# Patient Record
Sex: FEMALE | Race: WHITE | URBAN - METROPOLITAN AREA
[De-identification: names, ages, dates, MRNs, and addresses within clinical notes are randomized per-mention and may not be internally consistent; named-entity substitution may affect disease eponyms.]

---

## 2018-11-09 ENCOUNTER — TRANSCRIBE ORDERS (OUTPATIENT)
Dept: ADMINISTRATIVE | Facility: HOSPITAL | Age: 43
End: 2018-11-09

## 2023-07-04 ENCOUNTER — APPOINTMENT (EMERGENCY)
Dept: RADIOLOGY | Facility: HOSPITAL | Age: 48
DRG: 247 | End: 2023-07-04
Payer: COMMERCIAL

## 2023-07-04 ENCOUNTER — HOSPITAL ENCOUNTER (INPATIENT)
Facility: HOSPITAL | Age: 48
LOS: 2 days | Discharge: HOME | DRG: 247 | End: 2023-07-06
Attending: EMERGENCY MEDICINE | Admitting: INTERNAL MEDICINE
Payer: COMMERCIAL

## 2023-07-04 DIAGNOSIS — I21.4 NSTEMI (NON-ST ELEVATED MYOCARDIAL INFARCTION) (HCC): Primary | ICD-10-CM

## 2023-07-04 DIAGNOSIS — M54.2 NECK PAIN: ICD-10-CM

## 2023-07-04 DIAGNOSIS — R51.9 HEADACHE: ICD-10-CM

## 2023-07-04 PROBLEM — F41.9 ANXIETY: Status: ACTIVE | Noted: 2023-07-04

## 2023-07-04 PROBLEM — E66.01 MORBID OBESITY (HCC): Status: ACTIVE | Noted: 2023-07-04

## 2023-07-04 PROBLEM — M79.7 FIBROMYALGIA: Status: ACTIVE | Noted: 2023-07-04
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
## 2023-07-04 PROBLEM — E10.9 INSULIN DEPENDENT TYPE 1 DIABETES MELLITUS (HCC): Status: ACTIVE | Noted: 2023-07-04

## 2023-07-04 PROBLEM — I10 ESSENTIAL HYPERTENSION: Status: ACTIVE | Noted: 2023-07-04

## 2023-07-04 PROBLEM — E78.5 HYPERLIPIDEMIA: Status: ACTIVE | Noted: 2023-07-04

## 2023-07-04 LAB
2HR DELTA HS TROPONIN: -550 NG/L
4HR DELTA HS TROPONIN: -1288 NG/L
ALBUMIN SERPL BCP-MCNC: 3.4 G/DL (ref 3.5–5)
ALP SERPL-CCNC: 94 U/L (ref 46–116)
ALT SERPL W P-5'-P-CCNC: 31 U/L (ref 12–78)
ANION GAP SERPL CALCULATED.3IONS-SCNC: 2 MMOL/L
APTT PPP: 24 SECONDS (ref 23–37)
APTT PPP: 32 SECONDS (ref 23–37)
AST SERPL W P-5'-P-CCNC: 34 U/L (ref 5–45)
BASOPHILS # BLD AUTO: 0.05 THOUSANDS/ÂΜL (ref 0–0.1)
BASOPHILS NFR BLD AUTO: 1 % (ref 0–1)
BILIRUB SERPL-MCNC: 0.44 MG/DL (ref 0.2–1)
BUN SERPL-MCNC: 9 MG/DL (ref 5–25)
CALCIUM ALBUM COR SERPL-MCNC: 9.4 MG/DL (ref 8.3–10.1)
CALCIUM SERPL-MCNC: 8.9 MG/DL (ref 8.3–10.1)
CARDIAC TROPONIN I PNL SERPL HS: 1955 NG/L (ref 8–18)
CARDIAC TROPONIN I PNL SERPL HS: 2812 NG/L
CARDIAC TROPONIN I PNL SERPL HS: 3550 NG/L
CARDIAC TROPONIN I PNL SERPL HS: 4100 NG/L
CHLORIDE SERPL-SCNC: 104 MMOL/L (ref 96–108)
CO2 SERPL-SCNC: 28 MMOL/L (ref 21–32)
CREAT SERPL-MCNC: 0.97 MG/DL (ref 0.6–1.3)
EOSINOPHIL # BLD AUTO: 0.22 THOUSAND/ÂΜL (ref 0–0.61)
EOSINOPHIL NFR BLD AUTO: 2 % (ref 0–6)
ERYTHROCYTE [DISTWIDTH] IN BLOOD BY AUTOMATED COUNT: 12.1 % (ref 11.6–15.1)
FLUAV RNA RESP QL NAA+PROBE: NEGATIVE
FLUBV RNA RESP QL NAA+PROBE: NEGATIVE
GFR SERPL CREATININE-BSD FRML MDRD: 70 ML/MIN/1.73SQ M
GLUCOSE SERPL-MCNC: 241 MG/DL (ref 65–140)
HCG SERPL QL: NEGATIVE
HCT VFR BLD AUTO: 43 % (ref 34.8–46.1)
HGB BLD-MCNC: 14.4 G/DL (ref 11.5–15.4)
IMM GRANULOCYTES # BLD AUTO: 0.04 THOUSAND/UL (ref 0–0.2)
IMM GRANULOCYTES NFR BLD AUTO: 0 % (ref 0–2)
INR PPP: 1.11 (ref 0.84–1.19)
LYMPHOCYTES # BLD AUTO: 2.66 THOUSANDS/ÂΜL (ref 0.6–4.47)
LYMPHOCYTES NFR BLD AUTO: 27 % (ref 14–44)
MCH RBC QN AUTO: 29.3 PG (ref 26.8–34.3)
MCHC RBC AUTO-ENTMCNC: 33.5 G/DL (ref 31.4–37.4)
MCV RBC AUTO: 88 FL (ref 82–98)
MONOCYTES # BLD AUTO: 1.06 THOUSAND/ÂΜL (ref 0.17–1.22)
MONOCYTES NFR BLD AUTO: 11 % (ref 4–12)
NEUTROPHILS # BLD AUTO: 5.8 THOUSANDS/ÂΜL (ref 1.85–7.62)
NEUTS SEG NFR BLD AUTO: 59 % (ref 43–75)
NRBC BLD AUTO-RTO: 0 /100 WBCS
PLATELET # BLD AUTO: 233 THOUSANDS/UL (ref 149–390)
PMV BLD AUTO: 10.3 FL (ref 8.9–12.7)
POTASSIUM SERPL-SCNC: 4.2 MMOL/L (ref 3.5–5.3)
PROT SERPL-MCNC: 6.7 G/DL (ref 6.4–8.4)
PROTHROMBIN TIME: 14.5 SECONDS (ref 11.6–14.5)
RBC # BLD AUTO: 4.91 MILLION/UL (ref 3.81–5.12)
RSV RNA RESP QL NAA+PROBE: NEGATIVE
SARS-COV-2 RNA RESP QL NAA+PROBE: NEGATIVE
SODIUM SERPL-SCNC: 134 MMOL/L (ref 135–147)
TSH SERPL DL<=0.05 MIU/L-ACNC: 0.99 UIU/ML (ref 0.45–4.5)
WBC # BLD AUTO: 9.83 THOUSAND/UL (ref 4.31–10.16)

## 2023-07-04 PROCEDURE — 84703 CHORIONIC GONADOTROPIN ASSAY: CPT

## 2023-07-04 PROCEDURE — 99223 1ST HOSP IP/OBS HIGH 75: CPT | Performed by: INTERNAL MEDICINE

## 2023-07-04 PROCEDURE — 93005 ELECTROCARDIOGRAM TRACING: CPT

## 2023-07-04 PROCEDURE — 86618 LYME DISEASE ANTIBODY: CPT

## 2023-07-04 PROCEDURE — 85610 PROTHROMBIN TIME: CPT

## 2023-07-04 PROCEDURE — 36415 COLL VENOUS BLD VENIPUNCTURE: CPT

## 2023-07-04 PROCEDURE — 84484 ASSAY OF TROPONIN QUANT: CPT | Performed by: INTERNAL MEDICINE

## 2023-07-04 PROCEDURE — 99284 EMERGENCY DEPT VISIT MOD MDM: CPT

## 2023-07-04 PROCEDURE — 84484 ASSAY OF TROPONIN QUANT: CPT

## 2023-07-04 PROCEDURE — 71275 CT ANGIOGRAPHY CHEST: CPT

## 2023-07-04 PROCEDURE — 96375 TX/PRO/DX INJ NEW DRUG ADDON: CPT

## 2023-07-04 PROCEDURE — 99285 EMERGENCY DEPT VISIT HI MDM: CPT | Performed by: EMERGENCY MEDICINE

## 2023-07-04 PROCEDURE — 74174 CTA ABD&PLVS W/CONTRAST: CPT

## 2023-07-04 PROCEDURE — 85730 THROMBOPLASTIN TIME PARTIAL: CPT

## 2023-07-04 PROCEDURE — 85730 THROMBOPLASTIN TIME PARTIAL: CPT | Performed by: INTERNAL MEDICINE

## 2023-07-04 PROCEDURE — 96365 THER/PROPH/DIAG IV INF INIT: CPT

## 2023-07-04 PROCEDURE — 96366 THER/PROPH/DIAG IV INF ADDON: CPT

## 2023-07-04 PROCEDURE — 80053 COMPREHEN METABOLIC PANEL: CPT

## 2023-07-04 PROCEDURE — 70498 CT ANGIOGRAPHY NECK: CPT

## 2023-07-04 PROCEDURE — 0241U HB NFCT DS VIR RESP RNA 4 TRGT: CPT

## 2023-07-04 PROCEDURE — 84443 ASSAY THYROID STIM HORMONE: CPT

## 2023-07-04 PROCEDURE — 87476 LYME DIS DNA AMP PROBE: CPT | Performed by: INTERNAL MEDICINE

## 2023-07-04 PROCEDURE — 85025 COMPLETE CBC W/AUTO DIFF WBC: CPT

## 2023-07-04 PROCEDURE — 70496 CT ANGIOGRAPHY HEAD: CPT

## 2023-07-04 RX ORDER — ONDANSETRON 2 MG/ML
4 INJECTION INTRAMUSCULAR; INTRAVENOUS EVERY 4 HOURS PRN
Status: DISCONTINUED | OUTPATIENT
Start: 2023-07-04 | End: 2023-07-06 | Stop reason: HOSPADM

## 2023-07-04 RX ORDER — PANTOPRAZOLE SODIUM 40 MG/1
40 TABLET, DELAYED RELEASE ORAL
Status: DISCONTINUED | OUTPATIENT
Start: 2023-07-05 | End: 2023-07-06 | Stop reason: HOSPADM

## 2023-07-04 RX ORDER — HEPARIN SODIUM 10000 [USP'U]/100ML
3-20 INJECTION, SOLUTION INTRAVENOUS
Status: DISCONTINUED | OUTPATIENT
Start: 2023-07-04 | End: 2023-07-05

## 2023-07-04 RX ORDER — HYDROCODONE BITARTRATE AND ACETAMINOPHEN 5; 325 MG/1; MG/1
1 TABLET ORAL EVERY 6 HOURS PRN
COMMUNITY

## 2023-07-04 RX ORDER — GUAIFENESIN/DEXTROMETHORPHAN 100-10MG/5
10 SYRUP ORAL EVERY 4 HOURS PRN
Status: DISCONTINUED | OUTPATIENT
Start: 2023-07-04 | End: 2023-07-06 | Stop reason: HOSPADM

## 2023-07-04 RX ORDER — OMEPRAZOLE 20 MG/1
20 CAPSULE, DELAYED RELEASE ORAL DAILY
COMMUNITY

## 2023-07-04 RX ORDER — HEPARIN SODIUM 1000 [USP'U]/ML
2000 INJECTION, SOLUTION INTRAVENOUS; SUBCUTANEOUS EVERY 6 HOURS PRN
Status: DISCONTINUED | OUTPATIENT
Start: 2023-07-04 | End: 2023-07-05

## 2023-07-04 RX ORDER — DULOXETIN HYDROCHLORIDE 30 MG/1
30 CAPSULE, DELAYED RELEASE ORAL DAILY
COMMUNITY

## 2023-07-04 RX ORDER — ALPRAZOLAM 1 MG/1
1 TABLET ORAL
COMMUNITY

## 2023-07-04 RX ORDER — MELATONIN
2000 DAILY
COMMUNITY

## 2023-07-04 RX ORDER — ALPRAZOLAM 0.5 MG/1
1 TABLET ORAL DAILY PRN
Status: DISCONTINUED | OUTPATIENT
Start: 2023-07-04 | End: 2023-07-06 | Stop reason: HOSPADM

## 2023-07-04 RX ORDER — DULOXETIN HYDROCHLORIDE 30 MG/1
30 CAPSULE, DELAYED RELEASE ORAL DAILY
Status: DISCONTINUED | OUTPATIENT
Start: 2023-07-05 | End: 2023-07-06 | Stop reason: HOSPADM

## 2023-07-04 RX ORDER — METOCLOPRAMIDE HYDROCHLORIDE 5 MG/ML
10 INJECTION INTRAMUSCULAR; INTRAVENOUS ONCE
Status: COMPLETED | OUTPATIENT
Start: 2023-07-04 | End: 2023-07-04

## 2023-07-04 RX ORDER — HYDROCHLOROTHIAZIDE 12.5 MG/1
12.5 TABLET ORAL DAILY
Status: DISCONTINUED | OUTPATIENT
Start: 2023-07-05 | End: 2023-07-06 | Stop reason: HOSPADM

## 2023-07-04 RX ORDER — LISINOPRIL 10 MG/1
10 TABLET ORAL DAILY
Status: DISCONTINUED | OUTPATIENT
Start: 2023-07-05 | End: 2023-07-06 | Stop reason: HOSPADM

## 2023-07-04 RX ORDER — HYDROXYZINE HYDROCHLORIDE 25 MG/1
25 TABLET, FILM COATED ORAL EVERY 6 HOURS PRN
COMMUNITY

## 2023-07-04 RX ORDER — ATORVASTATIN CALCIUM 40 MG/1
40 TABLET, FILM COATED ORAL
Status: DISCONTINUED | OUTPATIENT
Start: 2023-07-04 | End: 2023-07-06 | Stop reason: HOSPADM

## 2023-07-04 RX ORDER — ACETAMINOPHEN 325 MG/1
975 TABLET ORAL ONCE
Status: COMPLETED | OUTPATIENT
Start: 2023-07-04 | End: 2023-07-04

## 2023-07-04 RX ORDER — SODIUM CHLORIDE, SODIUM GLUCONATE, SODIUM ACETATE, POTASSIUM CHLORIDE, MAGNESIUM CHLORIDE, SODIUM PHOSPHATE, DIBASIC, AND POTASSIUM PHOSPHATE .53; .5; .37; .037; .03; .012; .00082 G/100ML; G/100ML; G/100ML; G/100ML; G/100ML; G/100ML; G/100ML
1000 INJECTION, SOLUTION INTRAVENOUS ONCE
Status: COMPLETED | OUTPATIENT
Start: 2023-07-04 | End: 2023-07-04

## 2023-07-04 RX ORDER — ATORVASTATIN CALCIUM 40 MG/1
40 TABLET, FILM COATED ORAL DAILY
COMMUNITY
End: 2023-07-12 | Stop reason: SDUPTHER

## 2023-07-04 RX ORDER — IODIXANOL 320 MG/ML
120 INJECTION, SOLUTION INTRAVASCULAR
Status: COMPLETED | OUTPATIENT
Start: 2023-07-04 | End: 2023-07-04

## 2023-07-04 RX ORDER — HEPARIN SODIUM 1000 [USP'U]/ML
4000 INJECTION, SOLUTION INTRAVENOUS; SUBCUTANEOUS EVERY 6 HOURS PRN
Status: DISCONTINUED | OUTPATIENT
Start: 2023-07-04 | End: 2023-07-05

## 2023-07-04 RX ORDER — DIPHENHYDRAMINE HYDROCHLORIDE 50 MG/ML
25 INJECTION INTRAMUSCULAR; INTRAVENOUS ONCE
Status: COMPLETED | OUTPATIENT
Start: 2023-07-04 | End: 2023-07-04

## 2023-07-04 RX ORDER — HEPARIN SODIUM 1000 [USP'U]/ML
4000 INJECTION, SOLUTION INTRAVENOUS; SUBCUTANEOUS ONCE
Status: COMPLETED | OUTPATIENT
Start: 2023-07-04 | End: 2023-07-04

## 2023-07-04 RX ORDER — NITROGLYCERIN 0.4 MG/1
0.4 TABLET SUBLINGUAL
Status: DISCONTINUED | OUTPATIENT
Start: 2023-07-04 | End: 2023-07-06 | Stop reason: HOSPADM

## 2023-07-04 RX ADMIN — ACETAMINOPHEN 975 MG: 325 TABLET, FILM COATED ORAL at 11:11

## 2023-07-04 RX ADMIN — SODIUM CHLORIDE, SODIUM GLUCONATE, SODIUM ACETATE, POTASSIUM CHLORIDE, MAGNESIUM CHLORIDE, SODIUM PHOSPHATE, DIBASIC, AND POTASSIUM PHOSPHATE 1000 ML: .53; .5; .37; .037; .03; .012; .00082 INJECTION, SOLUTION INTRAVENOUS at 11:13

## 2023-07-04 RX ADMIN — IODIXANOL 120 ML: 320 INJECTION, SOLUTION INTRAVASCULAR at 12:21

## 2023-07-04 RX ADMIN — NITROGLYCERIN 0.4 MG: 0.4 TABLET SUBLINGUAL at 19:31

## 2023-07-04 RX ADMIN — DIPHENHYDRAMINE HYDROCHLORIDE 25 MG: 50 INJECTION, SOLUTION INTRAMUSCULAR; INTRAVENOUS at 11:13

## 2023-07-04 RX ADMIN — HEPARIN SODIUM 4000 UNITS: 1000 INJECTION INTRAVENOUS; SUBCUTANEOUS at 22:54

## 2023-07-04 RX ADMIN — HEPARIN SODIUM 11.1 UNITS/KG/HR: 10000 INJECTION, SOLUTION INTRAVENOUS at 13:41

## 2023-07-04 RX ADMIN — ASPIRIN 324 MG: 81 TABLET, COATED ORAL at 16:35

## 2023-07-04 RX ADMIN — ATORVASTATIN CALCIUM 40 MG: 40 TABLET, FILM COATED ORAL at 16:36

## 2023-07-04 RX ADMIN — NITROGLYCERIN 0.4 MG: 0.4 TABLET SUBLINGUAL at 18:12

## 2023-07-04 RX ADMIN — METOCLOPRAMIDE HYDROCHLORIDE 10 MG: 5 INJECTION INTRAMUSCULAR; INTRAVENOUS at 11:13

## 2023-07-04 RX ADMIN — HEPARIN SODIUM 4000 UNITS: 1000 INJECTION INTRAVENOUS; SUBCUTANEOUS at 13:39

## 2023-07-04 RX ADMIN — ALPRAZOLAM 1 MG: 0.5 TABLET ORAL at 18:04

## 2023-07-04 NOTE — ED NOTES
Pt reports improving of sx after nitro, throat is hurting less and arm pain is subsiding.       Shashi Wagoner RN  07/04/23 0498

## 2023-07-04 NOTE — ED NOTES
Pt unable to verify home medication doses with RN and RN unable to verify with home pharmacy d/t holiday hours.  Med rec completed based on screen shot provided by admitting physician via [employer] at his request.      Chandler Bryson RN  07/04/23 7946

## 2023-07-04 NOTE — H&P
[address] [facility]  H&P  Name: Tanvi Lo 57 y.o. female I MRN: [id number]  Unit/Bed#: ED 23 I Date of Admission: 7/4/2023   Date of Service: 7/4/2023 I Hospital Day: 0      Assessment & Plan:    * NSTEMI (non-ST elevated myocardial infarction)  Assessment & Plan  Presents with a constellation of progressive symptoms including chest discomfort with radiation to the right shoulder arm and jaw along with further radiation intermittently into the back of the neck/head - was sent in to the ED to rule out possible "meningitis" -> over the last few days, has also noted occasional episodes of diaphoresis, sore throat, and dizziness with above episodes -> peak of headache intensity was approximately 3-4 days ago with transient improvement  Denies fever/chills or persistent nuchal rigidity - WBC count normal with an unremarkable cell differential -> less likely suspicion of meningitis clinically at this time  Consider atypical presentation of an acute coronary event in a female ? ??   Further work-up in the ED revealed evidence of a significantly elevated high sensitivity troponin of 4100 with mild subsequent downtrending  Initiated on a heparin drip   CT angiogram of head/neck negative for acute intracranial etiology or intravascular issues - CT angiogram of chest/abdomen/pelvis negative for aortic dissection  Await echocardiogram to assess for structural heart disease and/or evaluation for stress cardiomyopathy -> await cardiology evaluation/input -> kept NPO past midnight for possibility of cardiac catheterization  Initiate ASA regimen - continue high intensity statin -> check updated A1c and fasting lipid panel   PRN SL NTG on board  Telemetry monitoring  Remains hemodynamically stable    Insulin dependent type 1 diabetes mellitus  Assessment & Plan  Lab Results   Component Value Date    HGBA1C 8.4 (H) 10/04/2021     Has been maintained on an insulin pump for nearly 30 years per patient recollection -> currently refusing to transition to a basal/prandial insulin with sliding scale coverage  Counseled patient that certain procedures in the hospital may require transitioning off a pump for a period of time -> patient expresses understanding  Carbohydrate restricted diet  Hypoglycemia protocol    Essential hypertension  Assessment & Plan  Low-sodium diet  Continue HCTZ/Zestril    Fibromyalgia  Assessment & Plan  Continue Cymbalta    Tick bites/exposure  Assessment & Plan  Patient reported to the ED staff of spending a lot of time outdoors last several days and removing several ticks off her body  Lyme disease work-up underway    Morbid obesity  Assessment & Plan  Lifestyle/diet modifications    Hyperlipidemia  Assessment & Plan  Continue Lipitor  Await updated lipid panel in the setting of primary issue    Anxiety  Assessment & Plan  C/w home PRN Xanax regimen      DVT Prophylaxis:  Heparin drip    Code Status:  Full code    Discussion with:  Patient, along with , at bedside today    Anticipated Length of Stay:  Patient will be admitted on an Inpatient basis with an anticipated length of stay of greater than 2 midnights. Justification for Hospital Stay: NSTEMI on heparin drip requiring telemetry monitoring, and cardiology evaluation with possible intervention. Total Time for Visit, including Counseling / Coordination of Care: 75 minutes. More than 50% of total time spent on counseling and coordination of care, on one or more of the following: performing physical exam; counseling and coordination of care; obtaining or reviewing history; documenting in the medical record; reviewing/ordering tests, medications or procedures; communicating with other healthcare professionals and discussing with patient's family/caregivers.       Chief Complaint:  Chest pain with neck/jaw/posterior head stiffness/discomfort      History of Present Illness:    Iliana Tompkins is a 57 y.o. female who presents with a constellation of various symptoms including chest pain coupled with right-sided jaw/shoulder and posterior head stiffness with waxing/waning episodes over the last 3 to 4 days. Notes that the highest intensity ever posterior headache was this past Friday/Saturday, with transient improvement in frequency. She also notes intermittent episodes of a "sore throat", along with some sweating/dizziness and mild nausea without vomiting, with the symptoms. Denies any prior history of heart disease, personally and in family. Of note, she is a type 1 diabetic, who has chronically been on an insulin pump for nearly 30 years, per her recollection. At the time my encounter, ED work-up completed thus far, reveals evidence of significantly elevated troponins, for which she has been initiated on a heparin drip. At the time my encounter, she is resting fairly comfortably in bed but does acknowledge some weakness and fatigue. Overall, she remains in pleasant and hopeful spirits. Review of Systems:    Review of Systems - A thorough 12 point review systems was conducted. Pertinent positives and negatives are mentioned in the history of present illness. Past Medical and Surgical History:     Past Medical History:   Diagnosis Date   • Diabetes mellitus ([address])    • Graves disease    • High cholesterol    • Hypertension        No past surgical history on file. Medications & Allergies:    Prior to Admission medications    Medication Sig Start Date End Date Taking?  Authorizing Provider   ALPRAZolam Katie Macias) 1 mg tablet Take 1 mg by mouth daily at bedtime as needed for anxiety Pt unable to verify dose for RN   Yes Historical Provider, MD   atorvastatin (LIPITOR) 40 mg tablet Take 40 mg by mouth daily Pt unable to verify dose for RN   Yes Historical Provider, MD   cholecalciferol (VITAMIN D3) 1,000 units tablet Take 2,000 Units by mouth daily Pt unable to verify dose for RN   Yes Historical Provider, MD DULoxetine (CYMBALTA) 30 mg delayed release capsule Take 30 mg by mouth daily Pt unable to verify dose for RN   Yes Historical Provider, MD   HYDROcodone-acetaminophen (NORCO) 5-325 mg per tablet Take 1 tablet by mouth every 6 (six) hours as needed for pain Pt unable to verify dose for RN   Yes Historical Provider, MD   hydrOXYzine HCL (ATARAX) 25 mg tablet Take 25 mg by mouth every 6 (six) hours as needed for itching Pt unable to verify dose for RN   Yes Historical Provider, MD   omeprazole (PriLOSEC) 20 mg delayed release capsule Take 20 mg by mouth daily Pt unable to verify dose for RN   Yes Historical Provider, MD         Allergies:    Allergies   Allergen Reactions   • Percocet [Oxycodone-Acetaminophen] Vomiting   • Victoza [Liraglutide] GI Intolerance         Social History:    Substance Use History:   Social History     Substance and Sexual Activity   Alcohol Use Not on file     Social History     Tobacco Use   Smoking Status Not on file   Smokeless Tobacco Not on file     Social History     Substance and Sexual Activity   Drug Use Not on file         Family History:    Noncontributory      Physical Exam:     Vitals:   Blood Pressure: 125/73 (07/04/23 1500)  Pulse: 82 (07/04/23 1500)  Temperature: (!) 97 °F (36.1 °C) (07/04/23 1028)  Respirations: 16 (07/04/23 1500)  Weight - Scale: 113 kg (249 lb 1.9 oz) (07/04/23 1316)  SpO2: 98 % (07/04/23 1500)      GENERAL  obese - mildly weak/fatigued   HEAD   Normocephalic - atraumatic   EYES   PERRL - EOMI    MOUTH   Mucosa moist   CARDIAC  rate controlled   PULMONARY  nonlabored respirations at rest   ABDOMEN   Soft - nontender/nondistended   MUSCULOSKELETAL   Motor strength/range of motion intact   NEUROLOGIC   Alert/oriented at baseline   SKIN   Chronic wrinkles/blemishes    PSYCHIATRIC   Mood/affect stable         Additional Data:     Labs & Recent Cultures:    Results from last 7 days   Lab Units 07/04/23  1111   WBC Thousand/uL 9.83   HEMOGLOBIN g/dL 14.4 HEMATOCRIT % 43.0   PLATELETS Thousands/uL 233   NEUTROS PCT % 59   LYMPHS PCT % 27   MONOS PCT % 11   EOS PCT % 2     Results from last 7 days   Lab Units 07/04/23  1111   SODIUM mmol/L 134*   POTASSIUM mmol/L 4.2   CHLORIDE mmol/L 104   CO2 mmol/L 28   BUN mg/dL 9   CREATININE mg/dL 0.97   ANION GAP mmol/L 2   CALCIUM mg/dL 8.9   ALBUMIN g/dL 3.4*   TOTAL BILIRUBIN mg/dL 0.44   ALK PHOS U/L 94   ALT U/L 31   AST U/L 34   GLUCOSE RANDOM mg/dL 241*     Results from last 7 days   Lab Units 07/04/23  1330   INR  1.11                             Lines/Drains:  Invasive Devices     Peripheral Intravenous Line  Duration           Peripheral IV 07/04/23 Left Forearm <1 day    Peripheral IV 07/04/23 Right Antecubital <1 day                  Imaging:     CTA head and neck with and without contrast    Result Date: 7/4/2023  Narrative: CTA NECK AND BRAIN WITH AND WITHOUT CONTRAST INDICATION: r/o headache with read flag symptoms COMPARISON:   None. TECHNIQUE:  Routine CT imaging of the Brain without contrast.  Post contrast imaging was performed after administration of iodinated contrast through the neck and brain. Post contrast axial 0.625 mm images timed to opacify the arterial system. 3D rendering was performed on an independent workstation. MIP reconstructions performed. Coronal reconstructions were performed of the noncontrast portion of the brain. Radiation dose length product (DLP) for this visit:  1585.65 mGy-cm . This examination, like all CT scans performed in the [facility], was performed utilizing techniques to minimize radiation dose exposure, including the use of iterative reconstruction and automated exposure control. IV Contrast:  120 mL of iodixanol (VISIPAQUE) IMAGE QUALITY:   Diagnostic FINDINGS: NONCONTRAST BRAIN PARENCHYMA:  No intracranial mass, mass effect or midline shift. No CT signs of acute infarction. No acute parenchymal hemorrhage.  Hypodensity in the inferior left basal ganglia favored to represent prominent perivascular space. VENTRICLES AND EXTRA-AXIAL SPACES:  Normal for the patient's age. VISUALIZED ORBITS: Normal visualized orbits. PARANASAL SINUSES: Normal visualized paranasal sinuses. CERVICAL VASCULATURE AORTIC ARCH AND GREAT VESSELS:  Normal aortic arch and great vessel origins. Normal visualized subclavian vessels. RIGHT VERTEBRAL ARTERY CERVICAL SEGMENT:  Normal origin. The vessel is normal in caliber throughout the neck. LEFT VERTEBRAL ARTERY CERVICAL SEGMENT:  Normal origin. The vessel is normal in caliber throughout the neck. RIGHT EXTRACRANIAL CAROTID SEGMENT: Calcified plaque in the proximal internal carotid artery with less than 50% stenosis. No dissection. LEFT EXTRACRANIAL CAROTID SEGMENT: Calcified plaque in the proximal internal carotid artery with less than 50% stenosis. No dissection. NASCET criteria was used to determine the degree of internal carotid artery diameter stenosis. INTRACRANIAL VASCULATURE INTERNAL CAROTID ARTERIES: Mild calcified plaque without significant stenosis. ANTERIOR CIRCULATION:  Symmetric A1 segments and anterior cerebral arteries with normal enhancement. Normal anterior communicating artery. MIDDLE CEREBRAL ARTERY CIRCULATION:  M1 segment and middle cerebral artery branches demonstrate normal enhancement bilaterally. DISTAL VERTEBRAL ARTERIES:  Normal distal vertebral arteries. Posterior inferior cerebellar artery origins are normal. Normal vertebral basilar junction. BASILAR ARTERY:  Basilar artery is normal in caliber. Normal superior cerebellar arteries. POSTERIOR CEREBRAL ARTERIES: Both posterior cerebral arteries arise from the internal carotid arteries consistent with a fetal origin. No focal stenosis is identified. Normal posterior communicating arteries. VENOUS STRUCTURES:  Normal. NON VASCULAR ANATOMY BONY STRUCTURES:  No acute osseous abnormality. SOFT TISSUES OF THE NECK:  Unremarkable.  THORACIC INLET:  Normal. Impression: No acute intracranial pathology. No significant stenosis or dissection of the cervical carotid or vertebral arteries. No significant intracranial stenosis, large vessel occlusion or aneurysm. Workstation performed: [id number]       CTA dissection protocol chest/abdomen/pelvis    Result Date: 7/4/2023  Narrative: CTA - CHEST, ABDOMEN AND PELVIS - WITHOUT AND WITH IV CONTRAST INDICATION:   r/o dissection. COMPARISON:  None. TECHNIQUE: CT examination of the chest, abdomen and pelvis was performed both prior to and after the administration of intravenous contrast.   The noncontrast portion of this examination was performed utilizing low radiation dose technique. Thin section angiographic arterial phase post contrast technique was used in order to evaluate for aortic dissection. 3D reformatted images and volume rendering were performed on an independent workstation. Multiplanar 2D reformatted images were created from the source data. Radiation dose length product (DLP) for this visit:  1643.71 mGy-cm . This examination, like all CT scans performed in the [facility], was performed utilizing techniques to minimize radiation dose exposure, including the use of iterative reconstruction and automated exposure control. IV Contrast:  120 mL of iodixanol (VISIPAQUE) Enteric Contrast:  Enteric contrast was not administered. FINDINGS: AORTA:  There is no aortic dissection or intramural hematoma. There is no aortic aneurysm. No significant atherosclerotic disease. Specifically, no flow limiting atherosclerotic stenosis of aorta or major aortic branch vessel in the chest, abdomen or pelvis. CHEST LUNGS:  Lungs are clear. There is no tracheal or endobronchial lesion. PLEURA:  Unremarkable. HEART/PULMONARY ARTERIAL TREE:  Unremarkable for patient's age. MEDIASTINUM AND MEET:  Unremarkable. CHEST WALL AND LOWER NECK:  Unremarkable. ABDOMEN LIVER/BILIARY TREE:  Unremarkable.  GALLBLADDER: Gallbladder is surgically absent. SPLEEN:  Unremarkable. PANCREAS:  Unremarkable. ADRENAL GLANDS:  Unremarkable. KIDNEYS/URETERS:  Unremarkable. No hydronephrosis. STOMACH AND BOWEL:  Unremarkable. APPENDIX:  A normal appendix was visualized. ABDOMINOPELVIC CAVITY:  No ascites or free intraperitoneal air. No lymphadenopathy. PELVIS REPRODUCTIVE ORGANS: Simple benign-appearing 2.9 cm left ovarian cyst is noted likely functional. Patient is status post hysterectomy. URINARY BLADDER:  Unremarkable. ABDOMINAL WALL/INGUINAL REGIONS:  Unremarkable. OSSEOUS STRUCTURES:  No acute fracture or destructive osseous lesion. Impression: No evidence of aortic dissection. Workstation performed: [id number]                   ** Please Note: This note is constructed using a voice recognition dictation system. An occasional wrong word/phrase or “sound-a-like” substitution may have been picked up by dictation device due to the inherent limitations of voice recognition software. Read the chart carefully and recognize, using reasonable context, where substitutions may have occurred. **

## 2023-07-04 NOTE — Clinical Note
The left coronary artery was injected and visualized. Multiple views of the injected vessel were taken.

## 2023-07-04 NOTE — ED NOTES
Pt able to appropriately complete insulin pump information form, filed in chart. MD aware.      Alee Radford, RN  07/04/23 0610

## 2023-07-04 NOTE — ED NOTES
Pt c/o pain is now radiating down right arm and feeling chest pressure.      Jeane Jain, RN  07/04/23 1223

## 2023-07-04 NOTE — ASSESSMENT & PLAN NOTE
Lab Results   Component Value Date    HGBA1C 8.4 (H) 10/04/2021     Has been maintained on an insulin pump for nearly 30 years per patient recollection -> currently refusing to transition to a basal/prandial insulin with sliding scale coverage  Counseled patient that certain procedures in the hospital may require transitioning off a pump for a period of time -> patient expresses understanding  Carbohydrate restricted diet  Hypoglycemia protocol

## 2023-07-04 NOTE — Clinical Note
Prepped: right groin and right radial. Prepped with: ChloraPrep. The site was clipped. The patient was draped.

## 2023-07-04 NOTE — ASSESSMENT & PLAN NOTE
Presents with a constellation of progressive symptoms including chest discomfort with radiation to the right shoulder arm and jaw along with further radiation intermittently into the back of the neck/head - was sent in to the ED to rule out possible "meningitis" -> over the last few days, has also noted occasional episodes of diaphoresis, sore throat, and dizziness with above episodes -> peak of headache intensity was approximately 3-4 days ago with transient improvement  Denies fever/chills or persistent nuchal rigidity - WBC count normal with an unremarkable cell differential -> less likely suspicion of meningitis clinically at this time  Consider atypical presentation of an acute coronary event in a female ? ??   Further work-up in the ED revealed evidence of a significantly elevated high sensitivity troponin of 4100 with mild subsequent downtrending  Initiated on a heparin drip   CT angiogram of head/neck negative for acute intracranial etiology or intravascular issues - CT angiogram of chest/abdomen/pelvis negative for aortic dissection  Await echocardiogram to assess for structural heart disease and/or evaluation for stress cardiomyopathy -> await cardiology evaluation/input -> kept NPO past midnight for possibility of cardiac catheterization  Initiate ASA regimen - continue high intensity statin -> check updated A1c and fasting lipid panel   PRN SL NTG on board  Telemetry monitoring  Remains hemodynamically stable

## 2023-07-04 NOTE — ED NOTES
Pt reporting throat pain and worsening headache, ECG taken and xanax given.      Terrell Lynn RN  07/04/23 2749

## 2023-07-04 NOTE — LETTER
[address] [address]  Dept: [phone]    July 6, 2023     Patient: Corinna Gordon   YOB: 1975   Date of Visit: 7/4/2023       To Whom it May Concern:    Corinna Gordon is under my professional care. She was seen in the hospital from 7/4/2023 to 07/06/23. She may return to work on Monday, July 10th, 2023 without limitations. If you have any questions or concerns, please don't hesitate to call.          Sincerely,          Jordyn Banerjee PA-C room air

## 2023-07-04 NOTE — ED PROVIDER NOTES
History  Chief Complaint   Patient presents with   • Neck Pain     Pt reports she was sent in to "rule out meningitis". Pt reports since Friday she has had stiff neck, jaw pain, burning throat,headache, and light sensitivity. Patient is a 53-year-old female presenting to the emergency department for evaluation of "rule out meningitis."  Patient does have a history of diabetes with insulin pump, Graves' disease, hypertension. Patient notes that Friday she developed the worst headache she is ever experienced with associated full body aches and pains. Patient was seen at [facility] where she had a cardiac work-up initiated because she was also experiencing chest pain. Patient notes that Friday she woke up with horrible neck pain. She states she is never had this before it felt different than muscle aches. She states it was associated with light sensitivity and sound sensitivity. She denies any vision change, tinnitus. However she notes numbness and tingling down her arms. She endorses chest pressure associated with without diaphoresis or shortness of breath. Patient also notes a burning sore throat which strep test was negative. Patient is outside a lot and has taken multiple ticks off of her. None       Past Medical History:   Diagnosis Date   • Diabetes mellitus ([address])    • Graves disease    • High cholesterol    • Hypertension        No past surgical history on file. No family history on file. I have reviewed and agree with the history as documented. E-Cigarette/Vaping     E-Cigarette/Vaping Substances           Review of Systems   Constitutional: Positive for activity change, chills and fatigue. Negative for fever. HENT: Positive for sore throat. Negative for congestion, ear pain, trouble swallowing and voice change. Eyes: Positive for photophobia and visual disturbance. Negative for pain. Respiratory: Negative for cough, chest tightness and shortness of breath. Cardiovascular: Negative for chest pain and palpitations. Gastrointestinal: Positive for nausea. Negative for abdominal pain, constipation, diarrhea and vomiting. Genitourinary: Negative for decreased urine volume, dysuria and hematuria. Musculoskeletal: Negative for arthralgias and back pain. Skin: Negative for color change and rash. Neurological: Positive for weakness, numbness and headaches. Negative for dizziness, seizures and syncope. Psychiatric/Behavioral: Negative for agitation and behavioral problems. All other systems reviewed and are negative. Physical Exam  ED Triage Vitals   Temperature Pulse Respirations Blood Pressure SpO2   07/04/23 1028 07/04/23 1028 07/04/23 1028 07/04/23 1028 07/04/23 1028   (!) 97 °F (36.1 °C) 89 16 138/71 96 %      Temp src Heart Rate Source Patient Position - Orthostatic VS BP Location FiO2 (%)   -- 07/04/23 1225 07/04/23 1225 07/04/23 1225 --    Monitor Lying Left arm       Pain Score       07/04/23 1028       9             Orthostatic Vital Signs  Vitals:    07/04/23 1225 07/04/23 1230 07/04/23 1430 07/04/23 1500   BP: 123/69 119/61 119/68 125/73   Pulse: 80 74 78 82   Patient Position - Orthostatic VS: Lying Lying Sitting Lying       Physical Exam  Vitals and nursing note reviewed. Constitutional:       General: She is in acute distress. Appearance: She is well-developed. Comments: Patient appears uncomfortable sitting in dark   HENT:      Head: Normocephalic and atraumatic. Right Ear: External ear normal.      Left Ear: External ear normal.      Nose: Nose normal.      Mouth/Throat:      Mouth: Mucous membranes are moist.   Eyes:      Conjunctiva/sclera: Conjunctivae normal.      Comments: Proptosis bilateral eyes; pupils equal and reactive to light and accomidation   Cardiovascular:      Rate and Rhythm: Normal rate and regular rhythm. Heart sounds: Normal heart sounds. No murmur heard.   Pulmonary:      Effort: Pulmonary effort is normal. No respiratory distress. Breath sounds: Normal breath sounds. Abdominal:      General: Bowel sounds are normal.      Palpations: Abdomen is soft. Tenderness: There is no abdominal tenderness. Musculoskeletal:         General: No swelling. Cervical back: Rigidity present. No tenderness. Right lower leg: No edema. Left lower leg: No edema. Skin:     General: Skin is warm and dry. Capillary Refill: Capillary refill takes 2 to 3 seconds. Neurological:      General: No focal deficit present. Mental Status: She is alert and oriented to person, place, and time. Motor: Weakness present.       Gait: Gait normal.   Psychiatric:         Mood and Affect: Mood normal.         ED Medications  Medications   heparin (porcine) 25,000 units in 0.45% NaCl 250 mL infusion (premix) (11.1 Units/kg/hr × 90 kg (Order-Specific) Intravenous New Bag 7/4/23 1341)   heparin (porcine) injection 4,000 Units (has no administration in time range)   heparin (porcine) injection 2,000 Units (has no administration in time range)   multi-electrolyte (ISOLYTE-S PH 7.4) bolus 1,000 mL (0 mL Intravenous Stopped 7/4/23 1420)   metoclopramide (REGLAN) injection 10 mg (10 mg Intravenous Given 7/4/23 1113)   diphenhydrAMINE (BENADRYL) injection 25 mg (25 mg Intravenous Given 7/4/23 1113)   acetaminophen (TYLENOL) tablet 975 mg (975 mg Oral Given 7/4/23 1111)   iodixanol (VISIPAQUE) 320 MG/ML injection 120 mL (120 mL Intravenous Given 7/4/23 1221)   heparin (porcine) injection 4,000 Units (4,000 Units Intravenous Given 7/4/23 1339)       Diagnostic Studies  Results Reviewed     Procedure Component Value Units Date/Time    HS Troponin I 4hr [[id number]] Collected: 07/04/23 1511    Lab Status: No result Specimen: Blood from Arm, Right     hCG, qualitative pregnancy [[id number]]  (Normal) Collected: 07/04/23 1113    Lab Status: Final result Specimen: Blood from Arm, Right Updated: 07/04/23 1437     Preg, Serum Negative    APTT six (6) hours after Heparin bolus/drip initiation or dosing change [[id number]]  (Normal) Collected: 07/04/23 1330    Lab Status: Final result Specimen: Blood from Arm, Left Updated: 07/04/23 1417     PTT 24 seconds     Protime-INR [[id number]]  (Normal) Collected: 07/04/23 1330    Lab Status: Final result Specimen: Blood from Arm, Left Updated: 07/04/23 1417     Protime 14.5 seconds      INR 1.11    HS Troponin I 2hr [[id number]]  (Abnormal) Collected: 07/04/23 1303    Lab Status: Final result Specimen: Blood from Arm, Right Updated: 07/04/23 1337     hs TnI 2hr 3,550 ng/L      Delta 2hr hsTnI -550 ng/L     FLU/RSV/COVID - if FLU/RSV clinically relevant [[id number]]  (Normal) Collected: 07/04/23 1111    Lab Status: Final result Specimen: Nares from Nose Updated: 07/04/23 1219     SARS-CoV-2 Negative     INFLUENZA A PCR Negative     INFLUENZA B PCR Negative     RSV PCR Negative    Narrative:      FOR PEDIATRIC PATIENTS - copy/paste COVID Guidelines URL to browser: [email or web address]    SARS-CoV-2 assay is a Nucleic Acid Amplification assay intended for the  qualitative detection of nucleic acid from SARS-CoV-2 in nasopharyngeal  swabs. Results are for the presumptive identification of SARS-CoV-2 RNA. Positive results are indicative of infection with SARS-CoV-2, the virus  causing COVID-19, but do not rule out bacterial infection or co-infection  with other viruses. Laboratories within the [facility] and its  territories are required to report all positive results to the appropriate  public health authorities. Negative results do not preclude SARS-CoV-2  infection and should not be used as the sole basis for treatment or other  patient management decisions. Negative results must be combined with  clinical observations, patient history, and epidemiological information. This test has not been FDA cleared or approved.     This test has been authorized by FDA under an Emergency Use Authorization  (EUA). This test is only authorized for the duration of time the  declaration that circumstances exist justifying the authorization of the  emergency use of an in vitro diagnostic tests for detection of SARS-CoV-2  virus and/or diagnosis of COVID-19 infection under section 564(b)(1) of  the Act, 21 U. S.C. 035URZ-6(O)(8), unless the authorization is terminated  or revoked sooner. The test has been validated but independent review by FDA  and CLIA is pending. Test performed using [employer] Gene[employer]pert: This RT-PCR assay targets N2,  a region unique to SARS-CoV-2. A conserved region in the E-gene was chosen  for pan-Sarbecovirus detection which includes SARS-CoV-2. According to CMS-2020-01-R, this platform meets the definition of high-throughput technology.     TSH, 3rd generation with Free T4 reflex [[id number]]  (Normal) Collected: 07/04/23 1111    Lab Status: Final result Specimen: Blood from Arm, Right Updated: 07/04/23 1204     TSH 3RD GENERATON 0.993 uIU/mL     HS Troponin 0hr (reflex protocol) [[id number]]  (Abnormal) Collected: 07/04/23 1111    Lab Status: Final result Specimen: Blood from Arm, Right Updated: 07/04/23 1156     hs TnI 0hr 4,100 ng/L     Comprehensive metabolic panel [[id number]]  (Abnormal) Collected: 07/04/23 1111    Lab Status: Final result Specimen: Blood from Arm, Right Updated: 07/04/23 1146     Sodium 134 mmol/L      Potassium 4.2 mmol/L      Chloride 104 mmol/L      CO2 28 mmol/L      ANION GAP 2 mmol/L      BUN 9 mg/dL      Creatinine 0.97 mg/dL      Glucose 241 mg/dL      Calcium 8.9 mg/dL      Corrected Calcium 9.4 mg/dL      AST 34 U/L      ALT 31 U/L      Alkaline Phosphatase 94 U/L      Total Protein 6.7 g/dL      Albumin 3.4 g/dL      Total Bilirubin 0.44 mg/dL      eGFR 70 ml/min/1.73sq m     Narrative:      [place] guidelines for Chronic Kidney Disease (CKD):   •  Stage 1 with normal or high GFR (GFR > 90 mL/min/1.73 square meters)  •  Stage 2 Mild CKD (GFR = 60-89 mL/min/1.73 square meters)  •  Stage 3A Moderate CKD (GFR = 45-59 mL/min/1.73 square meters)  •  Stage 3B Moderate CKD (GFR = 30-44 mL/min/1.73 square meters)  •  Stage 4 Severe CKD (GFR = 15-29 mL/min/1.73 square meters)  •  Stage 5 End Stage CKD (GFR <15 mL/min/1.73 square meters)  Note: GFR calculation is accurate only with a steady state creatinine    CBC and differential [[id number]] Collected: 07/04/23 1111    Lab Status: Final result Specimen: Blood from Arm, Right Updated: 07/04/23 1128     WBC 9.83 Thousand/uL      RBC 4.91 Million/uL      Hemoglobin 14.4 g/dL      Hematocrit 43.0 %      MCV 88 fL      MCH 29.3 pg      MCHC 33.5 g/dL      RDW 12.1 %      MPV 10.3 fL      Platelets 504 Thousands/uL      nRBC 0 /100 WBCs      Neutrophils Relative 59 %      Immat GRANS % 0 %      Lymphocytes Relative 27 %      Monocytes Relative 11 %      Eosinophils Relative 2 %      Basophils Relative 1 %      Neutrophils Absolute 5.80 Thousands/µL      Immature Grans Absolute 0.04 Thousand/uL      Lymphocytes Absolute 2.66 Thousands/µL      Monocytes Absolute 1.06 Thousand/µL      Eosinophils Absolute 0.22 Thousand/µL      Basophils Absolute 0.05 Thousands/µL     Lyme Antibody Profile with reflex to [facility] [[id number]] Collected: 07/04/23 1111    Lab Status: In process Specimen: Blood from Arm, Right Updated: 07/04/23 1124                 CTA head and neck with and without contrast   Final Result by SALVADOR Sanchez MD (07/04 1305)      No acute intracranial pathology. No significant stenosis or dissection of the cervical carotid or vertebral arteries. No significant intracranial stenosis, large vessel occlusion or aneurysm. Workstation performed: [id number]         CTA dissection protocol chest/abdomen/pelvis   Final Result by Norris Gutierrez MD (07/04 1300)      No evidence of aortic dissection.                Workstation performed: [id number]               Procedures  ECG 12 Lead Documentation Only    Date/Time: 7/4/2023 12:03 PM    Performed by: Marco Luz DO  Authorized by: Marco Luz DO    Indications / Diagnosis:  Cp  Patient location:  ED  Previous ECG:     Previous ECG:  Compared to current  Interpretation:     Interpretation: abnormal    Rate:     ECG rate:  76    ECG rate assessment: normal    Rhythm:     Rhythm: sinus rhythm    Ectopy:     Ectopy: none    QRS:     QRS axis:  Normal    QRS intervals:  Normal  ST segments:     ST segments:  Non-specific  T waves:     T waves: inverted      Inverted:  I and aVL          ED Course  ED Course as of 07/04/23 1512   Tue Jul 04, 2023   1038 Blood Pressure: 138/71   1038 Temperature(!): 97 °F (36.1 °C)   1038 Pulse: 89   1038 Respirations: 16   1038 SpO2: 96 %   1110 Patient is a 68-year-old female presenting to the emergency department for evaluation of headache with associated symptoms. Patient states she was seen at outside facility was told she had an abnormal EKG and encouraged admission however there was no rooms available so she wanted to go home. After speaking with the patient differential diagnosis includes but is not limited to subarachnoid hemorrhage neck pain from the blood irritation of the meninges which we will attempt to rule out with a CTA head and neck however given that it is day 5 the blood may not be visible and may require LP to rule out, intracerebral mass which we will rule out with CTA head and neck, with the chest pain that the patient is experiencing we will do cardiac work-up troponin EKG to look for acute myocardial infarction or cerebral T waves, will evaluate for thyroid storm with TSH with reflex T4, will rule out pregnancy with hCG qualitative will rule out Lyme with a Lyme profile we will also evaluate patient for COVID flu RSV with a 2 swab.   Meningitis also has to stay on our differential however it is lower on my differential. Spoke to patient her  we will rule out all the other possible causes first and then speak about LP as last resort for viral causes. We will also treat patient's symptoms with isolate Reglan Benadryl and Tylenol for a migraine cocktail. Patient is aware and has no questions or concerns we will frequently reevaluate. 1136 WBC: 9.83  No leukocytosis    1136 Hemoglobin: 14.4   1136 Platelet Count: 746   1159 hs TnI 0hr(!): 4,100  Informed patietn of the elvated trop; consistent with NSTEMI. Informed her that I want to get the CT head and neck done first to rule out bleeding in the brain also. Discussed that the patient is having an NSTEMI and will need heparinization. Will hold off on heparin until CT head. 91 21 06 Bringing her to ct now   65 Calling for stat ct head read; possible avm   1318 Reached out to [facility] for admission   1402 Cardiology aware of patient. Patient aware of all lab and imaging fidings. Patient resting comfortably at this time. MDM      Disposition  Final diagnoses:   NSTEMI (non-ST elevated myocardial infarction) ([address])   Headache   Neck pain     Time reflects when diagnosis was documented in both MDM as applicable and the Disposition within this note     Time User Action Codes Description Comment    7/4/2023  1:37 PM Mellisa Xu Add [I21.4] NSTEMI (non-ST elevated myocardial infarction) ([address])     7/4/2023  1:37 PM Mellisa Xu Add [R51.9] Headache     7/4/2023  1:37 PM Mellisa Maplewood Add [M54.2] Neck pain       ED Disposition     ED Disposition   Admit    Condition   Stable    Date/Time   Tue Jul 4, 2023  1:37 PM    Comment   Case was discussed with           Follow-up Information    None         Patient's Medications    No medications on file     No discharge procedures on file. PDMP Review     None           ED Provider  Attending physically available and evaluated Baljit Moralez.  I managed the patient along with the ED Attending.     Electronically Signed by         Jimenez Louise DO  07/04/23 9789

## 2023-07-04 NOTE — Clinical Note
Left heart catheterization: A catheter was advanced over a guidewire into the ascending aorta. After recording ascending aortic pressure, the catheter was advanced across the aortic valve and left ventricular pressure was recorded. The catheter was   pulled back across the aortic valve and into the ascending aorta and pullback pressures were obtained.

## 2023-07-04 NOTE — ED NOTES
Pt c/o of similar throat pain as before. Pressure hypertensive. 0.4mg NTG SL given. Pressure improved, symptoms relieved.       Zoraida Campbell IV, RN  07/04/23 6307

## 2023-07-04 NOTE — ED NOTES
Pt wearing continuous insulin pump, reports being a controlled diabetic for 30+ years. Per Dr. Rohit Arroyo, pt allowed to keep pump connected. Charge nurse aware.       Richard Rust RN  07/04/23 5326

## 2023-07-04 NOTE — Clinical Note
The right coronary artery was injected and visualized. Multiple views of the injected vessel were taken.

## 2023-07-04 NOTE — ASSESSMENT & PLAN NOTE
Patient reported to the ED staff of spending a lot of time outdoors last several days and removing several ticks off her body  Lyme disease work-up underway

## 2023-07-05 ENCOUNTER — APPOINTMENT (INPATIENT)
Dept: NON INVASIVE DIAGNOSTICS | Facility: HOSPITAL | Age: 48
DRG: 247 | End: 2023-07-05
Payer: COMMERCIAL

## 2023-07-05 LAB
AORTIC ROOT: 2.4 CM
AORTIC VALVE MEAN VELOCITY: 18.5 M/S
APICAL FOUR CHAMBER EJECTION FRACTION: 71 %
APTT PPP: 57 SECONDS (ref 23–37)
APTT PPP: 65 SECONDS (ref 23–37)
ASCENDING AORTA: 3 CM
AV LVOT MEAN GRADIENT: 3 MMHG
AV LVOT PEAK GRADIENT: 6 MMHG
AV MEAN GRADIENT: 17 MMHG
AV PEAK GRADIENT: 33 MMHG
AV VALVE AREA: 1.6 CM2
AV VELOCITY RATIO: 0.41
B BURGDOR IGG+IGM SER-ACNC: 0.3 AI
B BURGDOR IGG+IGM SER-ACNC: 0.3 AI
CARDIAC TROPONIN I PNL SERPL HS: 2673 NG/L (ref 8–18)
CHOLEST SERPL-MCNC: 105 MG/DL
DOP CALC AO PEAK VEL: 2.87 M/S
DOP CALC AO VTI: 46.01 CM
DOP CALC LVOT AREA: 3.14 CM2
DOP CALC LVOT DIAMETER: 2 CM
DOP CALC LVOT PEAK VEL VTI: 23.49 CM
DOP CALC LVOT PEAK VEL: 1.19 M/S
DOP CALC LVOT STROKE VOLUME: 73.76 CM3
E WAVE DECELERATION TIME: 205 MS
EST. AVERAGE GLUCOSE BLD GHB EST-MCNC: 226 MG/DL
FRACTIONAL SHORTENING: 39 (ref 28–44)
GLUCOSE SERPL-MCNC: 133 MG/DL (ref 65–140)
GLUCOSE SERPL-MCNC: 157 MG/DL (ref 65–140)
GLUCOSE SERPL-MCNC: 212 MG/DL (ref 65–140)
GLUCOSE SERPL-MCNC: 318 MG/DL (ref 65–140)
GLUCOSE SERPL-MCNC: 60 MG/DL (ref 65–140)
HBA1C MFR BLD: 9.5 %
HDLC SERPL-MCNC: 57 MG/DL
INTERVENTRICULAR SEPTUM IN DIASTOLE (PARASTERNAL SHORT AXIS VIEW): 1.2 CM
INTERVENTRICULAR SEPTUM: 1.2 CM (ref 0.6–1.1)
KCT BLD-ACNC: 384 SEC (ref 89–137)
KCT BLD-ACNC: 510 SEC (ref 89–137)
LAAS-AP2: 16.5 CM2
LAAS-AP4: 14.3 CM2
LDLC SERPL CALC-MCNC: 33 MG/DL (ref 0–100)
LEFT ATRIUM SIZE: 4.1 CM
LEFT ATRIUM VOLUME (MOD BIPLANE): 39 ML
LEFT INTERNAL DIMENSION IN SYSTOLE: 2.5 CM (ref 2.1–4)
LEFT VENTRICULAR INTERNAL DIMENSION IN DIASTOLE: 4.1 CM (ref 3.5–6)
LEFT VENTRICULAR POSTERIOR WALL IN END DIASTOLE: 0.9 CM
LEFT VENTRICULAR STROKE VOLUME: 52 ML
LVSV (TEICH): 52 ML
MV E'TISSUE VEL-SEP: 7 CM/S
MV PEAK A VEL: 0.97 M/S
MV PEAK E VEL: 98 CM/S
MV STENOSIS PRESSURE HALF TIME: 60 MS
MV VALVE AREA P 1/2 METHOD: 3.67
NONHDLC SERPL-MCNC: 48 MG/DL
RIGHT VENTRICLE ID DIMENSION: 3.1 CM
SL CV LEFT ATRIUM LENGTH A2C: 4.8 CM
SL CV LV EF: 65
SL CV PED ECHO LEFT VENTRICLE DIASTOLIC VOLUME (MOD BIPLANE) 2D: 74 ML
SL CV PED ECHO LEFT VENTRICLE SYSTOLIC VOLUME (MOD BIPLANE) 2D: 22 ML
SPECIMEN SOURCE: ABNORMAL
SPECIMEN SOURCE: ABNORMAL
TRICUSPID ANNULAR PLANE SYSTOLIC EXCURSION: 2.6 CM
TRIGL SERPL-MCNC: 73 MG/DL

## 2023-07-05 PROCEDURE — 99152 MOD SED SAME PHYS/QHP 5/>YRS: CPT | Performed by: INTERNAL MEDICINE

## 2023-07-05 PROCEDURE — 99255 IP/OBS CONSLTJ NEW/EST HI 80: CPT | Performed by: INTERNAL MEDICINE

## 2023-07-05 PROCEDURE — 82948 REAGENT STRIP/BLOOD GLUCOSE: CPT

## 2023-07-05 PROCEDURE — 36415 COLL VENOUS BLD VENIPUNCTURE: CPT | Performed by: INTERNAL MEDICINE

## 2023-07-05 PROCEDURE — C1894 INTRO/SHEATH, NON-LASER: HCPCS | Performed by: INTERNAL MEDICINE

## 2023-07-05 PROCEDURE — 92928 PRQ TCAT PLMT NTRAC ST 1 LES: CPT | Performed by: INTERNAL MEDICINE

## 2023-07-05 PROCEDURE — C1769 GUIDE WIRE: HCPCS | Performed by: INTERNAL MEDICINE

## 2023-07-05 PROCEDURE — 85730 THROMBOPLASTIN TIME PARTIAL: CPT | Performed by: INTERNAL MEDICINE

## 2023-07-05 PROCEDURE — 92978 ENDOLUMINL IVUS OCT C 1ST: CPT | Performed by: INTERNAL MEDICINE

## 2023-07-05 PROCEDURE — C1874 STENT, COATED/COV W/DEL SYS: HCPCS | Performed by: INTERNAL MEDICINE

## 2023-07-05 PROCEDURE — 84484 ASSAY OF TROPONIN QUANT: CPT | Performed by: INTERNAL MEDICINE

## 2023-07-05 PROCEDURE — 4A023N7 MEASUREMENT OF CARDIAC SAMPLING AND PRESSURE, LEFT HEART, PERCUTANEOUS APPROACH: ICD-10-PCS | Performed by: INTERNAL MEDICINE

## 2023-07-05 PROCEDURE — B2111ZZ FLUOROSCOPY OF MULTIPLE CORONARY ARTERIES USING LOW OSMOLAR CONTRAST: ICD-10-PCS | Performed by: INTERNAL MEDICINE

## 2023-07-05 PROCEDURE — 86618 LYME DISEASE ANTIBODY: CPT | Performed by: INTERNAL MEDICINE

## 2023-07-05 PROCEDURE — 027034Z DILATION OF CORONARY ARTERY, ONE ARTERY WITH DRUG-ELUTING INTRALUMINAL DEVICE, PERCUTANEOUS APPROACH: ICD-10-PCS | Performed by: INTERNAL MEDICINE

## 2023-07-05 PROCEDURE — C1753 CATH, INTRAVAS ULTRASOUND: HCPCS | Performed by: INTERNAL MEDICINE

## 2023-07-05 PROCEDURE — 93458 L HRT ARTERY/VENTRICLE ANGIO: CPT | Performed by: INTERNAL MEDICINE

## 2023-07-05 PROCEDURE — 99153 MOD SED SAME PHYS/QHP EA: CPT | Performed by: INTERNAL MEDICINE

## 2023-07-05 PROCEDURE — 85347 COAGULATION TIME ACTIVATED: CPT

## 2023-07-05 PROCEDURE — 93306 TTE W/DOPPLER COMPLETE: CPT

## 2023-07-05 PROCEDURE — 99232 SBSQ HOSP IP/OBS MODERATE 35: CPT | Performed by: FAMILY MEDICINE

## 2023-07-05 PROCEDURE — 80061 LIPID PANEL: CPT | Performed by: INTERNAL MEDICINE

## 2023-07-05 PROCEDURE — 83036 HEMOGLOBIN GLYCOSYLATED A1C: CPT | Performed by: INTERNAL MEDICINE

## 2023-07-05 PROCEDURE — C9600 PERC DRUG-EL COR STENT SING: HCPCS | Performed by: INTERNAL MEDICINE

## 2023-07-05 PROCEDURE — C1887 CATHETER, GUIDING: HCPCS | Performed by: INTERNAL MEDICINE

## 2023-07-05 PROCEDURE — 93306 TTE W/DOPPLER COMPLETE: CPT | Performed by: INTERNAL MEDICINE

## 2023-07-05 PROCEDURE — C1725 CATH, TRANSLUMIN NON-LASER: HCPCS | Performed by: INTERNAL MEDICINE

## 2023-07-05 DEVICE — XIENCE SKYPOINT™ EVEROLIMUS ELUTING CORONARY STENT SYSTEM 3.00 MM X 33 MM / RAPID-EXCHANGE
Type: IMPLANTABLE DEVICE | Site: HEART | Status: FUNCTIONAL
Brand: XIENCE SKYPOINT™

## 2023-07-05 RX ORDER — LIDOCAINE HYDROCHLORIDE 10 MG/ML
INJECTION, SOLUTION EPIDURAL; INFILTRATION; INTRACAUDAL; PERINEURAL CODE/TRAUMA/SEDATION MEDICATION
Status: DISCONTINUED | OUTPATIENT
Start: 2023-07-05 | End: 2023-07-05 | Stop reason: HOSPADM

## 2023-07-05 RX ORDER — MIDAZOLAM HYDROCHLORIDE 2 MG/2ML
INJECTION, SOLUTION INTRAMUSCULAR; INTRAVENOUS CODE/TRAUMA/SEDATION MEDICATION
Status: DISCONTINUED | OUTPATIENT
Start: 2023-07-05 | End: 2023-07-05 | Stop reason: HOSPADM

## 2023-07-05 RX ORDER — HEPARIN SODIUM 1000 [USP'U]/ML
INJECTION, SOLUTION INTRAVENOUS; SUBCUTANEOUS CODE/TRAUMA/SEDATION MEDICATION
Status: DISCONTINUED | OUTPATIENT
Start: 2023-07-05 | End: 2023-07-05 | Stop reason: HOSPADM

## 2023-07-05 RX ORDER — SODIUM CHLORIDE 9 MG/ML
100 INJECTION, SOLUTION INTRAVENOUS CONTINUOUS
Status: DISPENSED | OUTPATIENT
Start: 2023-07-05 | End: 2023-07-05

## 2023-07-05 RX ORDER — ACETAMINOPHEN 325 MG/1
975 TABLET ORAL EVERY 6 HOURS PRN
Status: DISCONTINUED | OUTPATIENT
Start: 2023-07-05 | End: 2023-07-06 | Stop reason: HOSPADM

## 2023-07-05 RX ORDER — SODIUM CHLORIDE 9 MG/ML
INJECTION, SOLUTION INTRAVENOUS
Status: COMPLETED | OUTPATIENT
Start: 2023-07-05 | End: 2023-07-05

## 2023-07-05 RX ORDER — NITROGLYCERIN 20 MG/100ML
INJECTION INTRAVENOUS CODE/TRAUMA/SEDATION MEDICATION
Status: DISCONTINUED | OUTPATIENT
Start: 2023-07-05 | End: 2023-07-05 | Stop reason: HOSPADM

## 2023-07-05 RX ORDER — VERAPAMIL HCL 2.5 MG/ML
AMPUL (ML) INTRAVENOUS CODE/TRAUMA/SEDATION MEDICATION
Status: DISCONTINUED | OUTPATIENT
Start: 2023-07-05 | End: 2023-07-05 | Stop reason: HOSPADM

## 2023-07-05 RX ORDER — FENTANYL CITRATE 50 UG/ML
INJECTION, SOLUTION INTRAMUSCULAR; INTRAVENOUS CODE/TRAUMA/SEDATION MEDICATION
Status: DISCONTINUED | OUTPATIENT
Start: 2023-07-05 | End: 2023-07-05 | Stop reason: HOSPADM

## 2023-07-05 RX ADMIN — PANTOPRAZOLE SODIUM 40 MG: 40 TABLET, DELAYED RELEASE ORAL at 08:58

## 2023-07-05 RX ADMIN — LISINOPRIL 10 MG: 10 TABLET ORAL at 08:58

## 2023-07-05 RX ADMIN — HEPARIN SODIUM 17.1 UNITS/KG/HR: 10000 INJECTION, SOLUTION INTRAVENOUS at 08:58

## 2023-07-05 RX ADMIN — ACETAMINOPHEN 975 MG: 325 TABLET ORAL at 23:50

## 2023-07-05 RX ADMIN — SODIUM CHLORIDE 100 ML/HR: 0.9 INJECTION, SOLUTION INTRAVENOUS at 16:46

## 2023-07-05 RX ADMIN — HEPARIN SODIUM 2000 UNITS: 1000 INJECTION INTRAVENOUS; SUBCUTANEOUS at 07:26

## 2023-07-05 RX ADMIN — ASPIRIN 81 MG: 81 TABLET, COATED ORAL at 08:58

## 2023-07-05 RX ADMIN — DULOXETINE 30 MG: 30 CAPSULE, DELAYED RELEASE ORAL at 13:42

## 2023-07-05 RX ADMIN — INSULIN ASPART 16 UNITS: 100 INJECTION, SOLUTION INTRAVENOUS; SUBCUTANEOUS at 22:54

## 2023-07-05 RX ADMIN — ATORVASTATIN CALCIUM 40 MG: 40 TABLET, FILM COATED ORAL at 16:46

## 2023-07-05 RX ADMIN — PERFLUTREN 0.6 ML/MIN: 6.52 INJECTION, SUSPENSION INTRAVENOUS at 11:45

## 2023-07-05 RX ADMIN — ACETAMINOPHEN 975 MG: 325 TABLET ORAL at 16:46

## 2023-07-05 RX ADMIN — HYDROCHLOROTHIAZIDE 12.5 MG: 12.5 TABLET ORAL at 08:58

## 2023-07-05 NOTE — PLAN OF CARE
Problem: PAIN - ADULT  Goal: Verbalizes/displays adequate comfort level or baseline comfort level  Description: Interventions:  - Encourage patient to monitor pain and request assistance  - Assess pain using appropriate pain scale  - Administer analgesics based on type and severity of pain and evaluate response  - Implement non-pharmacological measures as appropriate and evaluate response  - Consider cultural and social influences on pain and pain management  - Notify physician/advanced practitioner if interventions unsuccessful or patient reports new pain  Outcome: Progressing     Problem: INFECTION - ADULT  Goal: Absence or prevention of progression during hospitalization  Description: INTERVENTIONS:  - Assess and monitor for signs and symptoms of infection  - Monitor lab/diagnostic results  - Monitor all insertion sites, i.e. indwelling lines, tubes, and drains  - Monitor endotracheal if appropriate and nasal secretions for changes in amount and color  - [place] appropriate cooling/warming therapies per order  - Administer medications as ordered  - Instruct and encourage patient and family to use good hand hygiene technique  - Identify and instruct in appropriate isolation precautions for identified infection/condition  Outcome: Progressing  Goal: Absence of fever/infection during neutropenic period  Description: INTERVENTIONS:  - Monitor WBC    Outcome: Progressing     Problem: SAFETY ADULT  Goal: Patient will remain free of falls  Description: INTERVENTIONS:  - Educate patient/family on patient safety including physical limitations  - Instruct patient to call for assistance with activity   - Consult OT/PT to assist with strengthening/mobility   - Keep Call bell within reach  - Keep bed low and locked with side rails adjusted as appropriate  - Keep care items and personal belongings within reach  - Initiate and maintain comfort rounds  - Make Fall Risk Sign visible to staff  - Offer Toileting every  Hours, in advance of need  - Initiate/Maintain alarm  - Obtain necessary fall risk management equipment:   - Apply yellow socks and bracelet for high fall risk patients  - Consider moving patient to room near nurses station  Outcome: Progressing  Goal: Maintain or return to baseline ADL function  Description: INTERVENTIONS:  -  Assess patient's ability to carry out ADLs; assess patient's baseline for ADL function and identify physical deficits which impact ability to perform ADLs (bathing, care of mouth/teeth, toileting, grooming, dressing, etc.)  - Assess/evaluate cause of self-care deficits   - Assess range of motion  - Assess patient's mobility; develop plan if impaired  - Assess patient's need for assistive devices and provide as appropriate  - Encourage maximum independence but intervene and supervise when necessary  - Involve family in performance of ADLs  - Assess for home care needs following discharge   - Consider OT consult to assist with ADL evaluation and planning for discharge  - Provide patient education as appropriate  Outcome: Progressing  Goal: Maintains/Returns to pre admission functional level  Description: INTERVENTIONS:  - Perform BMAT or MOVE assessment daily.   - Set and communicate daily mobility goal to care team and patient/family/caregiver. - Collaborate with rehabilitation services on mobility goals if consulted  - Perform Range of Motion  times a day. - Reposition patient every  hours.   - Dangle patient  times a day  - Stand patient  times a day  - Ambulate patient  times a day  - Out of bed to chair  times a day   - Out of bed for meals  times a day  - Out of bed for toileting  - Record patient progress and toleration of activity level   Outcome: Progressing     Problem: DISCHARGE PLANNING  Goal: Discharge to home or other facility with appropriate resources  Description: INTERVENTIONS:  - Identify barriers to discharge w/patient and caregiver  - Arrange for needed discharge resources and transportation as appropriate  - Identify discharge learning needs (meds, wound care, etc.)  - Arrange for interpretive services to assist at discharge as needed  - Refer to Case Management Department for coordinating discharge planning if the patient needs post-hospital services based on physician/advanced practitioner order or complex needs related to functional status, cognitive ability, or social support system  Outcome: Progressing     Problem: Knowledge Deficit  Goal: Patient/family/caregiver demonstrates understanding of disease process, treatment plan, medications, and discharge instructions  Description: Complete learning assessment and assess knowledge base.   Interventions:  - Provide teaching at level of understanding  - Provide teaching via preferred learning methods  Outcome: Progressing

## 2023-07-05 NOTE — ASSESSMENT & PLAN NOTE
Presents with a constellation of progressive symptoms including chest discomfort with radiation to the right shoulder arm and jaw along with further radiation intermittently into the back of the neck/head - was sent in to the ED to rule out possible "meningitis" -> over the last few days, has also noted occasional episodes of diaphoresis, sore throat, and dizziness with above episodes -> peak of headache intensity was approximately 3-4 days ago with transient improvement  Denies fever/chills or persistent nuchal rigidity - WBC count normal with an unremarkable cell differential -> less likely suspicion of meningitis clinically at this time  Consider atypical presentation of an acute coronary event in a female ? ??   Further work-up in the ED revealed evidence of a significantly elevated high sensitivity troponin of 4100 with mild subsequent downtrending  Initiated on a heparin drip   CT angiogram of head/neck negative for acute intracranial etiology or intravascular issues - CT angiogram of chest/abdomen/pelvis negative for aortic dissection  Await echocardiogram to assess for structural heart disease and/or evaluation for stress cardiomyopathy. Initiate ASA regimen - continue high intensity statin -> check updated A1c and fasting lipid panel   PRN SL NTG on board  Telemetry monitoring  Remains hemodynamically stable  Currently NPO for LEFT HEART CATH today.

## 2023-07-05 NOTE — CASE MANAGEMENT
Case Management Assessment & Discharge Planning Note    Patient name Chrisitan Lopez  Location /-15 MRN [id number]  : 1975 Date 2023       Current Admission Date: 2023  Current Admission Diagnosis:NSTEMI (non-ST elevated myocardial infarction)   Patient Active Problem List    Diagnosis Date Noted   • NSTEMI (non-ST elevated myocardial infarction) 2023   • Insulin dependent type 1 diabetes mellitus 2023   • Essential hypertension 2023   • Fibromyalgia 2023   • Morbid obesity 2023   • Hyperlipidemia 2023   • Anxiety 2023   • Tick bites/exposure 2023      LOS (days): 1  Geometric Mean LOS (GMLOS) (days):   Days to GMLOS:     OBJECTIVE:    Risk of Unplanned Readmission Score: 9.22         Current admission status: Inpatient       Preferred Pharmacy:   [address] [facility], [address] [facility]. [address]  Phone: [phone] Fax: [phone]    Primary Care Provider: Purnima Nielsen MD    Primary Insurance: BLUE CROSS  Secondary Insurance:     ASSESSMENT:  [facility] Proxies    There are no active Health Care Proxies on file.        Advance Directives  Does patient have a [address]?: No  Was patient offered paperwork?: Yes (refused)  Does patient currently have a Health Care decision maker?: Yes, please see Health Care Proxy section  Does patient have Advance Directives?: No  Was patient offered paperwork?: Yes (refused)  Primary Contact: Jhony Jamison (Spouse)   [phone]         Readmission Root Cause  30 Day Readmission: No    Patient Information  Admitted from[de-identified] Home  Mental Status: Alert  During Assessment patient was accompanied by: Not accompanied during assessment  Assessment information provided by[de-identified] Patient  Primary Caregiver: Self  Support Systems: Self, Spouse/significant other  [place] of Residence: [address] do you live in?: Vinny REYES/ [facility] [place] entry access options. Select all that apply.: Stairs  Number of steps to enter home.: 4  Do the steps have railings?: Yes  Type of Current Residence: 2 story home  Upon entering residence, is there a bedroom on the main floor (no further steps)?: No  A bedroom is located on the following floor levels of residence (select all that apply):: 2nd Floor  Upon entering residence, is there a bathroom on the main floor (no further steps)?: Yes  Number of steps to 2nd floor from main floor: One Flight  In the last 12 months, was there a time when you were not able to pay the mortgage or rent on time?: No  In the last 12 months, how many places have you lived?: 1  In the last 12 months, was there a time when you did not have a steady place to sleep or slept in a shelter (including now)?: No  Homeless/housing insecurity resource given?: N/A  Living Arrangements: Lives w/ Spouse/significant other    Activities of Daily Living Prior to Admission  Functional Status: Independent  Completes ADLs independently?: Yes  Ambulates independently?: Yes  Does patient use assisted devices?: Yes  Assisted Devices (DME) used: CPAP, Other (Comment) (insulin pump)  DME Company Name (respiratory supplies):  [employer]  Does patient currently own DME?: Yes  What DME does the patient currently own?: CPAP, Other (Comment) (insulin pump)  Does patient have a history of Outpatient Therapy (PT/OT)?: Yes ([facility])  Does the patient have a history of Short-Term Rehab?: No  Does patient have a history of HHC?: No  Does patient currently have [address] [facility]?: No         Patient Information Continued  Income Source: Employed  Does patient have prescription coverage?: Yes (Jhonathan Rice)  Within the past 12 months, you worried that your food would run out before you got the money to buy more.: Never true  Within the past 12 months, the food you bought just didn't last and you didn't have money to get more.: Never true  Food insecurity resource given?: N/A  Does patient receive dialysis treatments?: No  Does patient have a history of substance abuse?: No  Does patient have a history of Mental Health Diagnosis?: Yes (anxiety, depression  PMD manages)  Is patient receiving treatment for mental health?: No. Patient declined treatment information. Has patient received inpatient treatment related to mental health in the last 2 years?: No         Means of Transportation  Means of Transport to Appts[de-identified] Drives Self  In the past 12 months, has lack of transportation kept you from medical appointments or from getting medications?: No  In the past 12 months, has lack of transportation kept you from meetings, work, or from getting things needed for daily living?: No  Was application for public transport provided?: N/A        DISCHARGE DETAILS:    Discharge planning discussed with[de-identified] patient  Freedom of Choice: Yes     CM contacted family/caregiver?: No- see comments (refused)  Were Treatment Team discharge recommendations reviewed with patient/caregiver?: Yes  Did patient/caregiver verbalize understanding of patient care needs?: Yes  Were patient/caregiver advised of the risks associated with not following Treatment Team discharge recommendations?: Yes    Contacts  Patient Contacts: Silvestre Dickson (Spouse)   [phone]  Relationship to Patient[de-identified] [facility]          Is the patient interested in [facility] at discharge?: No    DME Referral Provided  Referral made for DME?: No         Would you like to participate in our [address] service program?  : No - Declined        Additional Comments: Patient states she wants to transition her care from [facility] to [facility]. Patient was encouraged to contact her health insurance to find a PMD in-network.   CM to be available      CM reviewed d/c planning process including the following: identifying help at home, patient preference for d/c planning needs, Discharge Lounge, [facility] Meds to Bed program, availability of treatment team to discuss questions or concerns patient and/or family may have regarding understanding medications and recognizing signs and symptoms once discharged. CM also encouraged patient to follow up with all recommended appointments after discharge. Patient advised of importance for patient and family to participate in managing patient’s medical well being.

## 2023-07-05 NOTE — CONSULTS
Consultation - General Cardiology Team 2  Christian Lopez 57 y.o. female MRN: [id number]  Unit/Bed#: -01 Encounter: [id number]      Inpatient consult to Cardiology  Consult performed by: Courtney Corley MD  Consult ordered by: Araceli Valenzuela MD        PCP: Purnima Nielsen MD   Outpatient Cardiologist: None    History of Present Illness   Physician Requesting Consult: Nancy Almendarez MD  Reason for Consult / Principal Problem: NSTEMI    HPI: Christian Lopez is a 57y.o. year old female with a history of T1DM, HTN, hyperlipidemia, and morbid obesity who initially presented to [address] [place] [address] ED on 6/30/23 with complaints of right-sided chest pain, neck pain, headache, and right arm paresthesias. Patient eventually left due to long wait times but was encouraged to return by her PCP given worsening symptoms and presentation. On her representation on 7/4/23, she was found to have elevated troponin (4,100 initially, followed by 3,550 and 2,812, respectively); EKG performed in the ED showed sinus rhythm with T-wave inversions in leads I and aVL. Also of note, CTA did not show any evidence of dissection, carotid, vertebral, or intracranial disease. Lyme tests were drawn as patient endorsed spending time outdoors and having removed several ticks recently. Hemoglobin A1c was 9.5%. Given these findings and her constellation of symptoms, the patient was admitted and Cardiology has been consulted for management of her NSTEMI. Today, the patient reports that her symptoms have largely resolved following administration of nitroglycerin; at present, her only complaint is mild neck pain that she attributes to lying in bed. She denies experiencing any similar events prior to this and does not report any personal or family cardiac medical history. She denies any current chest pain, SOB, palpitations, paresthesias, lightheadedness, dizziness, or orthopnea. Review of Systems  ROS as noted above.        Historical Information   Past Medical History:   Diagnosis Date   • Diabetes mellitus ([address])    • Graves disease    • High cholesterol    • Hypertension      Past Surgical History:   Procedure Laterality Date   • ABDOMINAL SURGERY      Gallbladder removal   • HYSTERECTOMY       Social History     Substance and Sexual Activity   Alcohol Use Never     Social History     Substance and Sexual Activity   Drug Use Never     Social History     Tobacco Use   Smoking Status Never   Smokeless Tobacco Never     Family History: non-contributory    Meds/Allergies   Hospital Medications:   Current Facility-Administered Medications   Medication Dose Route Frequency   • acetaminophen (TYLENOL) tablet 975 mg  975 mg Oral Q6H PRN   • ALPRAZolam (XANAX) tablet 1 mg  1 mg Oral Daily PRN   • aspirin (ECOTRIN LOW STRENGTH) EC tablet 81 mg  81 mg Oral Daily   • atorvastatin (LIPITOR) tablet 40 mg  40 mg Oral Daily With Dinner   • dextromethorphan-guaiFENesin (ROBITUSSIN DM) oral syrup 10 mL  10 mL Oral Q4H PRN   • DULoxetine (CYMBALTA) delayed release capsule 30 mg  30 mg Oral Daily   • heparin (porcine) 25,000 units in 0.45% NaCl 250 mL infusion (premix)  3-20 Units/kg/hr (Order-Specific) Intravenous Titrated   • heparin (porcine) injection 2,000 Units  2,000 Units Intravenous Q6H PRN   • heparin (porcine) injection 4,000 Units  4,000 Units Intravenous Q6H PRN   • hydrochlorothiazide (HYDRODIURIL) tablet 12.5 mg  12.5 mg Oral Daily   • insulin aspart (NovoLOG) FOR PUMP REFILLS 120 Units  120 Units Subcutaneous Insulin Pump Daily PRN   • lisinopril (ZESTRIL) tablet 10 mg  10 mg Oral Daily   • nitroglycerin (NITROSTAT) SL tablet 0.4 mg  0.4 mg Sublingual Q5 Min PRN   • ondansetron (ZOFRAN) injection 4 mg  4 mg Intravenous Q4H PRN   • pantoprazole (PROTONIX) EC tablet 40 mg  40 mg Oral Early Morning     Home Medications:   Medications Prior to Admission   Medication   • ALPRAZolam (XANAX) 1 mg tablet   • atorvastatin (LIPITOR) 40 mg tablet   • cholecalciferol (VITAMIN D3) 1,000 units tablet   • DULoxetine (CYMBALTA) 30 mg delayed release capsule   • HYDROcodone-acetaminophen (NORCO) 5-325 mg per tablet   • hydrOXYzine HCL (ATARAX) 25 mg tablet   • omeprazole (PriLOSEC) 20 mg delayed release capsule       Allergies   Allergen Reactions   • Percocet [Oxycodone-Acetaminophen] Vomiting   • Victoza [Liraglutide] GI Intolerance       Objective   Vitals: Blood pressure 123/70, pulse 73, temperature 99.3 °F (37.4 °C), resp. rate 17, height 5' 4" (1.626 m), weight 110 kg (242 lb), SpO2 94 %. Orthostatic Blood Pressures    Flowsheet Row Most Recent Value   Blood Pressure 123/70 filed at 07/05/2023 1153   Patient Position - Orthostatic VS Lying filed at 07/05/2023 0847            Invasive Devices     Peripheral Intravenous Line  Duration           Peripheral IV 07/04/23 Right Antecubital 1 day    Peripheral IV 07/04/23 Left Forearm <1 day                Physical Exam:    GEN: Sunita Patel appears well, alert and oriented x 3, pleasant and cooperative   HEENT:  Normocephalic, atraumatic, anicteric, moist mucous membranes  NECK: No JVD or carotid bruits   HEART: Regular rhythm, normal rate, normal S1 and S2, no murmurs, clicks, gallops or rubs   LUNGS: Clear to auscultation bilaterally; no wheezes, rales, or rhonchi; respiration nonlabored   ABDOMEN:  Normoactive bowel sounds, soft, no tenderness, no distention  EXTREMITIES: peripheral pulses palpable; no edema  NEURO: no gross focal findings; cranial nerves grossly intact   SKIN:  Dry, intact, warm to touch    Lab Results:   I have personally reviewed pertinent lab results.     CBC with diff:   Results from last 7 days   Lab Units 07/04/23  1111   WBC Thousand/uL 9.83   RBC Million/uL 4.91   HEMOGLOBIN g/dL 14.4   HEMATOCRIT % 43.0   MCV fL 88   MCH pg 29.3   MCHC g/dL 33.5   RDW % 12.1   MPV fL 10.3   PLATELETS Thousands/uL 233     CMP:   Results from last 7 days   Lab Units 07/04/23  1111   SODIUM mmol/L 134*   CHLORIDE mmol/L 104 CO2 mmol/L 28   BUN mg/dL 9   CREATININE mg/dL 0.97   CALCIUM mg/dL 8.9   AST U/L 34   ALT U/L 31   ALK PHOS U/L 94   EGFR ml/min/1.73sq m 70     HS Troponin:   0   Lab Value Date/Time    HSTNI 2,673 (H) 07/05/2023 0045    HSTNI0 4,100 (H) 07/04/2023 1111    HSTNI2 3,550 (H) 07/04/2023 1303    HSTNI4 2,812 (H) 07/04/2023 1511     BMP:   Results from last 7 days   Lab Units 07/04/23  1111   POTASSIUM mmol/L 4.2   CHLORIDE mmol/L 104   CO2 mmol/L 28   BUN mg/dL 9   CREATININE mg/dL 0.97   CALCIUM mg/dL 8.9   EGFR ml/min/1.73sq m 70     Coags:   Results from last 7 days   Lab Units 07/05/23  0553 07/04/23 2038 07/04/23  1330   PTT seconds 57*   < > 24   INR   --   --  1.11    < > = values in this interval not displayed. TSH:   Results from last 7 days   Lab Units 07/04/23  1111   TSH 3RD GENERATON uIU/mL 0.993     Magnesium:     Lipid Profile:   Results from last 7 days   Lab Units 07/05/23  0553   HDL mg/dL 57   LDL CALC mg/dL 33   TRIGLYCERIDES mg/dL 73         Results from last 7 days   Lab Units 07/04/23  1111   POTASSIUM mmol/L 4.2   CO2 mmol/L 28   CHLORIDE mmol/L 104   BUN mg/dL 9   CREATININE mg/dL 0.97     Results from last 7 days   Lab Units 07/04/23  1111   HEMOGLOBIN g/dL 14.4   HEMATOCRIT % 43.0   PLATELETS Thousands/uL 233     Results from last 7 days   Lab Units 07/05/23  0553 07/04/23 2038 07/04/23  1330   PTT seconds 57* 32 24     Results from last 7 days   Lab Units 07/05/23  0553   HDL mg/dL 57   LDL CALC mg/dL 33   TRIGLYCERIDES mg/dL 73         Imaging: I have personally reviewed pertinent reports. Telemetry: no events recorded      EKG:   Date: 7/4/23  Interpretation: NSR with normal rate, T wave inversions in leads I and aVL      Previous STRESS TEST:  No results found for this or any previous visit. No results found for this or any previous visit. No results found for this or any previous visit.       Previous Cath/PCI:  No results found for this or any previous visit.      ECHO:  Results pending from study on 7/5/23      HOLTER  No results found for this or any previous visit. VTE Prophylaxis: Heparin      Assessment/Plan     This is a 57 y.o. female with PMH notable for hypertension, T1DM, morbid obesity, and hyperlipidemia who is presently admitted for management of NSTEMI. · Patient has been ASA loaded but not yet started antiplatelet agent; given possibility of multivessel disease, will hold off on this until cath occurs  · Echo ordered - results pending, will follow-up when available  · Plan to proceed with cardiac cath to assess coronary vasculature\  · Continue home statin and antihypertensives  · Rest of care as per primary team      Case discussed and reviewed with Dr. Loye Bence and is pending their agreement of the assessment and plan. Thank you for involving us in the care of your patient. Soraya Harvey MD  PGY-1, Internal Medicine     ==========================================================================================    Counseling / Coordination of Care  Total floor / unit time spent today 30 minutes minutes. Greater than 50% of total time was spent with the patient and / or family counseling and / or coordination of care. A description of the counseling / coordination of care: discussion with patient, discussion with other providers. [employer]/Care Everywhere records reviewed: yes    ** Please Note: Fluency DirectDictation voice to text software may have been used in the creation of this document.  **

## 2023-07-05 NOTE — ASSESSMENT & PLAN NOTE
Lab Results   Component Value Date    HGBA1C 9.5 (H) 07/05/2023     Has been maintained on an insulin pump for nearly 30 years per patient recollection -> currently refusing to transition to a basal/prandial insulin with sliding scale coverage  Counseled patient that certain procedures in the hospital may require transitioning off a pump for a period of time -> patient expresses understanding  Carbohydrate restricted diet  Hypoglycemia protocol

## 2023-07-05 NOTE — PROGRESS NOTES
[address] [facility]  Progress Note  Name: Lulu Montero  MRN: [id number]  Unit/Bed#: -01 I Date of Admission: 7/4/2023   Date of Service: 7/5/2023 I Hospital Day: 1    Assessment/Plan   * NSTEMI (non-ST elevated myocardial infarction)  Assessment & Plan  Presents with a constellation of progressive symptoms including chest discomfort with radiation to the right shoulder arm and jaw along with further radiation intermittently into the back of the neck/head - was sent in to the ED to rule out possible "meningitis" -> over the last few days, has also noted occasional episodes of diaphoresis, sore throat, and dizziness with above episodes -> peak of headache intensity was approximately 3-4 days ago with transient improvement  Denies fever/chills or persistent nuchal rigidity - WBC count normal with an unremarkable cell differential -> less likely suspicion of meningitis clinically at this time  Consider atypical presentation of an acute coronary event in a female ? ??   Further work-up in the ED revealed evidence of a significantly elevated high sensitivity troponin of 4100 with mild subsequent downtrending  Initiated on a heparin drip   CT angiogram of head/neck negative for acute intracranial etiology or intravascular issues - CT angiogram of chest/abdomen/pelvis negative for aortic dissection  Await echocardiogram to assess for structural heart disease and/or evaluation for stress cardiomyopathy. Initiate ASA regimen - continue high intensity statin -> check updated A1c and fasting lipid panel   PRN SL NTG on board  Telemetry monitoring  Remains hemodynamically stable  Currently NPO for LEFT HEART CATH today.     Tick bites/exposure  Assessment & Plan  Patient reported to the ED staff of spending a lot of time outdoors last several days and removing several ticks off her body  Lyme disease work-up negative    Anxiety  Assessment & Plan  C/w home PRN Xanax regimen    Hyperlipidemia  Assessment & Plan  Continue Lipitor  Await updated lipid panel in the setting of primary issue    Morbid obesity  Assessment & Plan  Lifestyle/diet modifications    Fibromyalgia  Assessment & Plan  Continue Cymbalta    Essential hypertension  Assessment & Plan  Low-sodium diet  Continue HCTZ/Zestril    Insulin dependent type 1 diabetes mellitus  Assessment & Plan  Lab Results   Component Value Date    HGBA1C 9.5 (H) 2023     Has been maintained on an insulin pump for nearly 30 years per patient recollection -> currently refusing to transition to a basal/prandial insulin with sliding scale coverage  Counseled patient that certain procedures in the hospital may require transitioning off a pump for a period of time -> patient expresses understanding  Carbohydrate restricted diet  Hypoglycemia protocol                VTE Pharmacologic Prophylaxis:   Pharmacologic: Heparin Drip  Mechanical VTE Prophylaxis in Place: Yes    Patient Centered Rounds: I have performed bedside rounds with nursing staff today. Current Length of Stay: 1 day(s)    Current Patient Status: Inpatient   Certification Statement: The patient will continue to require additional inpatient hospital stay due to pendign left heart cath    Discharge Plan: likely another 48 hours    Code Status: Level 1 - Full Code      Subjective:   Patient denies any chest pain, dyspnea, at this point. Still has occassional headache. No nausea. No vomiting. Awaiting left heart cath, ceilne NPO    Objective:     Vitals:   Temp (24hrs), Av.7 °F (37.1 °C), Min:98.1 °F (36.7 °C), Max:99.3 °F (37.4 °C)    Temp:  [98.1 °F (36.7 °C)-99.3 °F (37.4 °C)] 99.3 °F (37.4 °C)  HR:  [68-96] 73  Resp:  [15-25] 17  BP: (112-221)/() 123/70  SpO2:  [93 %-98 %] 94 %  Body mass index is 41.54 kg/m².      Input and Output Summary (last 24 hours):     No intake or output data in the 24 hours ending 23 1315    Physical Exam: Physical Exam  Constitutional:       Appearance: She is well-developed. She is obese. HENT:      Head: Normocephalic and atraumatic. Pulmonary:      Effort: Pulmonary effort is normal. No respiratory distress. Breath sounds: Normal breath sounds. Musculoskeletal:      Cervical back: Normal range of motion. Skin:     General: Skin is warm and dry.             Labs:    Results from last 7 days   Lab Units 07/04/23  1111   WBC Thousand/uL 9.83   HEMOGLOBIN g/dL 14.4   HEMATOCRIT % 43.0   PLATELETS Thousands/uL 233   NEUTROS PCT % 59   LYMPHS PCT % 27   MONOS PCT % 11   EOS PCT % 2     Results from last 7 days   Lab Units 07/04/23  1111   SODIUM mmol/L 134*   POTASSIUM mmol/L 4.2   CHLORIDE mmol/L 104   CO2 mmol/L 28   BUN mg/dL 9   CREATININE mg/dL 0.97   ANION GAP mmol/L 2   CALCIUM mg/dL 8.9   ALBUMIN g/dL 3.4*   TOTAL BILIRUBIN mg/dL 0.44   ALK PHOS U/L 94   ALT U/L 31   AST U/L 34   GLUCOSE RANDOM mg/dL 241*     Results from last 7 days   Lab Units 07/04/23  1330   INR  1.11     Results from last 7 days   Lab Units 07/05/23  1151 07/05/23  0930   POC GLUCOSE mg/dl 157* 60*     Results from last 7 days   Lab Units 07/05/23  0553   HEMOGLOBIN A1C % 9.5*              Recent Cultures (last 7 days):           Last 24 Hours Medication List:   Current Facility-Administered Medications   Medication Dose Route Frequency Provider Last Rate   • acetaminophen  975 mg Oral Q6H PRN Leda Matthews MD     • ALPRAZolam  1 mg Oral Daily PRN Danita Tucker MD     • aspirin  81 mg Oral Daily Prieto Graff DO     • atorvastatin  40 mg Oral Daily With Hilda Farmer MD     • dextromethorphan-guaiFENesin  10 mL Oral Q4H PRN Danita Tucker MD     • DULoxetine  30 mg Oral Daily Danita Tucker MD     • heparin (porcine)  3-20 Units/kg/hr (Order-Specific) Intravenous Titrated Fleet Hides Palladino, DO 17.1 Units/kg/hr (07/05/23 5251)   • heparin (porcine)  2,000 Units Intravenous Q6H PRN Darron Saxena DO     • heparin (porcine)  4,000 Units Intravenous Q6H PRN Ethelene Farrier Palladino, DO     • hydrochlorothiazide  12.5 mg Oral Daily Eliza Garcia MD     • insulin aspart  120 Units Subcutaneous Insulin Pump Daily PRN Eliza Garcia MD     • lisinopril  10 mg Oral Daily Eliza Garcia MD     • nitroglycerin  0.4 mg Sublingual Q5 Min PRN Eliza Garcia MD     • ondansetron  4 mg Intravenous Q4H PRN Eliza Garcia MD     • pantoprazole  40 mg Oral Early Morning Eliza Garcia MD          Today, Patient Was Seen By: Safia Castano MD    ** Please Note: Dictation voice to text software may have been used in the creation of this document.  **

## 2023-07-05 NOTE — UTILIZATION REVIEW
Initial Clinical Review    Admission: Date/Time/Statement:   Admission Orders (From admission, onward)     Ordered        07/04/23 1338  INPATIENT ADMISSION  Once                      Orders Placed This Encounter   Procedures   • INPATIENT ADMISSION     Standing Status:   Standing     Number of Occurrences:   1     Order Specific Question:   Level of Care     Answer:   Med Surg [16]     Order Specific Question:   Estimated length of stay     Answer:   More than 2 Midnights     Order Specific Question:   Certification     Answer:   I certify that inpatient services are medically necessary for this patient for a duration of greater than two midnights. See H&P and MD Progress Notes for additional information about the patient's course of treatment. ED Arrival Information     Expected   -    Arrival   7/4/2023 10:22    Acuity   Urgent            Means of arrival   Walk-In    Escorted by   Spouse    Service   Hospitalist    Admission type   Emergency            Arrival complaint   Headache           Chief Complaint   Patient presents with   • Neck Pain     Pt reports she was sent in to "rule out meningitis". Pt reports since Friday she has had stiff neck, jaw pain, burning throat,headache, and light sensitivity. Initial Presentation: 57 y.o. female with PMHx of IDDM, HTN, HLD, fibromyalgia, morbid obesity, anxiety who presents to ED as a walk-in with c/o chest pain with right-sided jaw/shoulder and posterior head stiffness with waxing and waning symptoms for past 3-4 days. In ED troponins elevated and started on Hep gtt. CTA c/a/p neg for aortic dissection. ASA load given. ADMIT INPATIENT to M/S UNIT with NSTEMI -- serial troponins, EKG. Telemetry. Echo, lipid panel, HgbA1c. Asa, high intensity statin. Hep gtt. Prn sl ntg. Low sodium and cons carb diet. Accuchecks w/ ssi. Continue pta po meds. Cardiology consulted. Npo after MN.  SCDs    Date: 7/5   Day 2:   Cardiology consult - Neck/CP, c/w angina, abnl ecg with lateral T wave inversions, and longstanding diabetic, severely uncontrolled, with elevated trop c/w NSTEMI needing echo, cath, and otherwise on appropriate meds including heparin. Proceed with cardiac cath. Npo. Continue po meds.          ED Triage Vitals   Temperature Pulse Respirations Blood Pressure SpO2   07/04/23 1028 07/04/23 1028 07/04/23 1028 07/04/23 1028 07/04/23 1028   (!) 97 °F (36.1 °C) 89 16 138/71 96 %      Temp Source Heart Rate Source Patient Position - Orthostatic VS BP Location FiO2 (%)   07/05/23 0847 07/04/23 1225 07/04/23 1225 07/04/23 1225 --   Oral Monitor Lying Left arm       Pain Score       07/04/23 1028       9          Wt Readings from Last 1 Encounters:   07/05/23 110 kg (242 lb)     Additional Vital Signs:   Date/Time Temp Pulse Resp BP MAP (mmHg) SpO2 Calculated FIO2 (%) - Nasal Cannula Nasal Cannula O2 Flow Rate (L/min) O2 Device Patient Position - Orthostatic VS   07/05/23 14:43:58 -- -- -- -- -- 98 % 28 2 L/min Nasal cannula --   07/05/23 11:53:20 99.3 °F (37.4 °C) 73 -- 123/70 88 94 % -- -- -- --   07/05/23 1134 -- 73 -- 123/70 -- -- -- -- -- --   07/05/23 0848 -- -- -- -- -- 95 % -- -- None (Room air) --   07/05/23 08:47:12 98.1 °F (36.7 °C) 79 17 123/73 90 95 % -- -- None (Room air) Lying   07/05/23 0554 -- 87 16 135/63 -- 98 % -- -- None (Room air) Lying   07/05/23 0100 -- 74 15 -- -- 97 % -- -- None (Room air) --   07/04/23 1936 -- -- -- 163/73 -- -- -- -- -- --   07/04/23 1930 -- 96 22 221/99 Abnormal  142 96 % -- -- -- --   07/04/23 1830 -- 86 20 127/59 85 95 % -- -- None (Room air) Sitting   07/04/23 1815 -- 89 15 167/73 105 94 % -- -- None (Room air) Sitting   07/04/23 1810 -- 96 25 Abnormal  202/125 Abnormal  155 -- -- -- -- --   07/04/23 1730 -- 84 20 112/81 86 96 % -- -- None (Room air) Lying   07/04/23 1630 -- 68 15 128/69 91 93 % -- -- None (Room air) Lying   07/04/23 1500 -- 82 16 125/73 94 98 % -- -- None (Room air) Lying   07/04/23 1430 -- 78 18 119/68 87 98 % -- -- None (Room air) Sitting   07/04/23 1230 -- 74 26 Abnormal  119/61 85 97 % -- -- None (Room air) Lying   07/04/23 1225 -- 80 22 123/69 89 97 % -- -- None (Room air) Lying   07/04/23 1028 97 °F (36.1 °C) Abnormal  89 16 138/71 -- 96 % -- -- None (Room air) --     Pertinent Labs/Diagnostic Test Results:   Cardiac cath 7/5:  •  Mid LAD lesion is 95% stenosed. Culprit of NSTEMI. S/P successful IVUS-guided PCI to the mid LAD with KSENIA x 1. Mild reisdual CAD amenable to GDMT. •  LVEDP normal without gradient on LV-AO pullback    Echo 7/5:  •  Left Ventricle: Left ventricular cavity size is normal. Wall thickness is normal. The left ventricular ejection fraction is 65%. Systolic function is normal. Diastolic function is normal.  •  The following segments are hypokinetic: apical septal, apical inferior and apex. •  All other segments are normal.  •  Aortic Valve: The aortic valve is possibly bicuspid. The leaflets are not thickened. The leaflets are not calcified. The leaflets exhibit normal mobility. There is mild to moderate stenosis. The aortic valve mean gradient is 17 mmHg. The dimensionless velocity index is 0.41. The aortic valve area is 1.60 cm2.       CTA head and neck with and without contrast   Final Result by SALVADOR Mcneal MD (07/04 1305)      No acute intracranial pathology. No significant stenosis or dissection of the cervical carotid or vertebral arteries. No significant intracranial stenosis, large vessel occlusion or aneurysm. CTA dissection protocol chest/abdomen/pelvis   Final Result by Dariusz Lane MD (07/04 1300)      No evidence of aortic dissection.         Results from last 7 days   Lab Units 07/04/23  1111   SARS-COV-2  Negative     Results from last 7 days   Lab Units 07/04/23  1111   WBC Thousand/uL 9.83   HEMOGLOBIN g/dL 14.4   HEMATOCRIT % 43.0   PLATELETS Thousands/uL 233   NEUTROS ABS Thousands/µL 5.80         Results from last 7 days   Lab Units 07/04/23  1111   SODIUM mmol/L 134*   POTASSIUM mmol/L 4.2   CHLORIDE mmol/L 104   CO2 mmol/L 28   ANION GAP mmol/L 2   BUN mg/dL 9   CREATININE mg/dL 0.97   EGFR ml/min/1.73sq m 70   CALCIUM mg/dL 8.9     Results from last 7 days   Lab Units 07/04/23  1111   AST U/L 34   ALT U/L 31   ALK PHOS U/L 94   TOTAL PROTEIN g/dL 6.7   ALBUMIN g/dL 3.4*   TOTAL BILIRUBIN mg/dL 0.44     Results from last 7 days   Lab Units 07/05/23  1151 07/05/23  0930   POC GLUCOSE mg/dl 157* 60*     Results from last 7 days   Lab Units 07/04/23  1111   GLUCOSE RANDOM mg/dL 241*         Results from last 7 days   Lab Units 07/05/23  0553   HEMOGLOBIN A1C % 9.5*   EAG mg/dl 226     Results from last 7 days   Lab Units 07/04/23  1511 07/04/23  1303 07/04/23  1111   HS TNI 0HR ng/L  --   --  4,100*   HS TNI 2HR ng/L  --  3,550*  --    HSTNI D2 ng/L  --  -550  --    HS TNI 4HR ng/L 2,812*  --   --    HSTNI D4 ng/L -1,288  --   --          Results from last 7 days   Lab Units 07/05/23  1342 07/05/23  0553 07/04/23 2038 07/04/23  1330   PROTIME seconds  --   --   --  14.5   INR   --   --   --  1.11   PTT seconds 65* 57* 32 24     Results from last 7 days   Lab Units 07/04/23  1111   TSH 3RD GENERATON uIU/mL 0.993       Results from last 7 days   Lab Units 07/04/23  1111   INFLUENZA A PCR  Negative   INFLUENZA B PCR  Negative   RSV PCR  Negative         ED Treatment:   Medication Administration from 07/04/2023 1022 to 07/05/2023 0828       Date/Time Order Dose Route Action     07/04/2023 1113 EDT multi-electrolyte (ISOLYTE-S PH 7.4) bolus 1,000 mL 1,000 mL Intravenous New Bag     07/04/2023 1113 EDT metoclopramide (REGLAN) injection 10 mg 10 mg Intravenous Given     07/04/2023 1113 EDT diphenhydrAMINE (BENADRYL) injection 25 mg 25 mg Intravenous Given     07/04/2023 1111 EDT acetaminophen (TYLENOL) tablet 975 mg 975 mg Oral Given     07/04/2023 1221 EDT iodixanol (VISIPAQUE) 320 MG/ML injection 120 mL 120 mL Intravenous Given     07/04/2023 1135 EDT heparin (porcine) injection 4,000 Units 4,000 Units Intravenous Given     07/05/2023 0727 EDT heparin (porcine) 25,000 units in 0.45% NaCl 250 mL infusion (premix) 17.1 Units/kg/hr Intravenous Rate/Dose Change     07/04/2023 2255 EDT heparin (porcine) 25,000 units in 0.45% NaCl 250 mL infusion (premix) 15.1 Units/kg/hr Intravenous Rate/Dose Change     07/04/2023 1341 EDT heparin (porcine) 25,000 units in 0.45% NaCl 250 mL infusion (premix) 11.1 Units/kg/hr Intravenous New Bag     07/04/2023 2254 EDT heparin (porcine) injection 4,000 Units 4,000 Units Intravenous Given     07/05/2023 0726 EDT heparin (porcine) injection 2,000 Units 2,000 Units Intravenous Given     07/04/2023 1804 EDT ALPRAZolam (XANAX) tablet 1 mg 1 mg Oral Given     07/04/2023 1636 EDT atorvastatin (LIPITOR) tablet 40 mg 40 mg Oral Given     07/04/2023 1931 EDT nitroglycerin (NITROSTAT) SL tablet 0.4 mg 0.4 mg Sublingual Given     07/04/2023 1812 EDT nitroglycerin (NITROSTAT) SL tablet 0.4 mg 0.4 mg Sublingual Given     07/04/2023 1635 EDT aspirin (ECOTRIN LOW STRENGTH) EC tablet 324 mg 324 mg Oral Given     Past Medical History:   Diagnosis Date   • Diabetes mellitus ([address])    • Graves disease    • High cholesterol    • Hypertension      Present on Admission:  **None**      Admitting Diagnosis: Neck pain [M54.2]  NSTEMI (non-ST elevated myocardial infarction) ([address]) [I21.4]  Headache [R51.9]  Age/Sex: 57 y.o. female  Admission Orders:  Scheduled Medications:  aspirin, 81 mg, Oral, Daily  atorvastatin, 40 mg, Oral, Daily With Dinner  DULoxetine, 30 mg, Oral, Daily  hydrochlorothiazide, 12.5 mg, Oral, Daily  lisinopril, 10 mg, Oral, Daily  pantoprazole, 40 mg, Oral, Early Morning  [START ON 7/6/2023] ticagrelor, 90 mg, Oral, Q12H [address]    Continuous IV Infusions:  sodium chloride, 100 mL/hr, Intravenous, Continuous    PRN Meds:  acetaminophen, 975 mg, Oral, Q6H PRN  ALPRAZolam, 1 mg, Oral, Daily PRN  dextromethorphan-guaiFENesin, 10 mL, Oral, Q4H PRN  insulin aspart, 120 Units, Subcutaneous Insulin Pump, Daily PRN  nitroglycerin, 0.4 mg, Sublingual, Q5 Min PRN  ondansetron, 4 mg, Intravenous, Q4H PRN        Network Utilization Review Department  ATTENTION: Please call with any questions or concerns to [phone] and carefully listen to the prompts so that you are directed to the right person. All voicemails are confidential.  Anges Donaldson all requests for admission clinical reviews, approved or denied determinations and any other requests to dedicated fax number below belonging to the campus where the patient is receiving treatment.  List of dedicated fax numbers for the Facilities:  [place] DENIALS (Administrative/Medical Necessity) [phone] [facility] [facility] [phone]   [address] [place] [address] [phone]   [facility] [address] [facility] [phone] [address] [phone]   [address] [facility] 1300 [facility] [address] [place] [address] [phone]

## 2023-07-05 NOTE — ASSESSMENT & PLAN NOTE
Patient reported to the ED staff of spending a lot of time outdoors last several days and removing several ticks off her body  Lyme disease work-up negative

## 2023-07-06 VITALS
TEMPERATURE: 98.8 F | RESPIRATION RATE: 16 BRPM | SYSTOLIC BLOOD PRESSURE: 121 MMHG | BODY MASS INDEX: 41.32 KG/M2 | HEIGHT: 64 IN | OXYGEN SATURATION: 95 % | WEIGHT: 242 LBS | DIASTOLIC BLOOD PRESSURE: 66 MMHG | HEART RATE: 72 BPM

## 2023-07-06 LAB
ANION GAP SERPL CALCULATED.3IONS-SCNC: 3 MMOL/L
ATRIAL RATE: 76 BPM
ATRIAL RATE: 82 BPM
ATRIAL RATE: 94 BPM
ATRIAL RATE: 99 BPM
BUN SERPL-MCNC: 11 MG/DL (ref 5–25)
CALCIUM SERPL-MCNC: 8.7 MG/DL (ref 8.3–10.1)
CHLORIDE SERPL-SCNC: 105 MMOL/L (ref 96–108)
CO2 SERPL-SCNC: 27 MMOL/L (ref 21–32)
CREAT SERPL-MCNC: 0.99 MG/DL (ref 0.6–1.3)
ERYTHROCYTE [DISTWIDTH] IN BLOOD BY AUTOMATED COUNT: 12.1 % (ref 11.6–15.1)
GFR SERPL CREATININE-BSD FRML MDRD: 68 ML/MIN/1.73SQ M
GLUCOSE SERPL-MCNC: 240 MG/DL (ref 65–140)
GLUCOSE SERPL-MCNC: 53 MG/DL (ref 65–140)
GLUCOSE SERPL-MCNC: 78 MG/DL (ref 65–140)
GLUCOSE SERPL-MCNC: 86 MG/DL (ref 65–140)
HCT VFR BLD AUTO: 43.2 % (ref 34.8–46.1)
HGB BLD-MCNC: 14.6 G/DL (ref 11.5–15.4)
MCH RBC QN AUTO: 29.8 PG (ref 26.8–34.3)
MCHC RBC AUTO-ENTMCNC: 33.8 G/DL (ref 31.4–37.4)
MCV RBC AUTO: 88 FL (ref 82–98)
P AXIS: 17 DEGREES
P AXIS: 32 DEGREES
P AXIS: 74 DEGREES
P AXIS: 82 DEGREES
PLATELET # BLD AUTO: 242 THOUSANDS/UL (ref 149–390)
PMV BLD AUTO: 10 FL (ref 8.9–12.7)
POTASSIUM SERPL-SCNC: 3.8 MMOL/L (ref 3.5–5.3)
PR INTERVAL: 124 MS
PR INTERVAL: 128 MS
PR INTERVAL: 130 MS
PR INTERVAL: 136 MS
QRS AXIS: 10 DEGREES
QRS AXIS: 23 DEGREES
QRS AXIS: 28 DEGREES
QRS AXIS: 46 DEGREES
QRSD INTERVAL: 60 MS
QRSD INTERVAL: 62 MS
QRSD INTERVAL: 66 MS
QRSD INTERVAL: 70 MS
QT INTERVAL: 316 MS
QT INTERVAL: 322 MS
QT INTERVAL: 346 MS
QT INTERVAL: 360 MS
QTC INTERVAL: 402 MS
QTC INTERVAL: 404 MS
QTC INTERVAL: 405 MS
QTC INTERVAL: 405 MS
RBC # BLD AUTO: 4.9 MILLION/UL (ref 3.81–5.12)
SODIUM SERPL-SCNC: 135 MMOL/L (ref 135–147)
T WAVE AXIS: 108 DEGREES
T WAVE AXIS: 108 DEGREES
T WAVE AXIS: 112 DEGREES
T WAVE AXIS: 115 DEGREES
VENTRICULAR RATE: 76 BPM
VENTRICULAR RATE: 82 BPM
VENTRICULAR RATE: 94 BPM
VENTRICULAR RATE: 99 BPM
WBC # BLD AUTO: 10.13 THOUSAND/UL (ref 4.31–10.16)

## 2023-07-06 PROCEDURE — 82948 REAGENT STRIP/BLOOD GLUCOSE: CPT

## 2023-07-06 PROCEDURE — 99232 SBSQ HOSP IP/OBS MODERATE 35: CPT | Performed by: INTERNAL MEDICINE

## 2023-07-06 PROCEDURE — 99239 HOSP IP/OBS DSCHRG MGMT >30: CPT | Performed by: PHYSICIAN ASSISTANT

## 2023-07-06 PROCEDURE — 80048 BASIC METABOLIC PNL TOTAL CA: CPT | Performed by: FAMILY MEDICINE

## 2023-07-06 PROCEDURE — 93010 ELECTROCARDIOGRAM REPORT: CPT | Performed by: INTERNAL MEDICINE

## 2023-07-06 PROCEDURE — 85027 COMPLETE CBC AUTOMATED: CPT | Performed by: FAMILY MEDICINE

## 2023-07-06 RX ORDER — LISINOPRIL AND HYDROCHLOROTHIAZIDE 12.5; 1 MG/1; MG/1
1 TABLET ORAL DAILY
COMMUNITY
End: 2023-07-12 | Stop reason: SDUPTHER

## 2023-07-06 RX ORDER — KETOROLAC TROMETHAMINE 30 MG/ML
15 INJECTION, SOLUTION INTRAMUSCULAR; INTRAVENOUS ONCE
Status: COMPLETED | OUTPATIENT
Start: 2023-07-06 | End: 2023-07-06

## 2023-07-06 RX ORDER — ENOXAPARIN SODIUM 100 MG/ML
40 INJECTION SUBCUTANEOUS EVERY 12 HOURS SCHEDULED
Status: DISCONTINUED | OUTPATIENT
Start: 2023-07-06 | End: 2023-07-06 | Stop reason: HOSPADM

## 2023-07-06 RX ORDER — DIPHENHYDRAMINE HYDROCHLORIDE 50 MG/ML
12.5 INJECTION INTRAMUSCULAR; INTRAVENOUS ONCE
Status: COMPLETED | OUTPATIENT
Start: 2023-07-06 | End: 2023-07-06

## 2023-07-06 RX ORDER — METOCLOPRAMIDE HYDROCHLORIDE 5 MG/ML
10 INJECTION INTRAMUSCULAR; INTRAVENOUS ONCE
Status: COMPLETED | OUTPATIENT
Start: 2023-07-06 | End: 2023-07-06

## 2023-07-06 RX ADMIN — HYDROCHLOROTHIAZIDE 12.5 MG: 12.5 TABLET ORAL at 09:08

## 2023-07-06 RX ADMIN — KETOROLAC TROMETHAMINE 15 MG: 30 INJECTION, SOLUTION INTRAMUSCULAR; INTRAVENOUS at 00:16

## 2023-07-06 RX ADMIN — DULOXETINE 30 MG: 30 CAPSULE, DELAYED RELEASE ORAL at 09:08

## 2023-07-06 RX ADMIN — ACETAMINOPHEN 975 MG: 325 TABLET ORAL at 12:35

## 2023-07-06 RX ADMIN — DIPHENHYDRAMINE HYDROCHLORIDE 12.5 MG: 50 INJECTION, SOLUTION INTRAMUSCULAR; INTRAVENOUS at 00:17

## 2023-07-06 RX ADMIN — INSULIN ASPART 3 UNITS: 100 INJECTION, SOLUTION INTRAVENOUS; SUBCUTANEOUS at 00:23

## 2023-07-06 RX ADMIN — ASPIRIN 81 MG: 81 TABLET, COATED ORAL at 09:08

## 2023-07-06 RX ADMIN — LISINOPRIL 10 MG: 10 TABLET ORAL at 09:08

## 2023-07-06 RX ADMIN — PANTOPRAZOLE SODIUM 40 MG: 40 TABLET, DELAYED RELEASE ORAL at 05:40

## 2023-07-06 RX ADMIN — INSULIN ASPART 9 UNITS: 100 INJECTION, SOLUTION INTRAVENOUS; SUBCUTANEOUS at 12:36

## 2023-07-06 RX ADMIN — METOCLOPRAMIDE 10 MG: 5 INJECTION, SOLUTION INTRAMUSCULAR; INTRAVENOUS at 00:15

## 2023-07-06 RX ADMIN — TICAGRELOR 90 MG: 90 TABLET ORAL at 05:40

## 2023-07-06 NOTE — PLAN OF CARE
Problem: PAIN - ADULT  Goal: Verbalizes/displays adequate comfort level or baseline comfort level  Description: Interventions:  - Encourage patient to monitor pain and request assistance  - Assess pain using appropriate pain scale  - Administer analgesics based on type and severity of pain and evaluate response  - Implement non-pharmacological measures as appropriate and evaluate response  - Consider cultural and social influences on pain and pain management  - Notify physician/advanced practitioner if interventions unsuccessful or patient reports new pain  Outcome: Progressing     Problem: INFECTION - ADULT  Goal: Absence or prevention of progression during hospitalization  Description: INTERVENTIONS:  - Assess and monitor for signs and symptoms of infection  - Monitor lab/diagnostic results  - Monitor all insertion sites, i.e. indwelling lines, tubes, and drains  - Monitor endotracheal if appropriate and nasal secretions for changes in amount and color  - [place] appropriate cooling/warming therapies per order  - Administer medications as ordered  - Instruct and encourage patient and family to use good hand hygiene technique  - Identify and instruct in appropriate isolation precautions for identified infection/condition  Outcome: Progressing  Goal: Absence of fever/infection during neutropenic period  Description: INTERVENTIONS:  - Monitor WBC    Outcome: Progressing     Problem: SAFETY ADULT  Goal: Patient will remain free of falls  Description: INTERVENTIONS:  - Educate patient/family on patient safety including physical limitations  - Instruct patient to call for assistance with activity   - Consult OT/PT to assist with strengthening/mobility   - Keep Call bell within reach  - Keep bed low and locked with side rails adjusted as appropriate  - Keep care items and personal belongings within reach  - Initiate and maintain comfort rounds  - Make Fall Risk Sign visible to staff  - Offer Toileting every  Hours, in advance of need  - Initiate/Maintain alarm  - Obtain necessary fall risk management equipment:   - Apply yellow socks and bracelet for high fall risk patients  - Consider moving patient to room near nurses station  Outcome: Progressing  Goal: Maintain or return to baseline ADL function  Description: INTERVENTIONS:  -  Assess patient's ability to carry out ADLs; assess patient's baseline for ADL function and identify physical deficits which impact ability to perform ADLs (bathing, care of mouth/teeth, toileting, grooming, dressing, etc.)  - Assess/evaluate cause of self-care deficits   - Assess range of motion  - Assess patient's mobility; develop plan if impaired  - Assess patient's need for assistive devices and provide as appropriate  - Encourage maximum independence but intervene and supervise when necessary  - Involve family in performance of ADLs  - Assess for home care needs following discharge   - Consider OT consult to assist with ADL evaluation and planning for discharge  - Provide patient education as appropriate  Outcome: Progressing  Goal: Maintains/Returns to pre admission functional level  Description: INTERVENTIONS:  - Perform BMAT or MOVE assessment daily.   - Set and communicate daily mobility goal to care team and patient/family/caregiver. - Collaborate with rehabilitation services on mobility goals if consulted  - Perform Range of Motion  times a day. - Reposition patient every  hours.   - Dangle patient  times a day  - Stand patient  times a day  - Ambulate patient  times a day  - Out of bed to chair  times a day   - Out of bed for meals  times a day  - Out of bed for toileting  - Record patient progress and toleration of activity level   Outcome: Progressing     Problem: DISCHARGE PLANNING  Goal: Discharge to home or other facility with appropriate resources  Description: INTERVENTIONS:  - Identify barriers to discharge w/patient and caregiver  - Arrange for needed discharge resources and transportation as appropriate  - Identify discharge learning needs (meds, wound care, etc.)  - Arrange for interpretive services to assist at discharge as needed  - Refer to Case Management Department for coordinating discharge planning if the patient needs post-hospital services based on physician/advanced practitioner order or complex needs related to functional status, cognitive ability, or social support system  Outcome: Progressing     Problem: Knowledge Deficit  Goal: Patient/family/caregiver demonstrates understanding of disease process, treatment plan, medications, and discharge instructions  Description: Complete learning assessment and assess knowledge base.   Interventions:  - Provide teaching at level of understanding  - Provide teaching via preferred learning methods  Outcome: Progressing

## 2023-07-06 NOTE — ASSESSMENT & PLAN NOTE
· Pt presented with chest discomfort with radiation to the right shoulder/arm and jaw with associated headache/neck pain  · Atypical presentation for ACS noted, however significantly elevated troponins, peak of > 4,000  · CT angiogram of head/neck negative for acute intracranial etiology or intravascular issues - CT angiogram of chest/abdomen/pelvis negative for aortic dissection  · Cardiology following,  · ECHO with EF 65%, mild to moderate aortic stenosis  · Cardiac cath 7/5 with mid LAD lesion, stent placed  · Continue DAPT with ASA, brillinta x 1 year   · Cardiac rehab on discharge   · Continue statin, low dose BB   · Stable for discharge from cardiac standpoint with close follow up

## 2023-07-06 NOTE — PROGRESS NOTES
Cardiology Team 2 Progress Note - Wesley Cat 57 y.o. female MRN: [id number]    Unit/Bed#: -01 Encounter: [id number]          Subjective:   Patient seen and examined. No significant events overnight. Patient reports feeling well s/p her catheterization and PCI yesterday. She currently denies any lightheadedness, dizziness, syncope, vision changes, diaphoresis, chest pain, palpitations, shortness of breath, PND, orthopnea, nausea, vomiting, abdominal pain or lower extremity edema. She does endorse intermittent headache for which she received a migraine cocktail overnight; otherwise, she endorses no other complaints. Hospital Course:   Wesley Cat is a 57y.o. year old female with a history of T1DM, HTN, hyperlipidemia, and morbid obesity who initially presented to the ED for evaluation of chest/neck pain, headache, and RA paresthesias. She was subsequently found to have ST depressions on EKG; serial troponin ranged from roughly 2,000-4,000. She was aspirin-loaded and, given her symptoms and presentation, was taken for left heart cath yesterday (7/5/23). This revealed a 95% stenosed mid-LAD lesion that was treated with KSENIA placement; she is now post-procedure day 1. Vitals: Blood pressure 113/67, pulse 80, temperature 98.1 °F (36.7 °C), resp. rate 16, height 5' 4" (1.626 m), weight 110 kg (242 lb), SpO2 93 %. , Body mass index is 41.54 kg/m².,   Orthostatic Blood Pressures    Flowsheet Row Most Recent Value   Blood Pressure 113/67 filed at 07/06/2023 0745   Patient Position - Orthostatic VS Lying filed at 07/05/2023 0847            Intake/Output Summary (Last 24 hours) at 7/6/2023 0806  Last data filed at 7/6/2023 0501  Gross per 24 hour   Intake 240 ml   Output 1000 ml   Net -760 ml         Physical Exam:    GEN: Wesley Cat appears well, alert and oriented x 3, pleasant and cooperative   HEENT:  Normocephalic, atraumatic, anicteric, moist mucous membranes  NECK: No JVD or carotid bruits   HEART: Regular rhythm, normal rate, normal S1 and S2, no murmurs, clicks, gallops or rubs   LUNGS: Clear to auscultation bilaterally; no wheezes, rales, or rhonchi; respiration nonlabored   ABDOMEN:  Normoactive bowel sounds, soft, no tenderness, no distention  EXTREMITIES: peripheral pulses palpable; no edema; radial access site clean and intact  NEURO: no gross focal findings; cranial nerves grossly intact   SKIN:  Dry, intact, warm to touch      Current Facility-Administered Medications:   •  acetaminophen (TYLENOL) tablet 975 mg, 975 mg, Oral, Q6H PRN, Linda Murphy MD, 975 mg at 07/05/23 2350  •  ALPRAZolam (XANAX) tablet 1 mg, 1 mg, Oral, Daily PRN, Carmenza An MD, 1 mg at 07/04/23 1804  •  aspirin (ECOTRIN LOW STRENGTH) EC tablet 81 mg, 81 mg, Oral, Daily, Andrez Peña DO, 81 mg at 07/05/23 6355  •  atorvastatin (LIPITOR) tablet 40 mg, 40 mg, Oral, Daily With Jarod Christian MD, 40 mg at 07/05/23 1646  •  dextromethorphan-guaiFENesin (ROBITUSSIN DM) oral syrup 10 mL, 10 mL, Oral, Q4H PRN, Carmenza An MD  •  DULoxetine (CYMBALTA) delayed release capsule 30 mg, 30 mg, Oral, Daily, Carmenza An MD, 30 mg at 07/05/23 1342  •  hydrochlorothiazide (HYDRODIURIL) tablet 12.5 mg, 12.5 mg, Oral, Daily, Carmenza An MD, 12.5 mg at 07/05/23 0858  •  insulin aspart (NovoLOG) FOR PUMP REFILLS 120 Units, 120 Units, Subcutaneous Insulin Pump, Daily PRN, Carmenza An MD, 3 Units at 07/06/23 0023  •  lisinopril (ZESTRIL) tablet 10 mg, 10 mg, Oral, Daily, Carmenza An MD, 10 mg at 07/05/23 0858  •  nitroglycerin (NITROSTAT) SL tablet 0.4 mg, 0.4 mg, Sublingual, Q5 Min PRN, Carmenza An MD, 0.4 mg at 07/04/23 1931  •  ondansetron (ZOFRAN) injection 4 mg, 4 mg, Intravenous, Q4H PRN, Carmenza An MD  •  pantoprazole (PROTONIX) EC tablet 40 mg, 40 mg, Oral, Early Morning, Carmenza An MD, 40 mg at 07/06/23 0540  •  ticagrelor (BRILINTA) tablet 90 mg, 90 mg, Oral, Q12H [address], Jr Nevarez DO, 90 mg at 07/06/23 0540    Labs & Results:          Results from last 7 days   Lab Units 07/06/23  0604 07/04/23  1111   POTASSIUM mmol/L 3.8 4.2   CO2 mmol/L 27 28   CHLORIDE mmol/L 105 104   BUN mg/dL 11 9   CREATININE mg/dL 0.99 0.97     Results from last 7 days   Lab Units 07/06/23  0604 07/04/23  1111   HEMOGLOBIN g/dL 14.6 14.4   HEMATOCRIT % 43.2 43.0   PLATELETS Thousands/uL 242 233     Results from last 7 days   Lab Units 07/05/23  0553   TRIGLYCERIDES mg/dL 73   HDL mg/dL 57   LDL CALC mg/dL 33   HEMOGLOBIN A1C % 9.5*       Telemetry:   Personally reviewed by Sindhu Hodgson MD: no significant events    Imaging: ECHO (7/5/23) - EF 17%, normal systolic/diastolic function, hypokinetic apical septal, apical inferior, and apex segments; possibly bicuspid aortic valve      VTE Prophylaxis: Previously was on heparin drip prior to PCI; none ordered as of yet; will discuss with primary team      Assessment:  Principal Problem:    NSTEMI (non-ST elevated myocardial infarction)  Active Problems:    Insulin dependent type 1 diabetes mellitus    Essential hypertension    Fibromyalgia    Morbid obesity    Hyperlipidemia    Anxiety    Tick bites/exposure      Plan:  · Continue treatment with ASA/Brilinta  · Continue home lisinopril, atorvastatin   · Would also consider adding Lopressor 12.5 mg BID to round out GDMT  · Rest of care as per primary team      Case discussed and reviewed with Dr. Gemma Bustillo and is pending their agreement with the assessment and plan. Thank you for involving us in the care of your patient. Sindhu Hodgson MD  PGY-1, Internal Medicine      [facility]/ [employer]/Care Everywhere records reviewed: yes    ** Please Note: Fluency DirectDictation voice to text software may have been used in the creation of this document.  **

## 2023-07-06 NOTE — ASSESSMENT & PLAN NOTE
· Patient reported to the ED staff of spending a lot of time outdoors last several days and removing several ticks off her body  · Lyme disease work-up negative

## 2023-07-06 NOTE — DISCHARGE INSTR - AVS FIRST PAGE
Please follow up with your primary care provider within one week   Please follow up with cardiology and continue aspirin, brilinta therapy for at least 12 months   Continue with cardiac rehab on discharge  If you have any worsening chest pain, shortness of breath, lightheadedness, headaches please come back to the hospital for further evaluation

## 2023-07-06 NOTE — PLAN OF CARE
Problem: PAIN - ADULT  Goal: Verbalizes/displays adequate comfort level or baseline comfort level  Description: Interventions:  - Encourage patient to monitor pain and request assistance  - Assess pain using appropriate pain scale  - Administer analgesics based on type and severity of pain and evaluate response  - Implement non-pharmacological measures as appropriate and evaluate response  - Consider cultural and social influences on pain and pain management  - Notify physician/advanced practitioner if interventions unsuccessful or patient reports new pain  7/6/2023 1837 by Stacey Valentin RN  Outcome: Adequate for Discharge  7/6/2023 0941 by Stacey Valentin RN  Outcome: Progressing     Problem: INFECTION - ADULT  Goal: Absence or prevention of progression during hospitalization  Description: INTERVENTIONS:  - Assess and monitor for signs and symptoms of infection  - Monitor lab/diagnostic results  - Monitor all insertion sites, i.e. indwelling lines, tubes, and drains  - Monitor endotracheal if appropriate and nasal secretions for changes in amount and color  - Dayhoit appropriate cooling/warming therapies per order  - Administer medications as ordered  - Instruct and encourage patient and family to use good hand hygiene technique  - Identify and instruct in appropriate isolation precautions for identified infection/condition  7/6/2023 1837 by Stacey Valentin RN  Outcome: Adequate for Discharge  7/6/2023 0941 by Stacey Valentin RN  Outcome: Progressing  Goal: Absence of fever/infection during neutropenic period  Description: INTERVENTIONS:  - Monitor WBC    7/6/2023 1837 by Stacey Valentin RN  Outcome: Adequate for Discharge  7/6/2023 0941 by Stacey Valentin RN  Outcome: Progressing     Problem: SAFETY ADULT  Goal: Patient will remain free of falls  Description: INTERVENTIONS:  - Educate patient/family on patient safety including physical limitations  - Instruct patient to call for assistance with activity   - Consult OT/PT to assist with strengthening/mobility   - Keep Call bell within reach  - Keep bed low and locked with side rails adjusted as appropriate  - Keep care items and personal belongings within reach  - Initiate and maintain comfort rounds  - Make Fall Risk Sign visible to staff  - Offer Toileting every  Hours, in advance of need  - Initiate/Maintain alarm  - Obtain necessary fall risk management equipment:   - Apply yellow socks and bracelet for high fall risk patients  - Consider moving patient to room near nurses station  7/6/2023 1837 by Lennox Blanc, RN  Outcome: Adequate for Discharge  7/6/2023 0941 by Lennox Blanc, RN  Outcome: Progressing  Goal: Maintain or return to baseline ADL function  Description: INTERVENTIONS:  -  Assess patient's ability to carry out ADLs; assess patient's baseline for ADL function and identify physical deficits which impact ability to perform ADLs (bathing, care of mouth/teeth, toileting, grooming, dressing, etc.)  - Assess/evaluate cause of self-care deficits   - Assess range of motion  - Assess patient's mobility; develop plan if impaired  - Assess patient's need for assistive devices and provide as appropriate  - Encourage maximum independence but intervene and supervise when necessary  - Involve family in performance of ADLs  - Assess for home care needs following discharge   - Consider OT consult to assist with ADL evaluation and planning for discharge  - Provide patient education as appropriate  7/6/2023 1837 by Lennox Blanc, RN  Outcome: Adequate for Discharge  7/6/2023 0941 by Lennox Blanc, RN  Outcome: Progressing  Goal: Maintains/Returns to pre admission functional level  Description: INTERVENTIONS:  - Perform BMAT or MOVE assessment daily.   - Set and communicate daily mobility goal to care team and patient/family/caregiver. - Collaborate with rehabilitation services on mobility goals if consulted  - Perform Range of Motion  times a day.   - Reposition patient every  hours. - Dangle patient  times a day  - Stand patient  times a day  - Ambulate patient  times a day  - Out of bed to chair  times a day   - Out of bed for meals times a day  - Out of bed for toileting  - Record patient progress and toleration of activity level   7/6/2023 1837 by Ace Santizo RN  Outcome: Adequate for Discharge  7/6/2023 0941 by Ace Santizo RN  Outcome: Progressing     Problem: DISCHARGE PLANNING  Goal: Discharge to home or other facility with appropriate resources  Description: INTERVENTIONS:  - Identify barriers to discharge w/patient and caregiver  - Arrange for needed discharge resources and transportation as appropriate  - Identify discharge learning needs (meds, wound care, etc.)  - Arrange for interpretive services to assist at discharge as needed  - Refer to Case Management Department for coordinating discharge planning if the patient needs post-hospital services based on physician/advanced practitioner order or complex needs related to functional status, cognitive ability, or social support system  7/6/2023 1837 by Ace Santizo RN  Outcome: Adequate for Discharge  7/6/2023 0941 by Ace Santizo RN  Outcome: Progressing     Problem: Knowledge Deficit  Goal: Patient/family/caregiver demonstrates understanding of disease process, treatment plan, medications, and discharge instructions  Description: Complete learning assessment and assess knowledge base.   Interventions:  - Provide teaching at level of understanding  - Provide teaching via preferred learning methods  7/6/2023 1837 by Ace Santizo RN  Outcome: Adequate for Discharge  7/6/2023 0941 by Ace Santizo RN  Outcome: Progressing

## 2023-07-06 NOTE — ASSESSMENT & PLAN NOTE
Lab Results   Component Value Date    HGBA1C 9.5 (H) 07/05/2023     · Has been maintained on an insulin pump for nearly 30 years per patient recollection -> currently refusing to transition to a basal/prandial insulin with sliding scale coverage while inpatient   · Continue with insulin pump outpatient   · Follow up with endocrinology outpatient

## 2023-07-06 NOTE — DISCHARGE SUMMARY
[facility]  Discharge- Eri Mills 1975, 57 y.o. female MRN: [id number]  Unit/Bed#: -01 Encounter: [id number]  Primary Care Provider: Klarissa Geiger MD   Date and time admitted to hospital: 7/4/2023 10:30 AM      DOS: 7/6/2023  ADDENDUM: Pt noted bruising on the dorsal aspect of the right foot, no trauma that patient can remember. No real tenderness to palpation, only mild, full ROM, no tenderness with ambulation. Pt was on a heparin gtt until yesterday, could be from blood thinners/anti platelets here. Due to no tenderness or trauma noted and no ambulatory difficulties would monitor at home. Discussed if any tenderness or worsening swelling/bruising at home should follow up with PCP/return to hospital for further work up. Pt and  in agreement.    * NSTEMI (non-ST elevated myocardial infarction)  Assessment & Plan  · Pt presented with chest discomfort with radiation to the right shoulder/arm and jaw with associated headache/neck pain  · Atypical presentation for ACS noted, however significantly elevated troponins, peak of > 4,000  · CT angiogram of head/neck negative for acute intracranial etiology or intravascular issues - CT angiogram of chest/abdomen/pelvis negative for aortic dissection  · Cardiology following,  · ECHO with EF 65%, mild to moderate aortic stenosis  · Cardiac cath 7/5 with mid LAD lesion, stent placed  · Continue DAPT with ASA, brillinta x 1 year   · Cardiac rehab on discharge   · Continue statin, low dose BB   · Stable for discharge from cardiac standpoint with close follow up     Insulin dependent type 1 diabetes mellitus  Assessment & Plan  Lab Results   Component Value Date    HGBA1C 9.5 (H) 07/05/2023     · Has been maintained on an insulin pump for nearly 30 years per patient recollection -> currently refusing to transition to a basal/prandial insulin with sliding scale coverage while inpatient   · Continue with insulin pump outpatient   · Follow up with endocrinology outpatient     Tick bites/exposure  Assessment & Plan  · Patient reported to the ED staff of spending a lot of time outdoors last several days and removing several ticks off her body  · Lyme disease work-up negative    Anxiety  Assessment & Plan  · C/w home PRN Xanax regimen  · Mood stable on review    Hyperlipidemia  Assessment & Plan  · Continue Lipitor 40 mg daily   · Updated lipid panel WNL    Morbid obesity  Assessment & Plan  · Lifestyle/diet modifications once medically stable  · BMI 41.54    Fibromyalgia  Assessment & Plan  · Continue Cymbalta 30 mg daily   · Appears stable     Essential hypertension  Assessment & Plan  · BP stable on review   · Low-sodium diet  · Continue HCTZ 12.5 mg daily, lisinopril 10 mg daily and Lopressor 12.5 mg BID  · Monitor outpatient with PCP/cardiology     Medical Problems     Resolved Problems  Date Reviewed: 7/6/2023   None       Discharging Physician / Practitioner: Elaine Wallace PA-C  PCP: Namrata Billingsley MD  Admission Date:   Admission Orders (From admission, onward)     Ordered        07/04/23 1338  [address]  Once                      Discharge Date: 07/06/23    Consultations During Hospital Stay:  · Cardiology     Procedures Performed:   · CTA head and neck 7/4 - No acute intracranial pathology. No significant stenosis or dissection of the cervical carotid or vertebral arteries. No significant intracranial stenosis, large vessel occlusion or aneurysm   · CTA dissection protocol 7/4 - No evidence of aortic dissection   · ECHO 7/5 - EF 65%, hypokinesis of apical, septal, apical inferior and apex. Mild to moderate aortic stenosis   · Cardiac cath 7/5 - Mid LAD lesion is 95% stenosed. S/p PCI to mid LAD with KSENIA x 1. Mild residual CAD amendable to GDMT.     Significant Findings / Test Results:   · Elevated troponins   · Pregnancy test negative   · COVID/flu/RSV negative   · Lyme panel negative     Incidental Findings: · None   · N/A    Test Results Pending at Discharge (will require follow up): · None     Outpatient Tests Requested:  · Per PCP/cardiology     Complications:  None    Reason for Admission: Right sided jaw/shoulder pain with headache/stiffness     Hospital Course:   Tanvi Lo is a 57 y.o. female patient with significant past medical history of DM, HTN, fibromyalgia, morbid obesity, HLD, anxiety who originally presented to the hospital on 7/4/2023 due to right sided jaw/shoulder pain with headaches. Pt had reported episodes waxing and waning over 3-4 days. Was noted to have significantly elevated troponins on admission and was placed on a heparin gtt and seen by cardiology. She underwent an ECHO and cardiac cath which revealed mid LAD lesion that was treated with KSENIA x 1. Her symptoms significantly improved prior to discharge. She was placed on DAPT, BB and statin and cleared for discharge from cardiac standpoint. She was discharged in stable condition. For additional information please refer to medical records. Medication changes include: Addition of ASA 81 mg daily, Lopressor 12.5 mg BID, Brilinta 90 mg BID       Please see above list of diagnoses and related plan for additional information. Condition at Discharge: stable    Discharge Day Visit / Exam:   Subjective:  Pt reports that her headache is now much improved. Denies any chest pain, shortness of breath, abdominal pain, nausea or vomiting. Has been eating and drinking well here. Has not had a BM in a few days. No other complaints at this time. We did discuss her ECHO and cath findings, as well as medication changes and follow up with cardiology and cardiac rehab.    Vitals: Blood Pressure: 121/66 (07/06/23 1505)  Pulse: 72 (07/06/23 1505)  Temperature: 98.8 °F (37.1 °C) (07/06/23 1505)  Temp Source: Oral (07/06/23 1505)  Respirations: 16 (07/06/23 1505)  Height: 5' 4" (162.6 cm) (07/05/23 1134)  Weight - Scale: 110 kg (242 lb) (07/05/23 1134)  SpO2: 95 % (07/06/23 1505)  Exam:   Physical Exam  Vitals reviewed. Constitutional:       General: She is not in acute distress. Appearance: She is not toxic-appearing. Comments: Pt is in no acute distress lying in her hospital bed resting comfortably. HENT:      Head: Normocephalic and atraumatic. Cardiovascular:      Rate and Rhythm: Normal rate and regular rhythm. Pulmonary:      Effort: No respiratory distress. Breath sounds: Normal breath sounds. No wheezing. Abdominal:      General: There is no distension. Palpations: Abdomen is soft. Tenderness: There is no abdominal tenderness. Musculoskeletal:      Right lower leg: No edema. Left lower leg: No edema. Skin:     General: Skin is warm and dry. Findings: No erythema. Neurological:      Mental Status: She is alert. Psychiatric:         Mood and Affect: Mood normal.          Discussion with Family: Patient declined call to . Discharge instructions/Information to patient and family:   See after visit summary for information provided to patient and family. Provisions for Follow-Up Care:  See after visit summary for information related to follow-up care and any pertinent home health orders. Disposition:   Home    Planned Readmission: None     Discharge Statement:  I spent 45 minutes discharging the patient. This time was spent on the day of discharge. I had direct contact with the patient on the day of discharge. Greater than 50% of the total time was spent examining patient, answering all patient questions, arranging and discussing plan of care with patient as well as directly providing post-discharge instructions. Additional time then spent on discharge activities. Discharge Medications:  See after visit summary for reconciled discharge medications provided to patient and/or family.       **Please Note: This note may have been constructed using a voice recognition system**

## 2023-07-06 NOTE — ASSESSMENT & PLAN NOTE
· BP stable on review   · Low-sodium diet  · Continue HCTZ 12.5 mg daily, lisinopril 10 mg daily and Lopressor 12.5 mg BID  · Monitor outpatient with PCP/cardiology

## 2023-07-07 NOTE — UTILIZATION REVIEW
NOTIFICATION OF ADMISSION DISCHARGE   This is a Notification of Discharge from [address] [place] [address]. Please be advised that this patient has been discharge from our facility. Below you will find the admission and discharge date and time including the patient’s disposition. UTILIZATION REVIEW CONTACT:  Niko Siddiqi  Utilization   Network Utilization Review Department  Phone: [phone] x carefully listen to the prompts. All voicemails are confidential.  Email: [email or web address]     ADMISSION INFORMATION  PRESENTATION DATE: 7/4/2023 10:30 AM  OBERVATION ADMISSION DATE:   INPATIENT ADMISSION DATE: 7/4/23  1:38 PM   DISCHARGE DATE: 7/6/2023  6:39 PM   DISPOSITION:Home/Self Care    IMPORTANT INFORMATION:  Send all requests for admission clinical reviews, approved or denied determinations and any other requests to dedicated fax number below belonging to the campus where the patient is receiving treatment.  List of dedicated fax numbers:  [place] DENIALS (Administrative/Medical Necessity) [phone] [address] [facility] (Maternity/NICU/Pediatrics) [phone]   [facility] [phone]   [facility] [phone] [facility] [phone]   [address] [place] [address] [phone]   [facility] [phone]   [address] [place] [address] [address] [place] [address] [phone]   [address] [place] [address] [phone] [address] [facility] [phone] [address] [facility] [address] [facility] [phone]

## 2023-07-09 LAB — B BURGDOR DNA SPEC QL NAA+PROBE: NEGATIVE

## 2023-07-10 NOTE — PROGRESS NOTES
Heart Failure Outpatient Progress Note - Loltia Barrios 57 y.o. female MRN: [id number]    @ Encounter: [id number]      Assessment/Plan:    Patient Active Problem List    Diagnosis Date Noted   • NSTEMI (non-ST elevated myocardial infarction) 07/04/2023   • Insulin dependent type 1 diabetes mellitus 07/04/2023   • Essential hypertension 07/04/2023   • Fibromyalgia 07/04/2023   • Morbid obesity 07/04/2023   • Hyperlipidemia 07/04/2023   • Anxiety 07/04/2023   • Tick bites/exposure 07/04/2023       CAD. S/P NSTEMI with PCI/KSENIA to the LAD. Rx: ASA, Brilinta, Metoprolol,  statin. No anginal episodes since discharge. To start cardiac rehab tomorrow. Rx given for SL NTG PRN. Reinforced importance of lifestyle/risk factor management. --[facility] 7/5/23: Mid LAD lesion is 95% stenosed. Culprit of NSTEMI. S/P successful IVUS-guided PCI to the mid LAD with KSENIA x 1. Mild reisdual CAD amenable to GDMT. LVEDP normal without gradient on LV-AO pullback    --TTE 7/5/23: LVEF 75%, normal diastolic function, RV normal, atrial normal, mild to mod AS, normal MV, trace TR, IVC normal,     Type I DM. A1C 9.5 on 7/5/23  HTN. Lisinopril with HCTZ 10/12.5 mg daily, Metoprolol tartrate 12.5 mg BID,  HLD. Controlled. See recent FLP below. Continue Atorvastatin 40 mg daily  Morbid obesity. BMI 41. Discussed weight loss  Recent tick bite. Lyme Ab negative on 7/5. Will need to screen regularly given frequent exposures  Anxiety  Fibromyalgia  ANNE-MARIE. Adherent with CPAP at HS.        HPI:   Lolita Barrios is a 57y.o. year old female with a history of T1DM, HTN, hyperlipidemia, and morbid obesity who initially presented to the ED for evaluation of chest/neck pain, headache, and RA paresthesias. She was subsequently found to have ST depressions on EKG; serial troponin ranged from roughly 2,000-4,000. She was aspirin-loaded and, given her symptoms and presentation, was taken for left heart cath yesterday (7/5/23).  This revealed a 95% stenosed mid-LAD lesion that was treated with KSENIA placement. 7/11/23. Presents for hospital follow up with her family. Doing well post discharge with exception of fatigue. She is taking all medications faithfully. Made some changes in her diet. Looking forward to starting in cardiac rehab. Has had no recurrence of chest pain or SOB. Has been taking walks with her dogs several times per week and tolerating well. Past Medical History:   Diagnosis Date   • Diabetes mellitus ([address])    • Graves disease    • High cholesterol    • Hypertension        12 point ROS negative other than that stated in HPI    Allergies   Allergen Reactions   • Percocet [Oxycodone-Acetaminophen] Vomiting   • Victoza [Liraglutide] GI Intolerance     .     Current Outpatient Medications:   •  ALPRAZolam (XANAX) 1 mg tablet, Take 1 mg by mouth daily at bedtime as needed for anxiety Pt unable to verify dose for RN, Disp: , Rfl:   •  aspirin (ECOTRIN LOW STRENGTH) 81 mg EC tablet, Take 1 tablet (81 mg total) by mouth daily Do not start before July 7, 2023., Disp: , Rfl: 0  •  atorvastatin (LIPITOR) 40 mg tablet, Take 40 mg by mouth daily Pt unable to verify dose for RN, Disp: , Rfl:   •  cholecalciferol (VITAMIN D3) 1,000 units tablet, Take 2,000 Units by mouth daily Pt unable to verify dose for RN, Disp: , Rfl:   •  DULoxetine (CYMBALTA) 30 mg delayed release capsule, Take 30 mg by mouth daily Pt unable to verify dose for RN, Disp: , Rfl:   •  HYDROcodone-acetaminophen (NORCO) 5-325 mg per tablet, Take 1 tablet by mouth every 6 (six) hours as needed for pain Pt unable to verify dose for RN, Disp: , Rfl:   •  hydrOXYzine HCL (ATARAX) 25 mg tablet, Take 25 mg by mouth every 6 (six) hours as needed for itching Pt unable to verify dose for RN, Disp: , Rfl:   •  lisinopril-hydrochlorothiazide (PRINZIDE,ZESTORETIC) 10-12.5 MG per tablet, Take 1 tablet by mouth daily, Disp: , Rfl:   •  metoprolol tartrate (LOPRESSOR) 25 mg tablet, Take 0.5 tablets (12.5 mg total) by mouth every 12 (twelve) hours, Disp: 30 tablet, Rfl: 0  •  omeprazole (PriLOSEC) 20 mg delayed release capsule, Take 20 mg by mouth daily Pt unable to verify dose for RN, Disp: , Rfl:   •  PATIENT MAINTAINED INSULIN PUMP, Inject 1 each under the skin every 8 (eight) hours, Disp: , Rfl:   •  ticagrelor (BRILINTA) 90 MG, Take 1 tablet (90 mg total) by mouth every 12 (twelve) hours, Disp: 60 tablet, Rfl: 0    Social History     Socioeconomic History   • Marital status: /Civil Union     Spouse name: Not on file   • Number of children: Not on file   • Years of education: Not on file   • Highest education level: Not on file   Occupational History   • Not on file   Tobacco Use   • Smoking status: Never   • Smokeless tobacco: Never   Vaping Use   • Vaping Use: Never used   Substance and Sexual Activity   • Alcohol use: Never   • Drug use: Never   • Sexual activity: Not Currently   Other Topics Concern   • Not on file   Social History Narrative   • Not on file     Social Determinants of Health     Financial Resource Strain: Not on file   Food Insecurity: No Food Insecurity (7/5/2023)    Hunger Vital Sign    • Worried About Running Out of Food in the Last Year: Never true    • Ran Out of Food in the Last Year: Never true   Transportation Needs: No Transportation Needs (7/5/2023)    PRAPARE - Transportation    • Lack of Transportation (Medical): No    • Lack of Transportation (Non-Medical): No   Physical Activity: Not on file   Stress: Not on file   Social Connections: Not on file   Intimate Partner Violence: Not on file   Housing Stability: Low Risk  (7/5/2023)    Housing Stability Vital Sign    • Unable to Pay for Housing in the Last Year: No    • Number of Places Lived in the Last Year: 1    • Unstable Housing in the Last Year: No       No family history on file.     Physical Exam:    Vitals: /68 (BP Location: Right arm, Patient Position: Sitting, Cuff Size: Standard)   Pulse 62   Ht 5' 4" (1.626 m)   Wt 109 kg (240 lb 14.4 oz)   SpO2 97%   BMI 41.35 kg/m²     Wt Readings from Last 3 Encounters:   07/05/23 110 kg (242 lb)         GEN: Dafne Lozano appears well, alert and oriented x 3, pleasant and cooperative   HEENT: pupils equal, round, and reactive to light; extraocular muscles intact  NECK: supple, no carotid bruits   HEART: regular rhythm, normal S1 and S2, no murmurs, clicks, gallops or rubs, JVP is flat    LUNGS: clear to auscultation bilaterally; no wheezes, rales, or rhonchi   ABDOMEN: normal bowel sounds, soft, no tenderness, no distention  EXTREMITIES: peripheral pulses normal; no clubbing, cyanosis, or edema  NEURO: no focal findings   SKIN: normal without suspicious lesions on exposed skin    Labs & Results:    Chemistry        Component Value Date/Time    K 3.8 07/06/2023 0604     07/06/2023 0604    CO2 27 07/06/2023 0604    BUN 11 07/06/2023 0604    CREATININE 0.99 07/06/2023 0604        Component Value Date/Time    CALCIUM 8.7 07/06/2023 0604    ALKPHOS 94 07/04/2023 1111    AST 34 07/04/2023 1111    ALT 31 07/04/2023 1111        Lab Results   Component Value Date    WBC 10.13 07/06/2023    HGB 14.6 07/06/2023    HCT 43.2 07/06/2023    MCV 88 07/06/2023     07/06/2023     No results found for: "NTBNP"   No results found for: "BNP"   Lab Results   Component Value Date    LDLCALC 33 07/05/2023     Lab Results   Component Value Date    [id number] 0.993 07/04/2023       EKG personally reviewed by PENG Quinn. No results found for this visit on 07/12/23. Counseling / Coordination of Care  Total floor / unit time spent today 40 minutes. Greater than 50% of total time was spent with the patient and / or family counseling and / or coordination of care. A description of the counseling / coordination of care: 20. Thank you for the opportunity to participate in the care of this patient.     [place]

## 2023-07-12 ENCOUNTER — OFFICE VISIT (OUTPATIENT)
Dept: CARDIOLOGY CLINIC | Facility: CLINIC | Age: 48
End: 2023-07-12
Payer: COMMERCIAL

## 2023-07-12 VITALS
HEIGHT: 64 IN | WEIGHT: 240.9 LBS | SYSTOLIC BLOOD PRESSURE: 108 MMHG | OXYGEN SATURATION: 97 % | BODY MASS INDEX: 41.13 KG/M2 | DIASTOLIC BLOOD PRESSURE: 68 MMHG | HEART RATE: 62 BPM

## 2023-07-12 DIAGNOSIS — I21.4 NSTEMI (NON-ST ELEVATED MYOCARDIAL INFARCTION) (HCC): ICD-10-CM

## 2023-07-12 PROCEDURE — 99215 OFFICE O/P EST HI 40 MIN: CPT | Performed by: NURSE PRACTITIONER

## 2023-07-12 RX ORDER — ATORVASTATIN CALCIUM 40 MG/1
40 TABLET, FILM COATED ORAL DAILY
Qty: 90 TABLET | Refills: 3 | Status: SHIPPED | OUTPATIENT
Start: 2023-07-12

## 2023-07-12 RX ORDER — NITROGLYCERIN 0.4 MG/1
0.4 TABLET SUBLINGUAL
Qty: 30 TABLET | Refills: 2 | Status: SHIPPED | OUTPATIENT
Start: 2023-07-12

## 2023-07-12 RX ORDER — LISINOPRIL AND HYDROCHLOROTHIAZIDE 12.5; 1 MG/1; MG/1
1 TABLET ORAL DAILY
Qty: 90 TABLET | Refills: 3 | Status: SHIPPED | OUTPATIENT
Start: 2023-07-12

## 2023-07-12 NOTE — PROGRESS NOTES
Cardiac Rehabilitation Plan of Care   Initial Care Plan          Today's date: 2023   # of Exercise Sessions Completed: 1  Patient name: Khai De Luna      : 1975  Age: 57 y.o. MRN: [id number]  Referring Physician: Shu Domingo  Cardiologist: Just saw PENG Garcia on 2023  Seeing Khanh Saleh MD on 2023  Provider: Tiffanie Patino  Clinician: Efrain Solis MS    Dx: NSTEMI  KSENIA x 1 and Angioplasty x 4 to mid LAD  Date of onset:  and 2023      SUMMARY OF PROGRESS:  Today is Naina's initial evaluation to begin Cardiac Rehab post NSTEMI w/ KSENIA. The patient does not currently follow a formal exercise program at home. She has resumed light ADLs reporting weakness and fatigue. Depression screening using the PHQ-9 interprets the patient's score of  12  as  10-14 = Moderate Depression. MORGAN-7 screening tool for anxiety suggests 13  10-14 = Moderate anxiety. When addressed, the patient admits to having depression/anxiety, especially since her event. Patient is on [address] with good results. Patient reports excellent social/emotional support from her friends and family. Patient's mom is currently staying with her to help around the house. Information to utilize [employer] was provided as well as contact information for counseling through [employer]. 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 Stress management will be reviewed. The patient is a non-smoker. Patient admits to 100% medication compliance. They report the following physical limitations: decreased strength, fatigue, occ. SOB. The patient completed an initial submaximal TM ETT. The patient completed 3 minutes of stage 2 (3.1METs) with test termination of RHR +30. Resting HR 77 and /66 with Normal response to exercise reaching 108 and 148/68.   Blood Pressure will be monitored throughout the program and cardiologist will be notified of elevated trends. Patient reported no symptoms with exercise. . During recovery, HR 86 and /62. Telemetry revealed NSR. Mattie Negrete was counseled on exercise guidelines to achieve a minimum of 150 mins/wk of moderate intensity (RPE 4-6) exercise and encouraged to add 1-2 days of exercise on opposite days of cardiac rehab as tolerated. We discussed current dietary habits and goals of heart healthy eating for lipid management, diabetes management and weight loss. The patient has T1D. 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 The patient's CAD risk factors include:  stress, obesity/overweight, hypertension, hyperlipidemia and diabetes. Her education will focus on lifestyle modification/education specific to Her needs. Patient will attend group education classes on heart healthy eating, reading food labels, stress management, risk factor reduction, understanding heart disease and common heart medications. Patient will attend 35 monitored exercise sessions, 2-3x/wk for 12-18 weeks beginning 7/17/2023.        Medication compliance: Yes   Comments: Pt reports to be compliant with medications  Fall Risk: Low   Comments: Ambulates with a steady gait with no assist device    EKG Interpretation: NSR      EXERCISE ASSESSMENT and PLAN    Exercise Prescription:      Frequency: 2-3 days/week   Supplement with home exercise 2+ days/wk as tolerated       Minutes: 30-35         METS: 3-4            HR: 20-30 bpm above rest   RPE: 4-6         Modalities: Treadmill, UBE, NuStep and Recumbent bike      30 Day Goals for Exercise Progression:    Frequency: 2-3 days/week of cardiac rehab       Supplement with home exercise 2+ days/wk as tolerated    Minutes: 40-45                              >150 mins/wk of moderate intensity exercise   METS: 4-5   HR: 20-30 bpm above rest    RPE: 4-6   Modalities: Treadmill, Airdyne bike, UBE, Lifecycle, Elliptical, Rower, NuStep and Recumbent bike    Strength training: Will be added following 2-3 weeks of monitored exercise sessions   Modalities: Leg Press, Chest Press, Lateral Raise, Arm Curl, Front Raises and Calf Raises    Home Exercise: none    Goals: 10% improvement in functional capacity - based on max METs achieved in fitness assessment, improved DASI score by 10%, Increase in exercise capacity by 40% - based on peak METs tolerated in cardiac rehab exercise session, Exercise 5 days/wk, >150mins/wk of moderate intensity exercise, Resume ADLs with increased strength, Return to work unrestricted and Attend Rehab regularly    Progression Toward Goals:  Reviewed Pt goals and determined plan of care, Will continue to educate and progress as tolerated. Education: benefit of exercise for CAD risk factors, home exercise guidelines, AHA guidelines to achieve >150 mins/wk of moderate exercise and RPE scale   Plan:education on home exercise guidelines, home exercise 30+ mins 2 days opposite CR and Education class: Risk Factors for Heart Disease  Readiness to change: Preparation:  (Getting ready to change)       NUTRITION ASSESSMENT AND PLAN    Weight control:    Starting weight: 240   Current weight:   Waist circumference:    Starting: NA   Current:      Diabetes: T1D  A1c: 9.5    last measured: 7/5/2023    Lipid management: Discussed diet and lipid management and Last lipid profile 7/5/2023  Chol 105  TRG 73  HDL 57  LDL 33    Goals:reduced BMI to < 25, LDL <100, HDL >40, TRG <150, CHOL <200, fasting BG  and improved A1c  < 7.0%    Measurable goals were based Rate Your Plate Dietary Self-Assessment. These are the areas in which the patient could score higher on the assessment. Goals include recommendations for a heart healthy diet based on American Heart Association.     Progression Toward Goals: Reviewed Pt goals and determined plan of care, Will continue to educate and progress as tolerated. Education: heart healthy eating  low sodium diet  hydration  nutrition for  lipid management  nutrition for Improved BG control  target goal for A1c <7.0  exercise and diabetes management   Plan: Education class: Reading Food Labels and Education Class: Heart Healthy Eating  Readiness to change: Preparation:  (Getting ready to change)       PSYCHOSOCIAL ASSESSMENT AND PLAN    Emotional:  Depression assessment:  PHQ-9 = 12  10-14 = Moderate Depression            Anxiety measure:  MORGAN-7 = 13  10-14 = Moderate anxiety   Patient notes increased depression and anxiety since her event. Prescribed XANAX with good results. Self-reported stress level:  8/10  Social support: Very Good    Goals:  Reduce perceived stress to 1-3/10, improved [facility] QOL < 27, Feelings in [place] Score < 3, Physical Fitness in [place] Score < 3, Social Support in [place] Score < 3, Daily Activity in [place] Score < 3, Social Activities in [place] Score < 3, Pain in [place] Score < 3, Overall Health in [facility] Score < 3, Quality of Life in [facility] Score < 3  and Change in Health in [facility] Score < 3     Progression Toward Goals: Reviewed Pt goals and determined plan of care, Will continue to educate and progress as tolerated. Education: signs/sxs of depression, benefits of a positive support system, stress management techniques and depression and CAD  Plan: Class: Stress and Your Health and Class: Relaxation  Readiness to change: Preparation:  (Getting ready to change)       OTHER CORE COMPONENTS     Tobacco:   Social History     Tobacco Use   Smoking Status Never   Smokeless Tobacco Never       Tobacco Use Intervention:   N/A:  Patient is a non-smoker     Anginal Symptoms:  Patient experienced fatigue, stiff neck, persistent headache, and burning throat at the time of her event. She has not had any symptoms since intervention.    NTG use: Compliant with carrying NTG, Understands proper use, Reviewed Proper use and Pt has not used NTG since event    Blood pressure:    Restin/66   Exercise: 148/68   Recovery: 124/62    Goals: consistent BP < 130/80, reduced dietary sodium <2300mg, moderate intensity exercise >150 mins/wk and medication compliance    Progression Toward Goals: Reviewed Pt goals and determined plan of care, Will continue to educate and progress as tolerated. Education:  understanding high blood pressure and it's relationship to CAD, low sodium diet and HTN, proper use of sublingual NTG, Education class:  Common Heart Medications and Education class: Understanding Heart Disease  Plan: Class: Understanding Heart Disease and Class: Common Heart Medications  Readiness to change: Preparation:  (Getting ready to change)       CARDIAC REHAB ASSESSMENT    Today's date: 2023  Patient name: Manju Garzon     : 1975       MRN: [id number]  PCP: Angie Dejesus MD  Referring Physician: Sumanth Yoo  Cardiologist: Patient saw Emma Amado on 2023  Sees Nanda Gray MD on 2023  Surgeon: Camrencita Carter DO  Dx: NSTEMI  KSENIA x 1 and Angioplasty x 4 to mid LAD  Date of onset:  and 2023  Cultural needs: None    Weight    Wt Readings from Last 1 Encounters:   23 109 kg (240 lb 14.4 oz)      Height:   Ht Readings from Last 1 Encounters:   23 5' 4" (1.626 m)     Medical History:   Past Medical History:   Diagnosis Date   • Diabetes mellitus ([address])    • Graves disease    • High cholesterol    • Hypertension          Physical Limitations: Decreased strength, fatigue, occ SOB    Fall Risk: Low   Comments: Ambulates with a steady gait with no assist device    Anginal Equivalent: Patient experienced fatigue, stiff neck, persistent headache, and burning throat at the time of her event. She has not had any symptoms since her intervention.    NTG use: Compliant with carrying NTG, Understands proper use and Reviewed Proper use    Risk Factors   Cholesterol: Yes  Smoking: Never used  HTN: Yes  DM: Type 1   Obesity: Yes   Inactivity: Yes  Stress:  perceived  stress: 8/10   Stressors: Current health condition, work, farm/property   Goals for Stress Management:Practice Relaxation Techniques, Read, Exercise and Improve Time Management    Family History:No family history on file.     Allergies: Percocet [oxycodone-acetaminophen] and Victoza [liraglutide]  ETOH:   Social History     Substance and Sexual Activity   Alcohol Use Never         Current Medications:   Current Outpatient Medications   Medication Sig Dispense Refill   • ALPRAZolam (XANAX) 1 mg tablet Take 1 mg by mouth daily at bedtime as needed for anxiety Pt unable to verify dose for RN     • aspirin (ECOTRIN LOW STRENGTH) 81 mg EC tablet Take 1 tablet (81 mg total) by mouth daily Do not start before July 7, 2023.  0   • atorvastatin (LIPITOR) 40 mg tablet Take 1 tablet (40 mg total) by mouth daily 90 tablet 3   • cholecalciferol (VITAMIN D3) 1,000 units tablet Take 2,000 Units by mouth daily Pt unable to verify dose for RN     • DULoxetine (CYMBALTA) 30 mg delayed release capsule Take 30 mg by mouth daily Pt unable to verify dose for RN     • HYDROcodone-acetaminophen (NORCO) 5-325 mg per tablet Take 1 tablet by mouth every 6 (six) hours as needed for pain Pt unable to verify dose for RN     • hydrOXYzine HCL (ATARAX) 25 mg tablet Take 25 mg by mouth every 6 (six) hours as needed for itching Pt unable to verify dose for RN     • lisinopril-hydrochlorothiazide (PRINZIDE,ZESTORETIC) 10-12.5 MG per tablet Take 1 tablet by mouth daily 90 tablet 3   • metoprolol tartrate (LOPRESSOR) 25 mg tablet Take 0.5 tablets (12.5 mg total) by mouth every 12 (twelve) hours 90 tablet 3   • nitroglycerin (NITROSTAT) 0.4 mg SL tablet Place 1 tablet (0.4 mg total) under the tongue every 5 (five) minutes as needed for chest pain 30 tablet 2   • omeprazole (PriLOSEC) 20 mg delayed release capsule Take 20 mg by mouth daily Pt unable to verify dose for RN     • PATIENT MAINTAINED INSULIN PUMP Inject 1 each under the skin every 8 (eight) hours     • ticagrelor (BRILINTA) 90 MG Take 1 tablet (90 mg total) by mouth every 12 (twelve) hours 180 tablet 11     No current facility-administered medications for this visit. 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    Short Term Program Goals: dietary modifications increased strength improved energy/stamina with ADLs exercise 120-150 mins/wk improved BG control    Long Term Goals: increased intial training workload  Improved [facility] Activity Status score  Improved functional capacity  Improved Quality of Life - [facility] score reduced  Improved lipid profile  Improved A1c  Improved fasting glucose    Ability to reach goals/rehabilitation potential:  Very Good     Projected return to function: 12 weeks  Objective tests: sub-max TM ETT      Nutritional   Reviewed details of Rate your Plate. Discussed key elements of heart healthy eating. Reviewed patient goals for dietary modifications and their clinical implications. Reviewed most recent lipid profile.      Goals for dietary modification based on Rate Your Plate Dietary Assessment:  choose lean cuts of meat  poultry without the skin  reduce portions of meat to 3 oz  low fat dairy   increase whole grains  increase fruits and vegetables  eliminate butter  more nuts/seeds  reduce sweets/frozen desserts  heathier choices while dining out      Emotional/Social  Patient reports he/she is coping well with good social support and denies depression or anxiety    Marital status:     Domestic Violence Screening: No    Comments: Patient requires skilled CR to achieve goals and maximum functional capacity. CR medically necessary for secondary prevention.

## 2023-07-13 ENCOUNTER — CLINICAL SUPPORT (OUTPATIENT)
Dept: CARDIAC REHAB | Facility: HOSPITAL | Age: 48
End: 2023-07-13
Payer: COMMERCIAL

## 2023-07-13 DIAGNOSIS — I21.4 NSTEMI (NON-ST ELEVATED MYOCARDIAL INFARCTION) (HCC): Primary | ICD-10-CM

## 2023-07-13 PROCEDURE — 93797 PHYS/QHP OP CAR RHAB WO ECG: CPT

## 2023-07-17 ENCOUNTER — TELEPHONE (OUTPATIENT)
Dept: CARDIOLOGY CLINIC | Facility: CLINIC | Age: 48
End: 2023-07-17

## 2023-07-17 NOTE — TELEPHONE ENCOUNTER
Honey Bruce was seen post hospitalization on 7/12. She called today asking if meds may be causing some symptoms she is having. When she lies down in bed at night, she is getting headaches and is also awakened during the night with muscle twitching. She is on a statin, but does not have muscle pain. She does not have a BP cuff at home to monitor her BP, and has just started cardiac rehab. Her fitbit shows a HR of 80. Next visit is with Dr Eliza Fournier on 8/25. Please advise.

## 2023-07-17 NOTE — TELEPHONE ENCOUNTER
It's possible the statin could be causing some muscle spasm and the headaches could potentially be from the new beta blocker. Would have her continue to monitor for a bit. If her symptoms don't level out or they begin to worsen, we may need to adjust therapy. Let's hold the course for now and monitor. Can always try CoQ 10 if she has any myalgias from the statin.      Thanks  Thomas Ricci

## 2023-07-19 ENCOUNTER — CLINICAL SUPPORT (OUTPATIENT)
Dept: CARDIAC REHAB | Facility: HOSPITAL | Age: 48
End: 2023-07-19
Payer: COMMERCIAL

## 2023-07-19 DIAGNOSIS — I21.4 NSTEMI (NON-ST ELEVATED MYOCARDIAL INFARCTION) (HCC): ICD-10-CM

## 2023-07-19 PROCEDURE — 93798 PHYS/QHP OP CAR RHAB W/ECG: CPT

## 2023-07-21 ENCOUNTER — CLINICAL SUPPORT (OUTPATIENT)
Dept: CARDIAC REHAB | Facility: HOSPITAL | Age: 48
End: 2023-07-21
Payer: COMMERCIAL

## 2023-07-21 DIAGNOSIS — I21.4 NSTEMI (NON-ST ELEVATED MYOCARDIAL INFARCTION) (HCC): ICD-10-CM

## 2023-07-21 PROCEDURE — 93798 PHYS/QHP OP CAR RHAB W/ECG: CPT

## 2023-07-23 NOTE — ED ATTENDING ATTESTATION
7/4/2023  ITonya MD, saw and evaluated the patient. I have discussed the patient with the resident/non-physician practitioner and agree with the resident's/non-physician practitioner's findings, Plan of Care, and MDM as documented in the resident's/non-physician practitioner's note, except where noted. All available labs and Radiology studies were reviewed. I was present for key portions of any procedure(s) performed by the resident/non-physician practitioner and I was immediately available to provide assistance. At this point I agree with the current assessment done in the Emergency Department. I have conducted an independent evaluation of this patient a history and physical is as follows:    ED Course     Patient presents for evaluation due to several days of severe headache. Patient states that on Friday she had the worst headache she is ever experienced and was seen at [facility].  While there, she had an EKG performed because she was experiencing chest pain and a strep test but no additional work-up appears to have been done. Patient also reports having neck pain, light sensitivity, and sound sensitivity. She also reports numbness and tingling in her arms. No additional complaints. Exam: AAOx3, moderate distress due to headache, RRR, CTA, S/NT/ND, no focal motor/sensory deficits. A/P: Headache, neck pain. Given presentation, concern for possible subarachnoid hemorrhage. Will order CTA head and neck. Given the patient is also having some chest pain and had a reported abnormal EKG, will repeat EKG and check troponin. If no source identified, will consider lumbar puncture to evaluate for xanthochromia and possible meningitis given complaint of neck pain and headache.     Critical Care Time  Procedures

## 2023-07-24 ENCOUNTER — APPOINTMENT (OUTPATIENT)
Dept: CARDIAC REHAB | Facility: HOSPITAL | Age: 48
End: 2023-07-24
Payer: COMMERCIAL

## 2023-07-26 ENCOUNTER — APPOINTMENT (OUTPATIENT)
Dept: CARDIAC REHAB | Facility: HOSPITAL | Age: 48
End: 2023-07-26
Payer: COMMERCIAL

## 2023-07-26 ENCOUNTER — TELEPHONE (OUTPATIENT)
Dept: CARDIOLOGY CLINIC | Facility: CLINIC | Age: 48
End: 2023-07-26

## 2023-07-26 ENCOUNTER — APPOINTMENT (EMERGENCY)
Dept: RADIOLOGY | Facility: HOSPITAL | Age: 48
End: 2023-07-26
Payer: COMMERCIAL

## 2023-07-26 ENCOUNTER — HOSPITAL ENCOUNTER (EMERGENCY)
Facility: HOSPITAL | Age: 48
Discharge: HOME | End: 2023-07-26
Attending: EMERGENCY MEDICINE
Payer: COMMERCIAL

## 2023-07-26 VITALS
DIASTOLIC BLOOD PRESSURE: 59 MMHG | HEART RATE: 54 BPM | OXYGEN SATURATION: 96 % | SYSTOLIC BLOOD PRESSURE: 115 MMHG | TEMPERATURE: 97.5 F | RESPIRATION RATE: 16 BRPM

## 2023-07-26 DIAGNOSIS — R51.9 HEADACHE: ICD-10-CM

## 2023-07-26 DIAGNOSIS — R07.9 CHEST PAIN: Primary | ICD-10-CM

## 2023-07-26 LAB
ALBUMIN SERPL BCP-MCNC: 3.3 G/DL (ref 3.5–5)
ALP SERPL-CCNC: 75 U/L (ref 46–116)
ALT SERPL W P-5'-P-CCNC: 34 U/L (ref 12–78)
ANION GAP SERPL CALCULATED.3IONS-SCNC: 3 MMOL/L
AST SERPL W P-5'-P-CCNC: 23 U/L (ref 5–45)
ATRIAL RATE: 61 BPM
BASOPHILS # BLD AUTO: 0.06 THOUSANDS/ÂΜL (ref 0–0.1)
BASOPHILS NFR BLD AUTO: 1 % (ref 0–1)
BILIRUB SERPL-MCNC: 0.31 MG/DL (ref 0.2–1)
BUN SERPL-MCNC: 11 MG/DL (ref 5–25)
CALCIUM ALBUM COR SERPL-MCNC: 9.3 MG/DL (ref 8.3–10.1)
CALCIUM SERPL-MCNC: 8.7 MG/DL (ref 8.3–10.1)
CARDIAC TROPONIN I PNL SERPL HS: 4 NG/L
CHLORIDE SERPL-SCNC: 108 MMOL/L (ref 96–108)
CO2 SERPL-SCNC: 27 MMOL/L (ref 21–32)
CREAT SERPL-MCNC: 1.02 MG/DL (ref 0.6–1.3)
EOSINOPHIL # BLD AUTO: 0.47 THOUSAND/ÂΜL (ref 0–0.61)
EOSINOPHIL NFR BLD AUTO: 4 % (ref 0–6)
ERYTHROCYTE [DISTWIDTH] IN BLOOD BY AUTOMATED COUNT: 12.5 % (ref 11.6–15.1)
GFR SERPL CREATININE-BSD FRML MDRD: 66 ML/MIN/1.73SQ M
GLUCOSE SERPL-MCNC: 146 MG/DL (ref 65–140)
HCT VFR BLD AUTO: 39.2 % (ref 34.8–46.1)
HGB BLD-MCNC: 13 G/DL (ref 11.5–15.4)
IMM GRANULOCYTES # BLD AUTO: 0.03 THOUSAND/UL (ref 0–0.2)
IMM GRANULOCYTES NFR BLD AUTO: 0 % (ref 0–2)
LYMPHOCYTES # BLD AUTO: 2.69 THOUSANDS/ÂΜL (ref 0.6–4.47)
LYMPHOCYTES NFR BLD AUTO: 25 % (ref 14–44)
MCH RBC QN AUTO: 29.3 PG (ref 26.8–34.3)
MCHC RBC AUTO-ENTMCNC: 33.2 G/DL (ref 31.4–37.4)
MCV RBC AUTO: 88 FL (ref 82–98)
MONOCYTES # BLD AUTO: 0.79 THOUSAND/ÂΜL (ref 0.17–1.22)
MONOCYTES NFR BLD AUTO: 7 % (ref 4–12)
NEUTROPHILS # BLD AUTO: 6.69 THOUSANDS/ÂΜL (ref 1.85–7.62)
NEUTS SEG NFR BLD AUTO: 63 % (ref 43–75)
NRBC BLD AUTO-RTO: 0 /100 WBCS
P AXIS: 2 DEGREES
PLATELET # BLD AUTO: 245 THOUSANDS/UL (ref 149–390)
PMV BLD AUTO: 10.5 FL (ref 8.9–12.7)
POTASSIUM SERPL-SCNC: 4.2 MMOL/L (ref 3.5–5.3)
PR INTERVAL: 138 MS
PROT SERPL-MCNC: 6.3 G/DL (ref 6.4–8.4)
QRS AXIS: 25 DEGREES
QRSD INTERVAL: 66 MS
QT INTERVAL: 390 MS
QTC INTERVAL: 392 MS
RBC # BLD AUTO: 4.44 MILLION/UL (ref 3.81–5.12)
SODIUM SERPL-SCNC: 138 MMOL/L (ref 135–147)
T WAVE AXIS: 72 DEGREES
VENTRICULAR RATE: 61 BPM
WBC # BLD AUTO: 10.73 THOUSAND/UL (ref 4.31–10.16)

## 2023-07-26 PROCEDURE — 93308 TTE F-UP OR LMTD: CPT | Performed by: EMERGENCY MEDICINE

## 2023-07-26 PROCEDURE — 85025 COMPLETE CBC W/AUTO DIFF WBC: CPT

## 2023-07-26 PROCEDURE — 96375 TX/PRO/DX INJ NEW DRUG ADDON: CPT

## 2023-07-26 PROCEDURE — 76604 US EXAM CHEST: CPT | Performed by: EMERGENCY MEDICINE

## 2023-07-26 PROCEDURE — 96365 THER/PROPH/DIAG IV INF INIT: CPT

## 2023-07-26 PROCEDURE — 96376 TX/PRO/DX INJ SAME DRUG ADON: CPT

## 2023-07-26 PROCEDURE — 93010 ELECTROCARDIOGRAM REPORT: CPT | Performed by: INTERNAL MEDICINE

## 2023-07-26 PROCEDURE — 99285 EMERGENCY DEPT VISIT HI MDM: CPT | Performed by: EMERGENCY MEDICINE

## 2023-07-26 PROCEDURE — 71046 X-RAY EXAM CHEST 2 VIEWS: CPT

## 2023-07-26 PROCEDURE — 80053 COMPREHEN METABOLIC PANEL: CPT

## 2023-07-26 PROCEDURE — 84484 ASSAY OF TROPONIN QUANT: CPT

## 2023-07-26 PROCEDURE — 93005 ELECTROCARDIOGRAM TRACING: CPT

## 2023-07-26 PROCEDURE — 96361 HYDRATE IV INFUSION ADD-ON: CPT

## 2023-07-26 PROCEDURE — 36415 COLL VENOUS BLD VENIPUNCTURE: CPT

## 2023-07-26 PROCEDURE — 99285 EMERGENCY DEPT VISIT HI MDM: CPT

## 2023-07-26 RX ORDER — METOCLOPRAMIDE HYDROCHLORIDE 5 MG/ML
10 INJECTION INTRAMUSCULAR; INTRAVENOUS ONCE
Status: COMPLETED | OUTPATIENT
Start: 2023-07-26 | End: 2023-07-26

## 2023-07-26 RX ORDER — MORPHINE SULFATE 10 MG/ML
8 INJECTION, SOLUTION INTRAMUSCULAR; INTRAVENOUS ONCE
Status: COMPLETED | OUTPATIENT
Start: 2023-07-26 | End: 2023-07-26

## 2023-07-26 RX ORDER — ASPIRIN 81 MG/1
243 TABLET, CHEWABLE ORAL ONCE
Status: COMPLETED | OUTPATIENT
Start: 2023-07-26 | End: 2023-07-26

## 2023-07-26 RX ORDER — MORPHINE SULFATE 4 MG/ML
4 INJECTION, SOLUTION INTRAMUSCULAR; INTRAVENOUS ONCE
Status: COMPLETED | OUTPATIENT
Start: 2023-07-26 | End: 2023-07-26

## 2023-07-26 RX ORDER — DIPHENHYDRAMINE HYDROCHLORIDE 50 MG/ML
25 INJECTION INTRAMUSCULAR; INTRAVENOUS ONCE
Status: COMPLETED | OUTPATIENT
Start: 2023-07-26 | End: 2023-07-26

## 2023-07-26 RX ORDER — MAGNESIUM SULFATE HEPTAHYDRATE 40 MG/ML
2 INJECTION, SOLUTION INTRAVENOUS ONCE
Status: COMPLETED | OUTPATIENT
Start: 2023-07-26 | End: 2023-07-26

## 2023-07-26 RX ORDER — KETOROLAC TROMETHAMINE 30 MG/ML
15 INJECTION, SOLUTION INTRAMUSCULAR; INTRAVENOUS ONCE
Status: COMPLETED | OUTPATIENT
Start: 2023-07-26 | End: 2023-07-26

## 2023-07-26 RX ORDER — ONDANSETRON 2 MG/ML
4 INJECTION INTRAMUSCULAR; INTRAVENOUS ONCE
Status: COMPLETED | OUTPATIENT
Start: 2023-07-26 | End: 2023-07-26

## 2023-07-26 RX ADMIN — SODIUM CHLORIDE 500 ML: 0.9 INJECTION, SOLUTION INTRAVENOUS at 16:14

## 2023-07-26 RX ADMIN — KETOROLAC TROMETHAMINE 15 MG: 30 INJECTION, SOLUTION INTRAMUSCULAR at 16:14

## 2023-07-26 RX ADMIN — ONDANSETRON HYDROCHLORIDE 4 MG: 2 INJECTION, SOLUTION INTRAMUSCULAR; INTRAVENOUS at 14:23

## 2023-07-26 RX ADMIN — ASPIRIN 81 MG CHEWABLE TABLET 243 MG: 81 TABLET CHEWABLE at 14:22

## 2023-07-26 RX ADMIN — MORPHINE SULFATE 8 MG: 10 INJECTION, SOLUTION INTRAMUSCULAR; INTRAVENOUS at 16:14

## 2023-07-26 RX ADMIN — MORPHINE SULFATE 4 MG: 4 INJECTION, SOLUTION INTRAMUSCULAR; INTRAVENOUS at 14:23

## 2023-07-26 RX ADMIN — METOCLOPRAMIDE HYDROCHLORIDE 10 MG: 5 INJECTION INTRAMUSCULAR; INTRAVENOUS at 17:11

## 2023-07-26 RX ADMIN — MAGNESIUM SULFATE HEPTAHYDRATE 2 G: 40 INJECTION, SOLUTION INTRAVENOUS at 17:11

## 2023-07-26 RX ADMIN — DIPHENHYDRAMINE HYDROCHLORIDE 25 MG: 50 INJECTION, SOLUTION INTRAMUSCULAR; INTRAVENOUS at 17:07

## 2023-07-26 NOTE — TELEPHONE ENCOUNTER
Patient called today c/o discomfort in left side of chest.  Described as a dull ache, which is new and started yesterday. Almost like a "muscle pull" per patient, but she has not engaged in any physical activity recently to cause muscle pain. She has been attending Cardiac Rehab post NSTEMI with stents in early July. She saw our NP for a hospital follow up on 7/12 and will follow up with Dr Diogo Palacios as a new patient on 8/26. Patient was to attend a cardiac rehab session today, but she cancelled due to the chest discomfort and feeling very fatigued. She also complained of fatigue at the office visit. She is not short of breath, does not monitor BP at home but has a FitBit showing a normal HR. She said she has a feeling that something is wrong. I advised ER if symptoms worsen. There is an open NP visit tomorrow if recommended. Please advise.

## 2023-07-26 NOTE — ED PROCEDURE NOTE
Procedure  POC Cardiac US    Date/Time: 7/26/2023 4:12 PM    Performed by: Liss Coleman MD  Authorized by: Liss Coleman MD    Patient location:  ED  Other Assisting Provider: Yes (comment) Anyi Salinas    Procedure details:     Exam Type:  Diagnostic    Indications: suspected volume depletion and chest pain      Assessment / Evaluation for: cardiac function, pericardial effusion, intravascular volume status and right heart strain (suspected pulmonary embolism)      Exam Type: initial exam      Image quality: diagnostic      Image availability:  Images available in PACS and video obtained  Patient Details:     Cardiac Rhythm:  Regular    Mechanical ventilation: No    Cardiac findings:     Echo technique: limited 2D      Views obtained: parasternal long axis, parasternal short axis, subcostal and apical      Pericardial effusion: absent      Tamponade physiology: absent      Wall motion: normal      LV systolic function: normal      RV dilation: none    Pulmonary findings:     Left Lung Findings: left lung sliding      Right lung findings: right lung sliding      B-lines: no B-lines present                       Liss Coleman MD  07/26/23 1614

## 2023-07-26 NOTE — ED PROVIDER NOTES
History  Chief Complaint   Patient presents with   • Chest Pain     Pt c/o left sided chest pressure x1 day that radiates to the collarbone. +dizziness, +nausea      HPI  Patient is a 57 y.o. female with history of NSTEMI 3 weeks ago status post 1 cardiac stent presenting to the emergency department for left-sided chest pain. Patient states that yesterday morning she developed left-sided chest pain. States that pain radiates into her left shoulder into her left arm. States that her pain feels similar to her previous NSTEMI but is located on the opposite side of her chest.  Denies having any shortness of breath. Does complain of having intermittent dizziness and nausea. Denies having any recent injury or heavy lifting. Prior to Admission Medications   Prescriptions Last Dose Informant Patient Reported? Taking? ALPRAZolam (XANAX) 1 mg tablet   Yes No   Sig: Take 1 mg by mouth daily at bedtime as needed for anxiety Pt unable to verify dose for RN   DULoxetine (CYMBALTA) 30 mg delayed release capsule   Yes No   Sig: Take 30 mg by mouth daily Pt unable to verify dose for RN   HYDROcodone-acetaminophen (NORCO) 5-325 mg per tablet   Yes No   Sig: Take 1 tablet by mouth every 6 (six) hours as needed for pain Pt unable to verify dose for RN   PATIENT MAINTAINED INSULIN PUMP   Yes No   Sig: Inject 1 each under the skin every 8 (eight) hours   aspirin (ECOTRIN LOW STRENGTH) 81 mg EC tablet   No No   Sig: Take 1 tablet (81 mg total) by mouth daily Do not start before July 7, 2023.    atorvastatin (LIPITOR) 40 mg tablet   No No   Sig: Take 1 tablet (40 mg total) by mouth daily   cholecalciferol (VITAMIN D3) 1,000 units tablet   Yes No   Sig: Take 2,000 Units by mouth daily Pt unable to verify dose for RN   hydrOXYzine HCL (ATARAX) 25 mg tablet   Yes No   Sig: Take 25 mg by mouth every 6 (six) hours as needed for itching Pt unable to verify dose for RN   lisinopril-hydrochlorothiazide (PRINZIDE,ZESTORETIC) 10-12.5 MG per tablet   No No   Sig: Take 1 tablet by mouth daily   metoprolol tartrate (LOPRESSOR) 25 mg tablet   No No   Sig: Take 0.5 tablets (12.5 mg total) by mouth every 12 (twelve) hours   nitroglycerin (NITROSTAT) 0.4 mg SL tablet   No No   Sig: Place 1 tablet (0.4 mg total) under the tongue every 5 (five) minutes as needed for chest pain   omeprazole (PriLOSEC) 20 mg delayed release capsule   Yes No   Sig: Take 20 mg by mouth daily Pt unable to verify dose for RN   ticagrelor (BRILINTA) 90 MG   No No   Sig: Take 1 tablet (90 mg total) by mouth every 12 (twelve) hours      Facility-Administered Medications: None       Past Medical History:   Diagnosis Date   • Diabetes mellitus ([address])    • Graves disease    • High cholesterol    • Hypertension        Past Surgical History:   Procedure Laterality Date   • ABDOMINAL SURGERY      Gallbladder removal   • CARDIAC CATHETERIZATION Left 7/5/2023    Procedure: Cardiac Left Heart Cath;  Surgeon: Joe Lake DO;  Location: [facility]; Service: Cardiology   • CARDIAC CATHETERIZATION N/A 7/5/2023    Procedure: Cardiac Coronary Angiogram;  Surgeon: Joe Lake DO;  Location: [facility]; Service: Cardiology   • HYSTERECTOMY         History reviewed. No pertinent family history. I have reviewed and agree with the history as documented. E-Cigarette/Vaping   • E-Cigarette Use Never User      E-Cigarette/Vaping Substances   • Nicotine No    • THC No    • CBD No    • Flavoring No    • Other No    • Unknown No      Social History     Tobacco Use   • Smoking status: Never   • Smokeless tobacco: Never   Vaping Use   • Vaping Use: Never used   Substance Use Topics   • Alcohol use: Never   • Drug use: Never        Review of Systems   Constitutional: Negative for chills and fever. HENT: Negative for congestion. Eyes: Negative for redness. Respiratory: Negative for cough and shortness of breath. Cardiovascular: Positive for chest pain.  Negative for palpitations and leg swelling. Gastrointestinal: Negative for abdominal pain, diarrhea and vomiting. Endocrine: Negative for polyuria. Genitourinary: Negative for dysuria and hematuria. Musculoskeletal: Negative for arthralgias and back pain. Skin: Negative for rash. Neurological: Negative for light-headedness and headaches. All other systems reviewed and are negative. Physical Exam  ED Triage Vitals   Temperature Pulse Respirations Blood Pressure SpO2   07/26/23 1323 07/26/23 1323 07/26/23 1323 07/26/23 1323 07/26/23 1323   97.5 °F (36.4 °C) 65 16 125/80 96 %      Temp Source Heart Rate Source Patient Position - Orthostatic VS BP Location FiO2 (%)   07/26/23 1323 07/26/23 1323 07/26/23 1323 07/26/23 1323 --   Temporal Monitor Sitting Left arm       Pain Score       07/26/23 1423       9             Orthostatic Vital Signs  Vitals:    07/26/23 1323 07/26/23 1530 07/26/23 1715 07/26/23 1800   BP: 125/80 (!) 106/46 104/50 115/59   Pulse: 65 (!) 54 62 (!) 54   Patient Position - Orthostatic VS: Sitting  Lying Lying       Physical Exam  Vitals and nursing note reviewed. Constitutional:       Appearance: Normal appearance. HENT:      Head: Normocephalic and atraumatic. Mouth/Throat:      Mouth: Mucous membranes are moist.   Eyes:      Conjunctiva/sclera: Conjunctivae normal.   Cardiovascular:      Rate and Rhythm: Normal rate and regular rhythm. Pulses: Normal pulses. Radial pulses are 2+ on the right side and 2+ on the left side. Dorsalis pedis pulses are 2+ on the right side and 2+ on the left side. Heart sounds: Normal heart sounds. Pulmonary:      Effort: Pulmonary effort is normal. No tachypnea or accessory muscle usage. Breath sounds: Normal breath sounds. Chest:      Chest wall: Tenderness present. Abdominal:      Palpations: Abdomen is soft. Tenderness: There is no abdominal tenderness. Musculoskeletal:         General: No tenderness. Cervical back: Neck supple. Skin:     General: Skin is warm and dry. Capillary Refill: Capillary refill takes less than 2 seconds. Neurological:      General: No focal deficit present. Mental Status: She is alert. Mental status is at baseline.    Psychiatric:         Mood and Affect: Mood normal.         ED Medications  Medications   ondansetron (ZOFRAN) injection 4 mg (4 mg Intravenous Given 7/26/23 1423)   aspirin chewable tablet 243 mg (243 mg Oral Given 7/26/23 1422)   morphine injection 4 mg (4 mg Intravenous Given 7/26/23 1423)   sodium chloride 0.9 % bolus 500 mL (0 mL Intravenous Stopped 7/26/23 1807)   ketorolac (TORADOL) injection 15 mg (15 mg Intravenous Given 7/26/23 1614)   morphine injection 8 mg (8 mg Intravenous Given 7/26/23 1614)   diphenhydrAMINE (BENADRYL) injection 25 mg (25 mg Intravenous Given 7/26/23 1707)   metoclopramide (REGLAN) injection 10 mg (10 mg Intravenous Given 7/26/23 1711)   magnesium sulfate 2 g/50 mL IVPB (premix) 2 g (0 g Intravenous Stopped 7/26/23 1807)       Diagnostic Studies  Results Reviewed     Procedure Component Value Units Date/Time    HS Troponin 0hr (reflex protocol) [[id number]]  (Normal) Collected: 07/26/23 1423    Lab Status: Final result Specimen: Blood from Arm, Left Updated: 07/26/23 1510     hs TnI 0hr 4 ng/L     Comprehensive metabolic panel [[id number]]  (Abnormal) Collected: 07/26/23 1423    Lab Status: Final result Specimen: Blood from Arm, Left Updated: 07/26/23 1451     Sodium 138 mmol/L      Potassium 4.2 mmol/L      Chloride 108 mmol/L      CO2 27 mmol/L      ANION GAP 3 mmol/L      BUN 11 mg/dL      Creatinine 1.02 mg/dL      Glucose 146 mg/dL      Calcium 8.7 mg/dL      Corrected Calcium 9.3 mg/dL      AST 23 U/L      ALT 34 U/L      Alkaline Phosphatase 75 U/L      Total Protein 6.3 g/dL      Albumin 3.3 g/dL      Total Bilirubin 0.31 mg/dL      eGFR 66 ml/min/1.73sq m     Narrative:      [facility] guidelines for Chronic Kidney Disease (CKD):   •  Stage 1 with normal or high GFR (GFR > 90 mL/min/1.73 square meters)  •  Stage 2 Mild CKD (GFR = 60-89 mL/min/1.73 square meters)  •  Stage 3A Moderate CKD (GFR = 45-59 mL/min/1.73 square meters)  •  Stage 3B Moderate CKD (GFR = 30-44 mL/min/1.73 square meters)  •  Stage 4 Severe CKD (GFR = 15-29 mL/min/1.73 square meters)  •  Stage 5 End Stage CKD (GFR <15 mL/min/1.73 square meters)  Note: GFR calculation is accurate only with a steady state creatinine    CBC and differential [[id number]]  (Abnormal) Collected: 07/26/23 1423    Lab Status: Final result Specimen: Blood from Arm, Left Updated: 07/26/23 1433     WBC 10.73 Thousand/uL      RBC 4.44 Million/uL      Hemoglobin 13.0 g/dL      Hematocrit 39.2 %      MCV 88 fL      MCH 29.3 pg      MCHC 33.2 g/dL      RDW 12.5 %      MPV 10.5 fL      Platelets 095 Thousands/uL      nRBC 0 /100 WBCs      Neutrophils Relative 63 %      Immat GRANS % 0 %      Lymphocytes Relative 25 %      Monocytes Relative 7 %      Eosinophils Relative 4 %      Basophils Relative 1 %      Neutrophils Absolute 6.69 Thousands/µL      Immature Grans Absolute 0.03 Thousand/uL      Lymphocytes Absolute 2.69 Thousands/µL      Monocytes Absolute 0.79 Thousand/µL      Eosinophils Absolute 0.47 Thousand/µL      Basophils Absolute 0.06 Thousands/µL     POCT pregnancy, urine [[id number]]     Lab Status: No result                  XR chest 2 views   ED Interpretation by Pinky Mcdaniels DO (07/26 1547)   No acute cardiopulmonary abnormality       Final Result by Leandro Michel MD (07/26 1643)      No acute cardiopulmonary disease.                   Workstation performed: [id number]               Procedures  POC Cardiac US    Date/Time: 7/26/2023 3:48 PM    Performed by: Pinky Mcdaniels DO  Authorized by: Pinky Mcdaniels DO    Patient location:  ED  Other Assisting Provider: Yes (comment) (Dr. Heather Tijerina)    Procedure details:     Exam Type:  Diagnostic Indications: chest pain      Assessment / Evaluation for: cardiac function and pericardial effusion      Exam Type: initial exam      Image quality: diagnostic      Image availability:  Images available in PACS  Patient Details:     Cardiac Rhythm:  Regular    Mechanical ventilation: No    Cardiac findings:     Echo technique: limited 2D      Views obtained: parasternal long axis, parasternal short axis, subcostal and apical      Pericardial effusion: absent      Tamponade physiology: absent      Wall motion: normal      LV systolic function: normal      RV dilation: none    Pulmonary findings:     Left Lung Findings: left lung sliding      Right lung findings: right lung sliding      B-lines: no B-lines present            ED Course  ED Course as of 07/26/23 1829 Wed Jul 26, 2023   1442 Hemoglobin: 13.0  No acute anemia   1443 Procedure Note: EKG  Date/Time: 07/26/23 2:43 PM   Interpreted by: Dragan Montes De Oca  Indications / Diagnosis: chest pain  ECG reviewed by me, the ED Provider: yes   The EKG demonstrates:  Rate: 61  Rhythm: NSR  Intervals: regular  Axis: regular  QRS/Blocks: regular  ST Changes: No acute ST Changes, no STD/MICHAELLE. Compared to prior EKG on 7/4/23, no acute change. 1453 Creatinine: 1.02  No JOSEFINA   1525 hs TnI 0hr: 4  Symptoms started >24 hours ago, will not get 2 hour delta troponin. 1654 Patient complaining of continued headache. Will give Magnesium, benadryl, reglan. Already had toradol. HEART Risk Score    Flowsheet Row Most Recent Value   Heart Score Risk Calculator    History 2 Filed at: 07/26/2023 1549   ECG 1 Filed at: 07/26/2023 1549   Age 1 Filed at: 07/26/2023 1549   Risk Factors 2 Filed at: 07/26/2023 1549   Troponin 0 Filed at: 07/26/2023 1549   HEART Score 6 Filed at: 07/26/2023 1549                      SBIRT 22yo+    Flowsheet Row Most Recent Value   Initial Alcohol Screen: US AUDIT-C     1.  How often do you have a drink containing alcohol? 0 Filed at: 07/26/2023 1328   2. How many drinks containing alcohol do you have on a typical day you are drinking? 0 Filed at: 07/26/2023 1325   3a. Male UNDER 65: How often do you have five or more drinks on one occasion? 0 Filed at: 07/26/2023 1325   3b. FEMALE Any Age, or MALE 65+: How often do you have 4 or more drinks on one occassion? 0 Filed at: 07/26/2023 1325   Audit-C Score 0 Filed at: 07/26/2023 1325   OTIS: How many times in the past year have you. .. Used an illegal drug or used a prescription medication for non-medical reasons? Never Filed at: 07/26/2023 1325                Medical Decision Making  Amount and/or Complexity of Data Reviewed  Labs: ordered. Decision-making details documented in ED Course. Radiology: ordered. Risk  OTC drugs. Prescription drug management. Patient is a 57 y.o. female with PMH of NSTEMI status post stent who presents to the ED with left-sided chest pain. Vital signs stable. On exam some left-sided chest wall tenderness to palpation otherwise no abnormalities on examination. Given patient's history of recent NSTEMI with similar symptoms at this time, concern for possible ACS. Also considered Dressler syndrome, pneumonia, arrhythmia. Will obtain cardiac work-up, chest x-ray, EKG. Patient takes a 1 mg of aspirin daily, will give additional at this time. View ED course above for further discussion on patient workup. On review of previous records patient had cardiac catheterization on 7/5/2023 which showed mid LAD lesion with 95% stenosis. All labs reviewed and utilized in the medical decision making process  All radiology studies independently viewed by me and interpreted by the radiologist.  I reviewed all testing with the patient. Upon re-evaluation pain improved, patient comfortable with discharge home. I have reviewed the patient's vital signs, nursing notes, and other relevant tests/information.  I had a detailed discussion with the patient regarding the history, exam findings, and any diagnostic results. Plan to discharge home in stable condition, follow up with PCP and cardiologist.  Discussed with patient who is agreeable to plan. I discussed discharge instructions, need for follow-up, and oral return precautions for what to return for in addition to the written return precautions and discharge instructions, specifically highlighting areas of special concern. The patient verbalized understanding of the discharge instructions and warnings that would necessitate return to the Emergency Department including worsening pain, worsening headache, neurological deficits. All questions the patient had were answered prior to discharge to the best of my ability. Disposition  Final diagnoses:   Chest pain   Headache     Time reflects when diagnosis was documented in both MDM as applicable and the Disposition within this note     Time User Action Codes Description Comment    7/26/2023  6:06 PM Louie Feng Add [R07.9] Chest pain     7/26/2023  6:06 PM Louie Feng Add [R51.9] Headache       ED Disposition     ED Disposition   Discharge    Condition   Stable    Date/Time   Wed Jul 26, 2023  6:06 PM    231Riley Strange discharge to home/self care.                Follow-up Information     Follow up With Specialties Details Why Contact Info    Tata Arriaza MD Internal Medicine Call in 1 week  [address] [address] [phone]            Discharge Medication List as of 7/26/2023  6:07 PM      CONTINUE these medications which have NOT CHANGED    Details   ALPRAZolam (XANAX) 1 mg tablet Take 1 mg by mouth daily at bedtime as needed for anxiety Pt unable to verify dose for RN, Historical Med      aspirin (ECOTRIN LOW STRENGTH) 81 mg EC tablet Take 1 tablet (81 mg total) by mouth daily Do not start before July 7, 2023., Starting Fri 7/7/2023, No Print      atorvastatin (LIPITOR) 40 mg tablet Take 1 tablet (40 mg total) by mouth daily, Starting Wed 7/12/2023, Normal      cholecalciferol (VITAMIN D3) 1,000 units tablet Take 2,000 Units by mouth daily Pt unable to verify dose for RN, Historical Med      DULoxetine (CYMBALTA) 30 mg delayed release capsule Take 30 mg by mouth daily Pt unable to verify dose for RN, Historical Med      HYDROcodone-acetaminophen (NORCO) 5-325 mg per tablet Take 1 tablet by mouth every 6 (six) hours as needed for pain Pt unable to verify dose for RN, Historical Med      hydrOXYzine HCL (ATARAX) 25 mg tablet Take 25 mg by mouth every 6 (six) hours as needed for itching Pt unable to verify dose for RN, Historical Med      lisinopril-hydrochlorothiazide (PRINZIDE,ZESTORETIC) 10-12.5 MG per tablet Take 1 tablet by mouth daily, Starting Wed 7/12/2023, Normal      metoprolol tartrate (LOPRESSOR) 25 mg tablet Take 0.5 tablets (12.5 mg total) by mouth every 12 (twelve) hours, Starting Wed 7/12/2023, Normal      nitroglycerin (NITROSTAT) 0.4 mg SL tablet Place 1 tablet (0.4 mg total) under the tongue every 5 (five) minutes as needed for chest pain, Starting Wed 7/12/2023, Normal      omeprazole (PriLOSEC) 20 mg delayed release capsule Take 20 mg by mouth daily Pt unable to verify dose for RN, Historical Med      PATIENT MAINTAINED INSULIN PUMP Inject 1 each under the skin every 8 (eight) hours, Historical Med      ticagrelor (BRILINTA) 90 MG Take 1 tablet (90 mg total) by mouth every 12 (twelve) hours, Starting Wed 7/12/2023, Normal           No discharge procedures on file. PDMP Review     None           ED Provider  Attending physically available and evaluated Sunita Patel. I managed the patient along with the ED Attending.     Electronically Signed by         Nat Sellers DO  07/26/23 6880

## 2023-07-26 NOTE — DISCHARGE INSTRUCTIONS
You were seen in the emergency department today for chest pain and headache. Your testing showed your troponin was 4. Follow up with your primary care provider. Take your medications as prescribed. Take Tylenol / Ibuprofen as needed following the instructions on the bottle for pain. Return to the emergency department for any new or concerning symptoms including worsening chest pain, focal weakness, speech changes. Thank you for choosing [facility] for your care today.

## 2023-08-01 NOTE — ED ATTENDING ATTESTATION
7/26/2023  ILoree MD, saw and evaluated the patient. I have discussed the patient with the resident/non-physician practitioner and agree with the resident's/non-physician practitioner's findings, Plan of Care, and MDM as documented in the resident's/non-physician practitioner's note, except where noted. All available labs and Radiology studies were reviewed. I was present for key portions of any procedure(s) performed by the resident/non-physician practitioner and I was immediately available to provide assistance. At this point I agree with the current assessment done in the Emergency Department. I have conducted an independent evaluation of this patient a history and physical is as follows:      Patient is a 80-year-old female who presents with chest pain onset of symptoms was 1 day. Patient scribes pain as pressure-like in nature rating to collarbone associate symptoms clued dizziness and nausea. Patient states her symptoms feel similar to her previous non-STEMI however is located on the outside of her chest.  She denies any shortness of breath denies any recent injury or heavy lifting. Vitals reviewed. Heart regular rate and rhythm without murmurs. Patient with chest wall tenderness present. Lungs clear to auscultation bilaterally. Abdomen soft nontender nondistended normal bowel sounds. Extremities no edema. Impression: Chest pain  Differential diagnosis: ACS, MI, arrhythmia, pneumothorax, pneumonia, pulmonary embolism. Doubt dissection. Plan to check ECG, chest x-ray pregnancy test, CBC, CMP, troponin    I considered the possibility of pulmonary embolism in my differential diagnosis. Patient has a low Wells risk score for pulmonary embolism additionally, patient meets PE rule out criteria therefore will not pursue D-dimer testing at this time. We will treat patient's chest pain and headache with IV fluids aspirin morphine ketorolac Benadryl Reglan mag sulfate.   ED Course      ECG independently interpreted by me: Normal sinus rhythm, normal rate normal axis normal intervals no acute ST-T changes. Chest x-ray independently interpreted by me: No acute cardiopulmonary disease. Labs reviewed: CBC unremarkable. CMP unremarkable. Initial troponin 4. Given length of symptoms will not pursue delta troponin at this time. And ED point-of-care bedside cardiac ultrasound was performed by resident please refer to primary chart for note. Decision for hospitalization. Based on history age risk factors and ECG patient has a heart score of 6. Patient was offered hospitalization however upon further discussion with patient by resident patient will go home follow-up PCP and cardiology as an outpatient. Patient given strict return precautions to return should her symptoms return or worsen anytime patient verbalized understanding of all instructions.     Critical Care Time  Procedures

## 2023-08-02 ENCOUNTER — CLINICAL SUPPORT (OUTPATIENT)
Dept: CARDIAC REHAB | Facility: HOSPITAL | Age: 48
End: 2023-08-02
Payer: COMMERCIAL

## 2023-08-02 DIAGNOSIS — I21.4 NSTEMI (NON-ST ELEVATED MYOCARDIAL INFARCTION) (HCC): ICD-10-CM

## 2023-08-02 PROCEDURE — 93798 PHYS/QHP OP CAR RHAB W/ECG: CPT

## 2023-08-04 ENCOUNTER — CLINICAL SUPPORT (OUTPATIENT)
Dept: CARDIAC REHAB | Facility: HOSPITAL | Age: 48
End: 2023-08-04
Payer: COMMERCIAL

## 2023-08-04 DIAGNOSIS — I21.4 NSTEMI (NON-ST ELEVATED MYOCARDIAL INFARCTION) (HCC): ICD-10-CM

## 2023-08-04 PROCEDURE — 93798 PHYS/QHP OP CAR RHAB W/ECG: CPT

## 2023-08-07 ENCOUNTER — APPOINTMENT (OUTPATIENT)
Dept: CARDIAC REHAB | Facility: HOSPITAL | Age: 48
End: 2023-08-07
Payer: COMMERCIAL

## 2023-08-09 ENCOUNTER — CLINICAL SUPPORT (OUTPATIENT)
Dept: CARDIAC REHAB | Facility: HOSPITAL | Age: 48
End: 2023-08-09
Payer: COMMERCIAL

## 2023-08-09 DIAGNOSIS — I21.4 NSTEMI (NON-ST ELEVATED MYOCARDIAL INFARCTION) (HCC): Primary | ICD-10-CM

## 2023-08-09 PROCEDURE — 93798 PHYS/QHP OP CAR RHAB W/ECG: CPT

## 2023-08-11 ENCOUNTER — APPOINTMENT (OUTPATIENT)
Dept: CARDIAC REHAB | Facility: HOSPITAL | Age: 48
End: 2023-08-11
Payer: COMMERCIAL

## 2023-08-11 NOTE — PROGRESS NOTES
Cardiac Rehabilitation Plan of Care   30 Day Reassessment          Today's date: 2023   # of Exercise Sessions Completed:   Patient name: Rodo Ramirez      : 1975  Age: 57 y.o. MRN: [id number]  Referring Physician: Jazmin Terrazas PA-C  Cardiologist: Just saw PENG Syed on 2023  Seeing Kumar Truong MD on 2023  Provider: Kiera Hull  Clinician: Ani Leger. Sophie Ferrari, ROSANNE, CCRP    Dx: NSTEMI  KSENIA x 1 and Angioplasty x 4 to mid LAD  Date of onset:  and 2023      SUMMARY OF PROGRESS: Mayelin Kenny has completed 6 exercise sessions at Cardiac Rehab post NSTEMI w/ KSENIA. The patient does not currently follow a formal exercise program at home. She has resumed light to moderate ADLs reporting weakness and fatigue. Depression screening using the PHQ-9 interprets the patient's score of  12  as  10-14 = Moderate Depression. MORGAN-7 screening tool for anxiety suggests 13  10-14 = Moderate anxiety. When addressed, the patient admits to having depression/anxiety, especially since her event. Patient is on [address] with good results. Patient reports excellent social/emotional support from her friends and family. Patient's mom is currently staying with her to help around the house. Information to utilize [employer] was provided as well as contact information for counseling through [employer]. 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 Stress management will be reviewed. The patient is a non-smoker. Patient admits to 100% medication compliance. She continues to  the following physical limitations: decreased strength, fatigue, occ. SOB. Resting HR 61 and /62 with Normal response to exercise reaching 96 and 148/66. Blood Pressure will be monitored throughout the program and cardiologist will be notified of elevated trends. Patient reported no symptoms with exercise. . During recovery, HR 70 and /56. Telemetry revealed NSR. Rajeev Conner was counseled on exercise guidelines to achieve a minimum of 150 mins/wk of moderate intensity (RPE 4-6) exercise and encouraged to add 1-2 days of exercise on opposite days of cardiac rehab as tolerated. We discussed current dietary habits and goals of heart healthy eating for lipid management, diabetes management and weight loss. The patient has T1D. 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 The patient's CAD risk factors include:  stress, obesity/overweight, hypertension, hyperlipidemia and diabetes. Her education will focus on lifestyle modification/education specific to Her needs. Patient will attend group education classes on heart healthy eating, reading food labels, stress management, risk factor reduction, understanding heart disease and common heart medications. Patient has been compliant attending her CR sessions. She gives great effort during each session. Currently she is able to work at a 3.08 MET Level. She has correct hemodynamic responses to the activity. She has been receiving weekly education. Refer to [facility] documentation for a list of completed topics. Her program will continue to be progressed as she is able to tolerate. Symptoms related to her DM Type 1 and Grave's disease limit progress at this time. Medication compliance: Yes   Comments: Pt reports to be compliant with medications  Fall Risk: Low   Comments: Ambulates with a steady gait with no assist device    EKG Interpretation: NSR      EXERCISE ASSESSMENT and PLAN    Exercise Prescription:      Frequency: 2-3 days/week   Supplement with home exercise 2+ days/wk as tolerated       Minutes: 40-45        METS: 3.08            HR:    RPE: 4-6/10         Modalities: Treadmill, UBE, NuStep and Recumbent bike   Able to achieve 1.1 miles on the [facility] in a 12 minute period. 30 Day Goals for Exercise Progression:    Frequency: 2-3 days/week of cardiac rehab       Supplement with home exercise 2+ days/wk as tolerated    Minutes: 45-50                              >150 mins/wk of moderate intensity exercise   METS: 4-5   HR:     RPE: 4-6/10   Modalities: Treadmill, Airdyne bike, UBE, Lifecycle, Elliptical, Rower, NuStep and Recumbent bike    Strength training: Will be added following 2-3 weeks of monitored exercise sessions   Modalities: Leg Press, Chest Press, Lateral Raise, Arm Curl, Front Raises and Calf Raises    Home Exercise: none    Goals: 10% improvement in functional capacity - based on max METs achieved in fitness assessment, improved DASI score by 10%, Increase in exercise capacity by 40% - based on peak METs tolerated in cardiac rehab exercise session, Exercise 5 days/wk, >150mins/wk of moderate intensity exercise, Resume ADLs with increased strength, Return to work unrestricted and Attend Rehab regularly    Progression Toward Goals: Will continue to educate and progress as tolerated., Goals met: attends CR regularly and able to perform moderate ADLs. .    Education: benefit of exercise for CAD risk factors, home exercise guidelines, AHA guidelines to achieve >150 mins/wk of moderate exercise and RPE scale   Plan:education on home exercise guidelines, home exercise 30+ mins 2 days opposite CR and Education class: Risk Factors for Heart Disease  Readiness to change: Action:  (Changing behavior)      NUTRITION ASSESSMENT AND PLAN    Weight control:    Starting weight: 240   Current weight: 240  Waist circumference:    Starting: NA   Current:  NA    Diabetes: T1D  A1c: 9.5    last measured: 7/5/2023    Lipid management: Discussed diet and lipid management and Last lipid profile 7/5/2023  Chol 105  TRG 73  HDL 57  LDL 33    Goals:reduced BMI to < 25, LDL <100, HDL >40, TRG <150, CHOL <200, fasting BG  and improved A1c  < 7.0%    Measurable goals were based Rate Your Plate Dietary Self-Assessment. These are the areas in which the patient could score higher on the assessment. Goals include recommendations for a heart healthy diet based on American Heart Association. Progression Toward Goals: Pt is progressing and showing improvement  toward the following goals:  improving diet and Lipid Panel/A1C.  , Will continue to educate and progress as tolerated., Goals not met: no weight loss during this reporting period. .      Education: heart healthy eating  low sodium diet  hydration  nutrition for  lipid management  nutrition for Improved BG control  target goal for A1c <7.0  exercise and diabetes management   Plan: Education class: Reading Food Labels and Education Class: Heart Healthy Eating  Readiness to change: Action:  (Changing behavior)      PSYCHOSOCIAL ASSESSMENT AND PLAN    Emotional:  Depression assessment:  PHQ-9 = 12  10-14 = Moderate Depression            Anxiety measure:  MORGAN-7 = 13  10-14 = Moderate anxiety   Patient notes increased depression and anxiety since her event. Prescribed XANAX with good results. Self-reported stress level:  8/10  Social support: Very Good    Goals:  Reduce perceived stress to 1-3/10, improved [facility] < 27, Feelings in [place] Score < 3, Physical Fitness in [place] Score < 3, Social Support in [place] Score < 3, Daily Activity in [place] Score < 3, Social Activities in [place] Score < 3, Pain in [place] Score < 3, Overall Health in [facility] Score < 3, Quality of Life in [email or web address]oth Score < 3  and Change in Health in [facility] Score < 3     Progression Toward Goals: Pt has not made progress toward the following goals: reducing stress level. , Will continue to educate and progress as tolerated.     Education: signs/sxs of depression, benefits of a positive support system, stress management techniques and depression and CAD  Plan: Class: Stress and Your Health and Class: Relaxation  Readiness to change: Preparation: (Getting ready to change)       OTHER CORE COMPONENTS     Tobacco:   Social History     Tobacco Use   Smoking Status Never   Smokeless Tobacco Never       Tobacco Use Intervention:   N/A:  Patient is a non-smoker     Anginal Symptoms:  Patient experienced fatigue, stiff neck, persistent headache, and burning throat at the time of her event. She has not had any symptoms since intervention. NTG use: Compliant with carrying NTG, Understands proper use, Reviewed Proper use and Pt has not used NTG since event    Blood pressure:    Restin/62   Exercise: 148/68   Recovery: 124/56    Goals: consistent BP < 130/80, reduced dietary sodium <2300mg, moderate intensity exercise >150 mins/wk and medication compliance    Progression Toward Goals: Pt is progressing and showing improvement  toward the following goals:  resting BP consistently < 130/80.  , Will continue to educate and progress as tolerated.     Education:  understanding high blood pressure and it's relationship to CAD, low sodium diet and HTN, proper use of sublingual NTG, Education class:  Common Heart Medications and Education class: Understanding Heart Disease  Plan: Class: Understanding Heart Disease and Class: Common Heart Medications  Readiness to change: Action:  (Changing behavior)

## 2023-08-16 ENCOUNTER — CLINICAL SUPPORT (OUTPATIENT)
Dept: CARDIAC REHAB | Facility: HOSPITAL | Age: 48
End: 2023-08-16
Payer: COMMERCIAL

## 2023-08-16 DIAGNOSIS — I21.4 NSTEMI (NON-ST ELEVATED MYOCARDIAL INFARCTION) (HCC): ICD-10-CM

## 2023-08-16 PROCEDURE — 93798 PHYS/QHP OP CAR RHAB W/ECG: CPT

## 2023-08-18 ENCOUNTER — CLINICAL SUPPORT (OUTPATIENT)
Dept: CARDIAC REHAB | Facility: HOSPITAL | Age: 48
End: 2023-08-18
Payer: COMMERCIAL

## 2023-08-18 DIAGNOSIS — I21.4 NSTEMI (NON-ST ELEVATED MYOCARDIAL INFARCTION) (HCC): ICD-10-CM

## 2023-08-18 PROCEDURE — 93798 PHYS/QHP OP CAR RHAB W/ECG: CPT

## 2023-08-23 ENCOUNTER — CLINICAL SUPPORT (OUTPATIENT)
Dept: CARDIAC REHAB | Facility: HOSPITAL | Age: 48
End: 2023-08-23
Payer: COMMERCIAL

## 2023-08-23 DIAGNOSIS — I21.4 NSTEMI (NON-ST ELEVATED MYOCARDIAL INFARCTION) (HCC): ICD-10-CM

## 2023-08-23 PROCEDURE — 93798 PHYS/QHP OP CAR RHAB W/ECG: CPT

## 2023-08-25 ENCOUNTER — APPOINTMENT (OUTPATIENT)
Dept: CARDIAC REHAB | Facility: HOSPITAL | Age: 48
End: 2023-08-25
Payer: COMMERCIAL

## 2023-08-25 ENCOUNTER — OFFICE VISIT (OUTPATIENT)
Dept: CARDIOLOGY CLINIC | Facility: CLINIC | Age: 48
End: 2023-08-25
Payer: COMMERCIAL

## 2023-08-25 VITALS
SYSTOLIC BLOOD PRESSURE: 110 MMHG | DIASTOLIC BLOOD PRESSURE: 66 MMHG | HEART RATE: 60 BPM | HEIGHT: 64 IN | WEIGHT: 243 LBS | BODY MASS INDEX: 41.48 KG/M2 | OXYGEN SATURATION: 97 %

## 2023-08-25 DIAGNOSIS — I10 ESSENTIAL HYPERTENSION: Primary | ICD-10-CM

## 2023-08-25 DIAGNOSIS — I35.0 AORTIC VALVE STENOSIS, ETIOLOGY OF CARDIAC VALVE DISEASE UNSPECIFIED: ICD-10-CM

## 2023-08-25 DIAGNOSIS — I25.10 CORONARY ARTERY DISEASE INVOLVING NATIVE CORONARY ARTERY OF NATIVE HEART WITHOUT ANGINA PECTORIS: ICD-10-CM

## 2023-08-25 DIAGNOSIS — E10.59 TYPE 1 DIABETES MELLITUS WITH OTHER CIRCULATORY COMPLICATION (HCC): ICD-10-CM

## 2023-08-25 DIAGNOSIS — I21.4 NSTEMI (NON-ST ELEVATED MYOCARDIAL INFARCTION) (HCC): ICD-10-CM

## 2023-08-25 PROCEDURE — 99214 OFFICE O/P EST MOD 30 MIN: CPT | Performed by: INTERNAL MEDICINE

## 2023-08-25 RX ORDER — OMEPRAZOLE 20 MG/1
20 CAPSULE, DELAYED RELEASE ORAL DAILY
COMMUNITY
Start: 2023-07-03

## 2023-08-25 RX ORDER — ROSUVASTATIN CALCIUM 40 MG/1
40 TABLET, COATED ORAL DAILY
Qty: 90 TABLET | Refills: 3 | Status: SHIPPED | OUTPATIENT
Start: 2023-08-25

## 2023-08-25 NOTE — PROGRESS NOTES
Cardiology Outpatient Follow-Up Note - Destini Sargent 57 y.o. female MRN: [id number]      Assessment/Plan:    1. NSTEMI (non-ST elevated myocardial infarction) ([address])  Continue DAPT for 12 month duration then aspirin 81 mg daily indefinitely. Currently on aspirin and Brilinta. - Ambulatory referral to Cardiology    2. Essential hypertension  Controlled on current therapy. 3. Coronary artery disease involving native coronary artery of native heart without angina pectoris  Intensify statin therapy -- will go for maximum tolerated therapy despite LDL level. Change atorvastatin 40 mg to rosuvastatin 40 mg daily. - rosuvastatin (CRESTOR) 40 MG tablet; Take 1 tablet (40 mg total) by mouth daily  Dispense: 90 tablet; Refill: 3  - Empagliflozin (Jardiance) 25 MG TABS; Take 1 tablet (25 mg total) by mouth every morning  Dispense: 90 tablet; Refill: 3    4. Type 1 diabetes mellitus with other circulatory complication ([address])  Recommend aggressive glucose control with target HbA1c of 7%. She is presently on insulin pump. Add Jardiance 25 mg daily for glucose lowering and ASCVD risk reduction.   - Empagliflozin (Jardiance) 25 MG TABS; Take 1 tablet (25 mg total) by mouth every morning  Dispense: 90 tablet; Refill: 3    5. Aortic valve stenosis, etiology of cardiac valve disease unspecified  Her AV is likely biscupid but image quality is limited by TTE. We will check a DARRICK for AV morphology. - DARRICK; Future      We will see Destini Sargent back in 6 months for routine follow-up. Subjective:     HPI: Destini Sargent is a 57y.o. year old female with morbid obesity, T1DM, HLD, and premature CAD with NSTEMI and PCI to LAD in July 2023 presenting to establish care. She presented with atypical symptoms SLB and found to have elevated troponin > 4000. She underwent cardiac cath which showed a mid-LAD 95% culprit lesion which was stented (Xience Skypoint 3 x 33 mm KSENIA).      Echo showed normal LV function and mild-moderate AS. I personally reviewed and interpreted the echo images from 7/5/23 and agree as reported that her AV is possibly bicuspid. She has T1DM on insulin pump with recent A1c 9.5. Prior to MI she had been on atorvastatin 40 mg daily with LDL of 33 mg/dL       Cardiac Testing:    Cardiac Cath 7/5/23   Impression         Mid LAD lesion is 95% stenosed. Culprit of NSTEMI. S/P successful IVUS-guided PCI to the mid LAD with KSENIA x 1. Mild reisdual CAD amenable to GDMT. LVEDP normal without gradient on LV-AO pullback     Recommendation    Cont GDMT optimization for CAD  DAPT for at least 12 months  Aggressive risk factor reduction   on diet/lifestyle modification  Cardiac rehab upon discharge       Echo 7/5/23 Interpretation Summary         Left Ventricle: Left ventricular cavity size is normal. Wall thickness is normal. The left ventricular ejection fraction is 65%. Systolic function is normal. Diastolic function is normal.    The following segments are hypokinetic: apical septal, apical inferior and apex. All other segments are normal.    Aortic Valve: The aortic valve is possibly bicuspid. The leaflets are not thickened. The leaflets are not calcified. The leaflets exhibit normal mobility. There is mild to moderate stenosis. The aortic valve mean gradient is 17 mmHg. The dimensionless velocity index is 0.41. The aortic valve area is 1.60 cm2. EKGs, personally reviewed:  N/a    Relevant Labs & Results:  CMP 7/26/23 -- reviewed, K 4.2, Cr 1.02  CBC 7/26/23 -- WNL  Lipid profile 7/05/23 -- LDL 33 mg/dL  HbA1c 7/5/23 -- 9.5%        ROS:  Review of Systems:  Review of Systems    Objective:     Vitals:   Vitals:    08/25/23 1010   BP: 110/66   BP Location: Left arm   Patient Position: Sitting   Cuff Size: Standard   Pulse: 60   SpO2: 97%   Weight: 110 kg (243 lb)   Height: 5' 4" (1.626 m)    Body surface area is 2.12 meters squared.   Wt Readings from Last 3 Encounters:   08/25/23 110 kg (243 lb)   07/12/23 109 kg (240 lb 14.4 oz)   07/05/23 110 kg (242 lb)       Physical Exam:  General: Dorothy Grady is a well appearing female, in no acute distress, sitting comfortably  HEENT: moist mucous membranes, EOMI  Neck:  No JVD, supple, trachea midline  Cardiovascular: unremarkable S1/S2, regular rate and rhythm, 3/6 SM  Pulmonary: normal respiratory effort, CTAB  Abdomen: soft and nondistended  Extremities: No lower extremity edema. Warm and well perfused extremities. Neuro: no focal motor deficits, AAOx3 (person, place, time)  Psych: Normal mood and affect, cooperative      Medications (at the START of this encounter):   Outpatient Medications Prior to Visit   Medication Sig Dispense Refill    ALPRAZolam (XANAX) 1 mg tablet Take 1 mg by mouth daily at bedtime as needed for anxiety Pt unable to verify dose for RN      aspirin (ECOTRIN LOW STRENGTH) 81 mg EC tablet Take 1 tablet (81 mg total) by mouth daily Do not start before July 7, 2023.  0    atorvastatin (LIPITOR) 40 mg tablet Take 1 tablet (40 mg total) by mouth daily 90 tablet 3    cholecalciferol (VITAMIN D3) 1,000 units tablet Take 2,000 Units by mouth daily Pt unable to verify dose for RN      DULoxetine (CYMBALTA) 30 mg delayed release capsule Take 30 mg by mouth daily Pt unable to verify dose for RN      HYDROcodone-acetaminophen (NORCO) 5-325 mg per tablet Take 1 tablet by mouth every 6 (six) hours as needed for pain Pt unable to verify dose for RN      hydrOXYzine HCL (ATARAX) 25 mg tablet Take 25 mg by mouth every 6 (six) hours as needed for itching Pt unable to verify dose for RN      lisinopril-hydrochlorothiazide (PRINZIDE,ZESTORETIC) 10-12.5 MG per tablet Take 1 tablet by mouth daily 90 tablet 3    metoprolol tartrate (LOPRESSOR) 25 mg tablet Take 0.5 tablets (12.5 mg total) by mouth every 12 (twelve) hours 90 tablet 3    nitroglycerin (NITROSTAT) 0.4 mg SL tablet Place 1 tablet (0.4 mg total) under the tongue every 5 (five) minutes as needed for chest pain 30 tablet 2    omeprazole (PriLOSEC) 20 mg delayed release capsule Take 20 mg by mouth daily Pt unable to verify dose for RN      omeprazole (PriLOSEC) 20 mg delayed release capsule Take 20 mg by mouth daily      PATIENT MAINTAINED INSULIN PUMP Inject 1 each under the skin every 8 (eight) hours      ticagrelor (BRILINTA) 90 MG Take 1 tablet (90 mg total) by mouth every 12 (twelve) hours 180 tablet 11     No facility-administered medications prior to visit. Time Spent:  Total time (face-to-face and non-face-to-face) spent on today's visit was 45 minutes. This includes preparation for the visits (i.e. reviewing test results), performance of a medically appropriate history and examination, and orders for medications, tests or other procedures. This time is exclusive of procedures performed and time spent teaching. This note was completed in part utilizing [employer] voice recognition software. Grammatical errors, random word insertion, spelling mistakes, occasional wrong word or "sound-alike" substitutions and incomplete sentences may be an occasional consequence of the system secondary to software limitations, ambient noise and hardware issues. At the time of dictation, efforts were made to edit, clarify and /or correct errors. Please read the chart carefully and recognize, using context, where substitutions have occurred. If you have any questions or concerns about the context, text or information contained within the body of this dictation, please contact myself, the provider, for further clarification.

## 2023-08-28 ENCOUNTER — APPOINTMENT (OUTPATIENT)
Dept: CARDIAC REHAB | Facility: HOSPITAL | Age: 48
End: 2023-08-28
Payer: COMMERCIAL

## 2023-08-29 ENCOUNTER — TELEPHONE (OUTPATIENT)
Dept: CARDIAC REHAB | Facility: HOSPITAL | Age: 48
End: 2023-08-29

## 2023-08-29 NOTE — TELEPHONE ENCOUNTER
Received call from patient stating that she is scheduled for a DARRICK on 9/7/2023 to check and see if her AV is bicuspid and the cause of her current symptoms (SOB, fatigue, occ lightheadedness). Patient will be placed on a brief medical hold until testing. Patient agreeable with this plan and will contact CR department following testing.

## 2023-08-30 ENCOUNTER — APPOINTMENT (OUTPATIENT)
Dept: CARDIAC REHAB | Facility: HOSPITAL | Age: 48
End: 2023-08-30
Payer: COMMERCIAL

## 2023-08-31 ENCOUNTER — TELEPHONE (OUTPATIENT)
Dept: CARDIOLOGY CLINIC | Facility: CLINIC | Age: 48
End: 2023-08-31

## 2023-08-31 NOTE — TELEPHONE ENCOUNTER
Received a fax from [phone] [facility] Floor surgery asking how long pt can if pt can hold Brilinta prior to colonscopy?

## 2023-09-07 ENCOUNTER — HOSPITAL ENCOUNTER (OUTPATIENT)
Dept: NON INVASIVE DIAGNOSTICS | Facility: HOSPITAL | Age: 48
Discharge: HOME | End: 2023-09-07
Payer: COMMERCIAL

## 2023-09-07 ENCOUNTER — ANESTHESIA (OUTPATIENT)
Dept: NON INVASIVE DIAGNOSTICS | Facility: HOSPITAL | Age: 48
End: 2023-09-07

## 2023-09-07 ENCOUNTER — ANESTHESIA EVENT (OUTPATIENT)
Dept: NON INVASIVE DIAGNOSTICS | Facility: HOSPITAL | Age: 48
End: 2023-09-07

## 2023-09-07 VITALS
SYSTOLIC BLOOD PRESSURE: 97 MMHG | WEIGHT: 243 LBS | DIASTOLIC BLOOD PRESSURE: 57 MMHG | HEART RATE: 59 BPM | HEIGHT: 64 IN | OXYGEN SATURATION: 94 % | RESPIRATION RATE: 16 BRPM | BODY MASS INDEX: 41.48 KG/M2

## 2023-09-07 DIAGNOSIS — I35.0 AORTIC VALVE STENOSIS, ETIOLOGY OF CARDIAC VALVE DISEASE UNSPECIFIED: ICD-10-CM

## 2023-09-07 PROBLEM — E11.9 DIABETES MELLITUS (HCC): Status: ACTIVE | Noted: 2023-07-04

## 2023-09-07 PROBLEM — E05.90 HYPERTHYROIDISM: Status: ACTIVE | Noted: 2018-04-19

## 2023-09-07 LAB
ASCENDING AORTA: 2.8 CM
AV LVOT MEAN GRADIENT: 1.5 MMHG
AV LVOT PEAK GRADIENT: 2.6 MMHG
AV MEAN GRADIENT: 15 MMHG
AV PEAK GRADIENT: 24 MMHG
AV VALVE AREA: 1.08 CM2
AV VELOCITY RATIO: 0.32
AVA (PLAN): 1.3 CM2
DOP CALC AO PEAK VEL: 2.5 M/S
DOP CALC AO VTI: 59.4 CM
DOP CALC LVOT AREA: 3.14 CM2
DOP CALC LVOT DIAMETER: 2 CM
DOP CALC LVOT PEAK VEL VTI: 20.5 CM
DOP CALC LVOT PEAK VEL: 0.8 M/S
DOP CALC LVOT STROKE INDEX: 30.4 ML/M2
DOP CALC LVOT STROKE VOLUME: 64.37 CM3
SINUS: 2.4 CM
SL CV LV EF: 60
TR PEAK VELOCITY: 2.4 M/S

## 2023-09-07 PROCEDURE — 93325 DOPPLER ECHO COLOR FLOW MAPG: CPT | Performed by: INTERNAL MEDICINE

## 2023-09-07 PROCEDURE — 93320 DOPPLER ECHO COMPLETE: CPT | Performed by: INTERNAL MEDICINE

## 2023-09-07 PROCEDURE — 93312 ECHO TRANSESOPHAGEAL: CPT

## 2023-09-07 PROCEDURE — 93312 ECHO TRANSESOPHAGEAL: CPT | Performed by: INTERNAL MEDICINE

## 2023-09-07 PROCEDURE — 76376 3D RENDER W/INTRP POSTPROCES: CPT

## 2023-09-07 PROCEDURE — 76376 3D RENDER W/INTRP POSTPROCES: CPT | Performed by: INTERNAL MEDICINE

## 2023-09-07 RX ORDER — PROPOFOL 10 MG/ML
INJECTION, EMULSION INTRAVENOUS AS NEEDED
Status: DISCONTINUED | OUTPATIENT
Start: 2023-09-07 | End: 2023-09-07

## 2023-09-07 RX ORDER — PROPOFOL 10 MG/ML
INJECTION, EMULSION INTRAVENOUS CONTINUOUS PRN
Status: DISCONTINUED | OUTPATIENT
Start: 2023-09-07 | End: 2023-09-07

## 2023-09-07 RX ORDER — SODIUM CHLORIDE 9 MG/ML
INJECTION, SOLUTION INTRAVENOUS CONTINUOUS PRN
Status: DISCONTINUED | OUTPATIENT
Start: 2023-09-07 | End: 2023-09-07

## 2023-09-07 RX ORDER — KETAMINE HCL IN NACL, ISO-OSM 100MG/10ML
SYRINGE (ML) INJECTION AS NEEDED
Status: DISCONTINUED | OUTPATIENT
Start: 2023-09-07 | End: 2023-09-07

## 2023-09-07 RX ADMIN — Medication 20 MG: at 09:14

## 2023-09-07 RX ADMIN — PROPOFOL 20 MG: 10 INJECTION, EMULSION INTRAVENOUS at 09:16

## 2023-09-07 RX ADMIN — SODIUM CHLORIDE: 0.9 INJECTION, SOLUTION INTRAVENOUS at 08:52

## 2023-09-07 RX ADMIN — PROPOFOL 120 MCG/KG/MIN: 10 INJECTION, EMULSION INTRAVENOUS at 09:16

## 2023-09-07 RX ADMIN — TOPICAL ANESTHETIC 1 SPRAY: 200 SPRAY DENTAL; PERIODONTAL at 09:14

## 2023-09-07 RX ADMIN — PROPOFOL 80 MG: 10 INJECTION, EMULSION INTRAVENOUS at 09:14

## 2023-09-07 NOTE — ANESTHESIA POSTPROCEDURE EVALUATION
Post-Op Assessment Note    CV Status:  Stable  Pain Score: 0    Pain management: adequate     Mental Status:  Sleepy and awake   Hydration Status:  Stable   PONV Controlled:  None   Airway Patency:  Patent      Post Op Vitals Reviewed: Yes      Staff: CRNA         No notable events documented.     BP   122/68   Temp      Pulse  62   Resp   14   SpO2   99

## 2023-09-07 NOTE — ANESTHESIA PREPROCEDURE EVALUATION
Procedure:  DARRICK    Relevant Problems   CARDIO   (+) Coronary artery disease involving native coronary artery of native heart without angina pectoris   (+) Essential hypertension   (+) Hyperlipidemia   (+) NSTEMI (non-ST elevated myocardial infarction)      ENDO   (+) Hyperthyroidism      GI/HEPATIC   (+) Esophageal reflux      MUSCULOSKELETAL   (+) Fibromyalgia      NEURO/PSYCH   (+) Anxiety   (+) Fibromyalgia      PULMONARY   (+) Sleep apnea      Endocrine   (+) Diabetes mellitus (HCC)      Other   (+) Morbid obesity        Physical Exam    Airway    Mallampati score: III  TM Distance: >3 FB  Neck ROM: full     Dental       Cardiovascular      Pulmonary      Other Findings        Anesthesia Plan  ASA Score- 4     Anesthesia Type- IV sedation with anesthesia with ASA Monitors. Additional Monitors:   Airway Plan:           Plan Factors-    Chart reviewed. EKG reviewed. Existing labs reviewed. Patient summary reviewed. Induction- intravenous. Postoperative Plan-     Informed Consent- Anesthetic plan and risks discussed with patient. I personally reviewed this patient with the CRNA. Discussed and agreed on the Anesthesia Plan with the CRNA. Maribel Mckeon

## 2023-09-08 ENCOUNTER — TELEPHONE (OUTPATIENT)
Dept: CARDIOLOGY CLINIC | Facility: CLINIC | Age: 48
End: 2023-09-08

## 2023-09-08 ENCOUNTER — TELEPHONE (OUTPATIENT)
Dept: NON INVASIVE DIAGNOSTICS | Facility: HOSPITAL | Age: 48
End: 2023-09-08

## 2023-09-08 NOTE — TELEPHONE ENCOUNTER
P/C would like to have you review the DARRICK results, and get your opinion. Silvestre Ortiz is aware you are off and would like to discuss next week or when you are able.    Also has started cardiac rehab    Please advise      C/b [phone]

## 2023-09-12 NOTE — TELEPHONE ENCOUNTER
P/C looking to discuss the results of DARRICK she had done.      Also would like to return to cardiac rehab     ANG off    [address] [place] [address]  and left message on  that Dr Len Curtis is off will sent to covering dr. Yonathan Mathews [phone]

## 2023-09-12 NOTE — TELEPHONE ENCOUNTER
Reviewed. She has a congenitally bicuspid AV, born with it, it is not severely stenotic meaning it is not restricted in its opening to a severe degree, so nothing to do at this time, and cardiac rehab is fine to return to.

## 2023-09-12 NOTE — TELEPHONE ENCOUNTER
Called and advised pt results. Pt verbally understood. Advised will still sent over to ANG for his review    Pt would like to review when you return.  C/b [phone]

## 2023-09-26 ENCOUNTER — TELEPHONE (OUTPATIENT)
Dept: CARDIAC REHAB | Facility: HOSPITAL | Age: 48
End: 2023-09-26

## 2023-09-26 NOTE — TELEPHONE ENCOUNTER
Spoke w/patient. She had a DARRICK performed. Patient states she has a congenital valve issue that does not require intervention at this time.  Patient is scheduled to return Oct. 2 nd at 11 AM.

## 2023-10-17 ENCOUNTER — TELEPHONE (OUTPATIENT)
Dept: CARDIAC REHAB | Facility: HOSPITAL | Age: 48
End: 2023-10-17

## 2023-10-31 ENCOUNTER — TELEPHONE (OUTPATIENT)
Dept: ENDOCRINOLOGY | Facility: CLINIC | Age: 48
End: 2023-10-31

## 2023-10-31 ENCOUNTER — OFFICE VISIT (OUTPATIENT)
Dept: ENDOCRINOLOGY | Facility: CLINIC | Age: 48
End: 2023-10-31
Payer: COMMERCIAL

## 2023-10-31 VITALS
WEIGHT: 254.25 LBS | HEIGHT: 64 IN | BODY MASS INDEX: 43.41 KG/M2 | DIASTOLIC BLOOD PRESSURE: 80 MMHG | SYSTOLIC BLOOD PRESSURE: 110 MMHG | TEMPERATURE: 97.8 F | OXYGEN SATURATION: 98 % | HEART RATE: 72 BPM | RESPIRATION RATE: 16 BRPM

## 2023-10-31 DIAGNOSIS — E10.9 TYPE 1 DIABETES MELLITUS WITHOUT COMPLICATION (HCC): Primary | ICD-10-CM

## 2023-10-31 DIAGNOSIS — E78.2 MIXED HYPERLIPIDEMIA: ICD-10-CM

## 2023-10-31 DIAGNOSIS — E10.9 TYPE 1 DIABETES MELLITUS WITHOUT COMPLICATION (HCC): ICD-10-CM

## 2023-10-31 DIAGNOSIS — E66.01 MORBID OBESITY (HCC): ICD-10-CM

## 2023-10-31 DIAGNOSIS — I10 ESSENTIAL HYPERTENSION: ICD-10-CM

## 2023-10-31 DIAGNOSIS — E05.00 GRAVES DISEASE: ICD-10-CM

## 2023-10-31 DIAGNOSIS — E05.90 HYPERTHYROIDISM: ICD-10-CM

## 2023-10-31 LAB — SL AMB POCT HEMOGLOBIN AIC: 9.2 (ref ?–6.5)

## 2023-10-31 PROCEDURE — 95251 CONT GLUC MNTR ANALYSIS I&R: CPT | Performed by: STUDENT IN AN ORGANIZED HEALTH CARE EDUCATION/TRAINING PROGRAM

## 2023-10-31 PROCEDURE — 99244 OFF/OP CNSLTJ NEW/EST MOD 40: CPT | Performed by: STUDENT IN AN ORGANIZED HEALTH CARE EDUCATION/TRAINING PROGRAM

## 2023-10-31 PROCEDURE — 83036 HEMOGLOBIN GLYCOSYLATED A1C: CPT | Performed by: STUDENT IN AN ORGANIZED HEALTH CARE EDUCATION/TRAINING PROGRAM

## 2023-10-31 RX ORDER — INSULIN ASPART 100 [IU]/ML
INJECTION, SOLUTION INTRAVENOUS; SUBCUTANEOUS
Qty: 10 ML | Refills: 1 | Status: SHIPPED | OUTPATIENT
Start: 2023-10-31 | End: 2023-10-31 | Stop reason: SDUPTHER

## 2023-10-31 RX ORDER — INSULIN ASPART 100 [IU]/ML
INJECTION, SOLUTION INTRAVENOUS; SUBCUTANEOUS
COMMUNITY
Start: 2023-10-02 | End: 2023-10-31 | Stop reason: SDUPTHER

## 2023-10-31 RX ORDER — INSULIN ASPART 100 [IU]/ML
INJECTION, SOLUTION INTRAVENOUS; SUBCUTANEOUS
Qty: 110 ML | Refills: 0 | Status: SHIPPED | OUTPATIENT
Start: 2023-10-31

## 2023-10-31 RX ORDER — ATORVASTATIN CALCIUM 40 MG/1
TABLET, FILM COATED ORAL
COMMUNITY
Start: 2023-10-24

## 2023-10-31 NOTE — TELEPHONE ENCOUNTER
Patient is asking for more vials for the novolog she usually gets 11 vials for the 3 month supply.    Only has one vial left

## 2023-10-31 NOTE — PROGRESS NOTES
New Patient Progress Note      Cc: diabetes    Referring Provider  No referring provider defined for this encounter. History of Present Illness:   Tanya Leger is a 57 y.o. female with a history of type 1 diabetes since age 15 who presents to establish care with us. She was following with [facility] Endocrinology Dr Jake Couch and last visit was in September 2022. She is S/P NSTEMI with PCI/KSENIA to the LAD in July 2023  Cardiology team ordered Jardiance 25 mg daily. Diabetes is complicated by neuropathy,       Patient is on a Medtronic 770 G pump prescribed by Endocrinology She denies any malfunctioning of the pump. Current Insulin pump settings:  Basal rate:  12 am: 2.45 units/kg  3 am: 2.20 units/kg  7: 00 am: 3.30 units/kg  15 :00 pm: 3.5 units/kg  22:00 pm: 2.80 units/kg    Insulin to carb ratio:  12 am: 5  6 am: 2.5  10 pm: 5.0  Insulin sensitivity factor:  12 am: 18  6 am: 10  23:00: 16  BG target:100 mg/dl    Type of insulin:Novolog  Active Insulin Time: 4    Discussed with patient in case of malfunctioning of the pump to use basal and bolus therapy as backup which is prescribed to the patient. Also notified patient to call clinic if any issues. Tanya Leger   Device used,Dexcom G6  Home use       Indication   Type 1 Diabetes      More than 72 hours of data was reviewed. Report to be scanned to chart.      Date Range: October 18 - October 31    Analysis of data:   Average Glucose: 186 mg/dl  Coefficient of Variation: 43.6%   SD : x   Time in Target Range: 51%   Time Above Range: 48%   Time Below Range: 1     Hypoglycemic episodes:   H/o of hypoglycemia causing hospitalization or Intervention such as glucagon injection  or ambulance call :no   Hypoglycemia symptoms:dizzy, shaky   Treatment of hypoglycemia: juice box              Opthamology: UTD  Podiatry: no      Has hypertension: followed by PCP; on Lisinopril   Has hyperlipidemia: followed by PCP; on Crestor 40 mg daily - tolerating well, no myalgias. Thyroid disorders: Grave's disease, currently not on medication, stopped Methimazole July 2022. She has history of Grave's eye disease, she was following with [facility], and did not needed treatment. History of thyroid nodule, most recent thyroid ultrasound in 2022 showed 1.1x0.6x0.8 cm LEFT lower pole,  history of FNA with benign result. History of pancreatitis: no,     Patient Active Problem List   Diagnosis    NSTEMI (non-ST elevated myocardial infarction)    Diabetes mellitus ([address])    Essential hypertension    Fibromyalgia    Morbid obesity    Hyperlipidemia    Anxiety    Tick bites/exposure    Coronary artery disease involving native coronary artery of native heart without angina pectoris    Graves disease    Hyperthyroidism    Esophageal reflux    Sleep apnea      Past Medical History:   Diagnosis Date    Diabetes mellitus ([address])     Graves disease     High cholesterol     Hypertension       Past Surgical History:   Procedure Laterality Date    ABDOMINAL SURGERY      Gallbladder removal    CARDIAC CATHETERIZATION Left 7/5/2023    Procedure: Cardiac Left Heart Cath;  Surgeon: Qamar Kelley DO;  Location: [facility]; Service: Cardiology    CARDIAC CATHETERIZATION N/A 7/5/2023    Procedure: Cardiac Coronary Angiogram;  Surgeon: Qamar Kelley DO;  Location: [facility]; Service: Cardiology    HYSTERECTOMY      US GUIDED THYROID BIOPSY  10/4/2018      No family history on file.   Social History     Tobacco Use    Smoking status: Never    Smokeless tobacco: Never   Substance Use Topics    Alcohol use: Never     Allergies   Allergen Reactions    Percocet [Oxycodone-Acetaminophen] Vomiting    Victoza [Liraglutide] GI Intolerance         Current Outpatient Medications:     ALPRAZolam (XANAX) 1 mg tablet, Take 1 mg by mouth daily at bedtime as needed for anxiety Pt unable to verify dose for RN, Disp: , Rfl:     aspirin (ECOTRIN LOW STRENGTH) 81 mg EC tablet, Take 1 tablet (81 mg total) by mouth daily Do not start before July 7, 2023., Disp: , Rfl: 0    cholecalciferol (VITAMIN D3) 1,000 units tablet, Take 2,000 Units by mouth daily Pt unable to verify dose for RN, Disp: , Rfl:     DULoxetine (CYMBALTA) 30 mg delayed release capsule, Take 30 mg by mouth daily Pt unable to verify dose for RN, Disp: , Rfl:     Empagliflozin (Jardiance) 25 MG TABS, Take 1 tablet (25 mg total) by mouth every morning, Disp: 90 tablet, Rfl: 3    HYDROcodone-acetaminophen (NORCO) 5-325 mg per tablet, Take 1 tablet by mouth every 6 (six) hours as needed for pain Pt unable to verify dose for RN, Disp: , Rfl:     hydrOXYzine HCL (ATARAX) 25 mg tablet, Take 25 mg by mouth every 6 (six) hours as needed for itching Pt unable to verify dose for RN, Disp: , Rfl:     lisinopril-hydrochlorothiazide (PRINZIDE,ZESTORETIC) 10-12.5 MG per tablet, Take 1 tablet by mouth daily, Disp: 90 tablet, Rfl: 3    metoprolol tartrate (LOPRESSOR) 25 mg tablet, Take 0.5 tablets (12.5 mg total) by mouth every 12 (twelve) hours, Disp: 90 tablet, Rfl: 3    nitroglycerin (NITROSTAT) 0.4 mg SL tablet, Place 1 tablet (0.4 mg total) under the tongue every 5 (five) minutes as needed for chest pain, Disp: 30 tablet, Rfl: 2    omeprazole (PriLOSEC) 20 mg delayed release capsule, Take 20 mg by mouth daily Pt unable to verify dose for RN, Disp: , Rfl:     omeprazole (PriLOSEC) 20 mg delayed release capsule, Take 20 mg by mouth daily, Disp: , Rfl:     PATIENT MAINTAINED INSULIN PUMP, Inject 1 each under the skin every 8 (eight) hours, Disp: , Rfl:     rosuvastatin (CRESTOR) 40 MG tablet, Take 1 tablet (40 mg total) by mouth daily, Disp: 90 tablet, Rfl: 3    ticagrelor (BRILINTA) 90 MG, Take 1 tablet (90 mg total) by mouth every 12 (twelve) hours, Disp: 180 tablet, Rfl: 11  Review of Systems    Physical Exam:  There is no height or weight on file to calculate BMI. There were no vitals taken for this visit.    Wt Readings from Last 3 Encounters:   09/07/23 110 kg (243 lb)   08/25/23 110 kg (243 lb)   07/12/23 109 kg (240 lb 14.4 oz)       GEN: NAD  E/n/m nl facies,   Eyes: no stare or proptosis,   Neck: trachea midline, thyroid NT to palpation, nl in size, no nodules or neck masses noted, no cervical LAD  CV; heart reg rate s1s2 nl, no m/r/g appreciated, no TASNEEM  Resp: CTAB, good effort  Ab+BS  Neuro: no tremor, 2+ DTRs in BUE  MS: no c/c in digits, moves all 4 ext, nl muscle bulk, gait nl  Skin: warm and dry, no palmar erythema  Psych: nl mood and affect, no gross lapses in memory  Patient's shoes and socks removed. Right Foot/Ankle   Right Foot Inspection  Skin Exam: skin normal, callus and callus. Skin not intact, no dry skin, no warmth, no erythema, no maceration, no abnormal color, no pre-ulcer and no ulcer. Toe Exam: No swelling, no tenderness, erythema and  no right toe deformity    Sensory   Vibration: intact  Proprioception: intact  Monofilament testing: intact    Vascular  Capillary refills: < 3 seconds  The right DP pulse is 2+. The right PT pulse is 2+. Left Foot/Ankle  Left Foot Inspection  Skin Exam: skin normal and callus. Skin not intact, no dry skin, no warmth, no erythema, no maceration, normal color, no pre-ulcer and no ulcer. Toe Exam: No swelling, no tenderness, no erythema and no left toe deformity. Sensory   Vibration: intact  Proprioception: intact  Monofilament testing: intact    Vascular  Capillary refills: < 3 seconds  The left DP pulse is 2+. The left PT pulse is 2+.      Assign Risk Category  No deformity present  No loss of protective sensation  No weak pulses  Risk: 0        Labs:   No components found for: "HA1C"  No components found for: "GLU"    Lab Results   Component Value Date    CREATININE 1.02 07/26/2023    CREATININE 0.99 07/06/2023    CREATININE 0.97 07/04/2023    BUN 11 07/26/2023    K 4.2 07/26/2023     07/26/2023    CO2 27 07/26/2023     eGFR   Date Value Ref Range Status 07/26/2023 66 ml/min/1.73sq m Final     No components found for: "MALBCRER"    Lab Results   Component Value Date    HDL 57 07/05/2023    TRIG 73 07/05/2023       Lab Results   Component Value Date    ALT 34 07/26/2023    AST 23 07/26/2023    ALKPHOS 75 07/26/2023       No results found for: "TSH", "FREET4", "TSI"    Impression:  1. Type 1 diabetes mellitus without complication ([address])    2. Hyperthyroidism    3. Essential hypertension    4. Mixed hyperlipidemia    5. Morbid obesity    6. Graves disease           Plan:    Diagnoses and all orders for this visit:    Type 1 diabetes mellitus without complication ([address]):  Suboptimally controlled with A1C of 8.4 %, we reviewed pump and CGM report, she has daytime hyperglycemia and tightly controlled overnight. She is not putting carb amount in her pump enough and bolus amount is inadequate causing post prandial hyperglycemia. I decreased overnight basal to  2.2 unit/hr at 12 am and to 2.0 units/hr for 3 am.    counseled on adverse side effects of GLP-1 agonist including nausea, vomiting, pancreatitis, medullary thyroid cancer. No FHx of MEN2. She understood these risks and wished to start therapy. I started Ozempic 0.25 once weekly for 4 weeks and then will increase to 0.5 mg once weekly, please call the office if you develop any side effects. Cardiology team started Jardiance, given recent history of CAD, and studies showed significant MACE with SGLT-2 inhibitors. However use of this group of medications in type 1 diabetes, particularly in her case with history of DKA, should be reconsidered. Return back in 2 months. Labs prior to next visit. -     POCT hemoglobin A1c  -     Diabetic foot exam; Future  -     Ambulatory referral to Ophthalmology; Future  -     Ambulatory referral to Podiatry; Future  -     semaglutide, 0.25 or 0.5 mg/dose, (Ozempic, 0.25 or 0.5 MG/DOSE,) 2 mg/3 mL injection pen;  Inject 0.375 mL (0.25 mg total) under the skin every 7 days for 30 days, THEN 0.75 mL (0.5 mg total) every 7 days. -     Discontinue: Insulin Aspart (NovoLOG) 100 units/mL injection; To be used in insulin pump approximately 120 units a day    Hyperthyroidism due to Grave's disease:    currently not on medication, stopped Methimazole July 2022. She has history of Grave's eye disease, she was following with [facility], and did not needed treatment. Repeat TSH and Free T4.  -     TSH, 3rd generation with Free T4 reflex; Future  -     T4, free Lab Collect; Future    Essential hypertension:  Controlled. -     Comprehensive metabolic panel; Future  -     Albumin / creatinine urine ratio; Future    Mixed hyperlipidemia:  Continue Crestor    -     Comprehensive metabolic panel; Future  -     Lipid Panel with Direct LDL reflex; Future    Morbid obesity:  Emphasized importance of daily exercise, weight loss, caloric reduction, small meals, consumption of multiple servings of fruits and vegetables and avoidance of concentrated sweets such as juice and soda. I started Ozempic 0.25 once weekly for 4 weeks and then will increase to 0.5 mg once weekly, please call the office if you develop any side effects. Discussed with the patient and all questioned fully answered. She will call me if any problems arise.     Counseled patient on diagnostic results, prognosis, risk and benefit of treatment options, instruction for management, importance of treatment compliance, Risk  factor reduction and impressions      Anibal Posey MD

## 2023-10-31 NOTE — PATIENT INSTRUCTIONS
We decreased basal rate at 12 to 2.2 unit/hr and to 2.0 units/hr for 3 am    I started Ozempic 0.25 once weekly for 4 weeks and then will increase to 0.5 mg once weekly, please call the office if you develop any side effects.

## 2023-11-03 ENCOUNTER — TELEPHONE (OUTPATIENT)
Dept: ENDOCRINOLOGY | Facility: CLINIC | Age: 48
End: 2023-11-03

## 2023-11-10 ENCOUNTER — TELEPHONE (OUTPATIENT)
Age: 48
End: 2023-11-10

## 2023-11-10 ENCOUNTER — APPOINTMENT (OUTPATIENT)
Dept: RADIOLOGY | Facility: CLINIC | Age: 48
End: 2023-11-10
Payer: COMMERCIAL

## 2023-11-10 ENCOUNTER — OFFICE VISIT (OUTPATIENT)
Dept: PODIATRY | Facility: CLINIC | Age: 48
End: 2023-11-10
Payer: COMMERCIAL

## 2023-11-10 VITALS
WEIGHT: 254 LBS | BODY MASS INDEX: 43.36 KG/M2 | SYSTOLIC BLOOD PRESSURE: 134 MMHG | HEART RATE: 78 BPM | DIASTOLIC BLOOD PRESSURE: 64 MMHG | HEIGHT: 64 IN

## 2023-11-10 DIAGNOSIS — M79.672 BILATERAL FOOT PAIN: ICD-10-CM

## 2023-11-10 DIAGNOSIS — M79.671 BILATERAL FOOT PAIN: Primary | ICD-10-CM

## 2023-11-10 DIAGNOSIS — E10.9 TYPE 1 DIABETES MELLITUS WITHOUT COMPLICATION (HCC): ICD-10-CM

## 2023-11-10 DIAGNOSIS — M79.672 BILATERAL FOOT PAIN: Primary | ICD-10-CM

## 2023-11-10 DIAGNOSIS — G57.93 NEUROPATHIC PAIN OF BOTH FEET: ICD-10-CM

## 2023-11-10 DIAGNOSIS — M79.671 BILATERAL FOOT PAIN: ICD-10-CM

## 2023-11-10 PROCEDURE — 99203 OFFICE O/P NEW LOW 30 MIN: CPT | Performed by: STUDENT IN AN ORGANIZED HEALTH CARE EDUCATION/TRAINING PROGRAM

## 2023-11-10 PROCEDURE — 73630 X-RAY EXAM OF FOOT: CPT

## 2023-11-10 RX ORDER — CAPSAICIN 0.025 %
1 CREAM (GRAM) TOPICAL ONCE
Qty: 120 G | Refills: 2 | Status: SHIPPED | OUTPATIENT
Start: 2023-11-10 | End: 2023-11-10

## 2023-11-10 NOTE — PROGRESS NOTES
Assessment/Plan:    No problem-specific Assessment & Plan notes found for this encounter. Diagnoses and all orders for this visit:    Bilateral foot pain  -     X-ray foot right 3+ views; Future  -     X-ray foot left 3+ views; Future    Type 1 diabetes mellitus without complication (HCC)  -     Ambulatory referral to Podiatry  -     capsaicin (ZOSTRIX) 0.025 % cream; Apply 1 Application topically 1 (one) time for 1 dose    Neuropathic pain of both feet  -     capsaicin (ZOSTRIX) 0.025 % cream; Apply 1 Application topically 1 (one) time for 1 dose        Plan:     - Patient was counseled and educated on the condition and the diagnosis. The diagnosis, treatment options and prognosis were discussed in detail.   -Bilateral foot x-rays reviewed, I personally reviewed x-ray finding with patient which is consistent without any acute osseous abnormalities. Await final radiology read we will update patient as necessary.  -I suspect bilateral foot pain secondary to diabetic neuropathy. I recommended topical capsaicin cream for neuropathic pain, patient can trial gabapentin under endocrinology guidance if pain does not resolve with the topical medication. We discussed importance of glycemic control, daily feet checks and proper shoe gear. - Patient was instructed to use a pumice stone at home to reduce the growth of the callus  on feet as well as OTC lotion to their feet. -Recent endocrinologist notes reviewed  -Addressed all questions and concerns  -Return in 3 months for reevaluation      Diabetic Foot Exam    Patient's shoes and socks removed. Right Foot/Ankle   Right Foot Inspection  Skin Exam: dry skin, callus and callus. Toe Exam: tenderness. Sensory   Vibration: intact  Proprioception: intact  Monofilament testing: intact    Vascular  The right DP pulse is 2+. The right PT pulse is 2+. Left Foot/Ankle  Left Foot Inspection  Skin Exam: dry skin and callus. Toe Exam: tenderness. Sensory   Vibration: intact  Proprioception: intact  Monofilament testing: intact    Vascular  The left DP pulse is 2+. The left PT pulse is 2+. Assign Risk Category  No deformity present  No loss of protective sensation  No weak pulses  Risk: 0    Lab Results   Component Value Date    HGBA1C 9.2 (A) 10/31/2023         Subjective:      Patient ID: Nat Welch is a 57 y.o. female. 42-year-old female with past medical history as below presents for an evaluation of bilateral feet burning pain. Patient reports she has discomfort throughout her feet symmetrically. She denies any recent trauma. Patient reports this has been going on for years and has attributed to her type 1 diabetes. Patient was recently evaluated in endocrinologist and wanted to have podiatrist evaluate for foot pain and diabetic foot exam.  Reports she wears supportive sneakers. She has no other complaints. The following portions of the patient's history were reviewed and updated as appropriate: She  has a past medical history of Diabetes mellitus ([address]), Graves disease, High cholesterol, and Hypertension. She   Patient Active Problem List    Diagnosis Date Noted    Graves disease     Coronary artery disease involving native coronary artery of native heart without angina pectoris 08/25/2023    NSTEMI (non-ST elevated myocardial infarction) 07/04/2023    Diabetes mellitus ([address]) 07/04/2023    Essential hypertension 07/04/2023    Fibromyalgia 07/04/2023    Morbid obesity 07/04/2023    Hyperlipidemia 07/04/2023    Anxiety 07/04/2023    Tick bites/exposure 07/04/2023    Hyperthyroidism 04/19/2018    Sleep apnea 10/29/2010    Esophageal reflux 06/28/2010     She  has a past surgical history that includes Hysterectomy; Abdominal surgery; Cardiac catheterization (Left, 7/5/2023); Cardiac catheterization (N/A, 7/5/2023); and US guided thyroid biopsy (10/4/2018).   Current Outpatient Medications   Medication Sig Dispense Refill ALPRAZolam (XANAX) 1 mg tablet Take 1 mg by mouth daily at bedtime as needed for anxiety Pt unable to verify dose for RN      aspirin (ECOTRIN LOW STRENGTH) 81 mg EC tablet Take 1 tablet (81 mg total) by mouth daily Do not start before July 7, 2023.  0    atorvastatin (LIPITOR) 40 mg tablet       capsaicin (ZOSTRIX) 0.025 % cream Apply 1 Application topically 1 (one) time for 1 dose 120 g 2    cholecalciferol (VITAMIN D3) 1,000 units tablet Take 2,000 Units by mouth daily Pt unable to verify dose for RN      DULoxetine (CYMBALTA) 30 mg delayed release capsule Take 30 mg by mouth daily Pt unable to verify dose for RN      HYDROcodone-acetaminophen (NORCO) 5-325 mg per tablet Take 1 tablet by mouth every 6 (six) hours as needed for pain Pt unable to verify dose for RN      hydrOXYzine HCL (ATARAX) 25 mg tablet Take 25 mg by mouth every 6 (six) hours as needed for itching Pt unable to verify dose for RN      Insulin Aspart (NovoLOG) 100 units/mL injection To be used in insulin pump approximately 120 units a day 110 mL 0    lisinopril-hydrochlorothiazide (PRINZIDE,ZESTORETIC) 10-12.5 MG per tablet Take 1 tablet by mouth daily 90 tablet 3    metoprolol tartrate (LOPRESSOR) 25 mg tablet Take 0.5 tablets (12.5 mg total) by mouth every 12 (twelve) hours 90 tablet 3    nitroglycerin (NITROSTAT) 0.4 mg SL tablet Place 1 tablet (0.4 mg total) under the tongue every 5 (five) minutes as needed for chest pain 30 tablet 2    omeprazole (PriLOSEC) 20 mg delayed release capsule Take 20 mg by mouth daily Pt unable to verify dose for RN      omeprazole (PriLOSEC) 20 mg delayed release capsule Take 20 mg by mouth daily      PATIENT MAINTAINED INSULIN PUMP Inject 1 each under the skin every 8 (eight) hours      rosuvastatin (CRESTOR) 40 MG tablet Take 1 tablet (40 mg total) by mouth daily 90 tablet 3    semaglutide, 0.25 or 0.5 mg/dose, (Ozempic, 0.25 or 0.5 MG/DOSE,) 2 mg/3 mL injection pen Inject 0.375 mL (0.25 mg total) under the skin every 7 days for 30 days, THEN 0.75 mL (0.5 mg total) every 7 days. 9 mL 0    ticagrelor (BRILINTA) 90 MG Take 1 tablet (90 mg total) by mouth every 12 (twelve) hours 180 tablet 11    Empagliflozin (Jardiance) 25 MG TABS Take 1 tablet (25 mg total) by mouth every morning (Patient not taking: Reported on 10/31/2023) 90 tablet 3     No current facility-administered medications for this visit. .    Review of Systems   Constitutional:  Negative for chills. Respiratory:  Negative for shortness of breath. Gastrointestinal:  Negative for vomiting. Musculoskeletal:  Positive for arthralgias. Skin:  Negative for color change. Neurological:  Negative for dizziness. Psychiatric/Behavioral:  Negative for agitation. Objective:      /64 (BP Location: Left arm, Patient Position: Sitting, Cuff Size: Large)   Pulse 78   Ht 5' 4" (1.626 m)   Wt 115 kg (254 lb) Comment: verbal  BMI 43.60 kg/m²          Physical Exam  Vitals reviewed. Constitutional:       Appearance: She is obese. Cardiovascular:      Rate and Rhythm: Normal rate. Pulses: Normal pulses. no weak pulses          Dorsalis pedis pulses are 2+ on the right side and 2+ on the left side. Posterior tibial pulses are 2+ on the right side and 2+ on the left side. Musculoskeletal:         General: Tenderness present. Right foot: Tenderness present. No deformity. Left foot: Tenderness present. No deformity. Comments: Mild tenderness to touch noted throughout bilateral feet. Muscle manual test 5 out of 5. Feet:      Right foot:      Protective Sensation: 10 sites tested. 10 sites sensed. Skin integrity: Callus and dry skin present. Toenail Condition: Right toenails are normal.      Left foot:      Protective Sensation: 10 sites tested. 10 sites sensed. Skin integrity: Callus and dry skin present. Toenail Condition: Left toenails are normal.   Skin:     General: Skin is warm. Neurological:      General: No focal deficit present. Mental Status: She is alert.    Psychiatric:         Mood and Affect: Mood normal.

## 2023-11-10 NOTE — TELEPHONE ENCOUNTER
PA REQUEST SUBMITTED TO PLAN FOR COVERAGE REVIEW    SURE SCRIPTS QUESTIONS ANSWERED    WILL AWAIT PLAN RESPONSE

## 2023-11-28 ENCOUNTER — TELEPHONE (OUTPATIENT)
Dept: ENDOCRINOLOGY | Facility: CLINIC | Age: 48
End: 2023-11-28

## 2023-11-28 NOTE — TELEPHONE ENCOUNTER
Pt left voicemail requesting a call back regarding Ozempic. Pt stated it can go to [facility] but thinks it would need a P/A.

## 2023-11-29 ENCOUNTER — TELEPHONE (OUTPATIENT)
Dept: ENDOCRINOLOGY | Facility: CLINIC | Age: 48
End: 2023-11-29

## 2023-11-29 NOTE — TELEPHONE ENCOUNTER
Hugo Mckee left message that she is waiting to get back a CMN that was faxed on 11/22 for [employer]mitter [employer].   Can be faxed to [phone]

## 2023-12-04 ENCOUNTER — TELEPHONE (OUTPATIENT)
Dept: ENDOCRINOLOGY | Facility: CLINIC | Age: 48
End: 2023-12-04

## 2023-12-06 ENCOUNTER — TELEPHONE (OUTPATIENT)
Dept: ENDOCRINOLOGY | Facility: CLINIC | Age: 48
End: 2023-12-06

## 2023-12-06 NOTE — TELEPHONE ENCOUNTER
Spoke to [employer] they need 30 days of cgm report/numbers, called patient she will stop by the office Friday or early next week to do a quick download. Once complete we need to fax to [phone].

## 2023-12-13 NOTE — TELEPHONE ENCOUNTER
Sergio Ruvalcaba from [employer] calling back to check on the status of 30 day blood sugar levels. Patient has not come in for download yet. Spoke with patient and she will either stop out today or Friday.  [employer] is waiting for this information prior to shipping out order for 780G pump and transmitter

## 2023-12-15 ENCOUNTER — TELEPHONE (OUTPATIENT)
Dept: ENDOCRINOLOGY | Facility: CLINIC | Age: 48
End: 2023-12-15

## 2023-12-18 ENCOUNTER — TELEPHONE (OUTPATIENT)
Dept: ENDOCRINOLOGY | Facility: CLINIC | Age: 48
End: 2023-12-18

## 2023-12-27 ENCOUNTER — TELEPHONE (OUTPATIENT)
Dept: ENDOCRINOLOGY | Facility: CLINIC | Age: 48
End: 2023-12-27

## 2023-12-27 NOTE — TELEPHONE ENCOUNTER
Called and spoke with patient letting her know she can go on line and find the appeal form for [employer] or she could call them to get the appeal started

## 2023-12-28 NOTE — TELEPHONE ENCOUNTER
Chitra from [facility] left voicemail message regarding Ozempic denial.  Chitra stated on message that there needs to be a letter why for appealing.    Faxed to [phone]

## 2023-12-28 NOTE — TELEPHONE ENCOUNTER
Patient calling in regards to the Ozempic denial and appeal. Patient would like to note for appeal purposes that she is insulin resistant and that she does have hx of heart attack 7/2023 with stent placement.

## 2024-01-05 NOTE — TELEPHONE ENCOUNTER
Patient calling back to check on the status of Ozempic appeal. Patient asking for a call back by end of day today #[phone]

## 2024-01-09 ENCOUNTER — OFFICE VISIT (OUTPATIENT)
Dept: URGENT CARE | Age: 49
End: 2024-01-09
Payer: COMMERCIAL

## 2024-01-09 VITALS
DIASTOLIC BLOOD PRESSURE: 70 MMHG | HEART RATE: 80 BPM | OXYGEN SATURATION: 97 % | RESPIRATION RATE: 20 BRPM | TEMPERATURE: 96.2 F | SYSTOLIC BLOOD PRESSURE: 158 MMHG

## 2024-01-09 DIAGNOSIS — R06.2 WHEEZING: ICD-10-CM

## 2024-01-09 DIAGNOSIS — R06.02 SHORTNESS OF BREATH: ICD-10-CM

## 2024-01-09 DIAGNOSIS — J20.9 ACUTE BRONCHITIS, UNSPECIFIED ORGANISM: Primary | ICD-10-CM

## 2024-01-09 LAB — GLUCOSE SERPL-MCNC: 155 MG/DL (ref 65–140)

## 2024-01-09 PROCEDURE — 99213 OFFICE O/P EST LOW 20 MIN: CPT | Performed by: EMERGENCY MEDICINE

## 2024-01-09 PROCEDURE — 82948 REAGENT STRIP/BLOOD GLUCOSE: CPT | Performed by: EMERGENCY MEDICINE

## 2024-01-09 RX ORDER — ALBUTEROL SULFATE 2.5 MG/3ML
2.5 SOLUTION RESPIRATORY (INHALATION) ONCE
Status: COMPLETED | OUTPATIENT
Start: 2024-01-09 | End: 2024-01-09

## 2024-01-09 RX ORDER — ALBUTEROL SULFATE 90 UG/1
4 AEROSOL, METERED RESPIRATORY (INHALATION) EVERY 4 HOURS PRN
Qty: 8.5 G | Refills: 3 | Status: SHIPPED | OUTPATIENT
Start: 2024-01-09 | End: 2024-02-08

## 2024-01-09 RX ADMIN — Medication 0.5 MG: at 11:18

## 2024-01-09 RX ADMIN — ALBUTEROL SULFATE 2.5 MG: 2.5 SOLUTION RESPIRATORY (INHALATION) at 11:17

## 2024-01-09 NOTE — LETTER
January 9, 2024     Patient: Naina Jamison   YOB: 1975   Date of Visit: 1/9/2024       To Whom it May Concern:    Naina Jamison was seen in my clinic on 1/9/2024. She may return to work on 1/12/24 .    If you have any questions or concerns, please don't hesitate to call.         Sincerely,          Eliazar Mayer,         CC: No Recipients

## 2024-01-09 NOTE — PROGRESS NOTES
[facility]        NAME: Naina Jamison is a 48 y.o. female  : 1975    MRN: [id number]  DATE: 2024  TIME: 10:19 AM    Assessment and Plan   Wheezing [R06.2]  1. Wheezing  ECG 12 lead    ipratropium (ATROVENT) 0.02 % inhalation solution 0.5 mg    albuterol inhalation solution 2.5 mg      2. Acute cough        3. Shortness of breath          -Patient without tachypnea, accessory muscle use or hypoxia in clinic; there is no oropharyngeal swelling, stridor or urticarial rash suggestive of anaphylaxis and she denies any gastrointestinal symptoms.  -COVID-negative in clinic, however symptoms are likely secondary to viral bronchitis  -Twelve-lead EKG performed in clinic revealed sinus rhythm at a ventricular rate of 73 with otherwise normal axis, intervals and no acute ischemic ST-T wave changes  -Blood glucose level in clinic approximately 155 mg/dL, patient does not appear in DKA at this time  -Patient patient states that her wheezing and shortness of breath have greatly improved after DuoNeb administration.  She was able to ambulate in the clinic without respiratory distress or hypoxia.  Her EKG was without ischemic changes and she was otherwise hemodynamically stable.  Will discharge patient home with refill of her albuterol inhaler with instructions for use; advised patient if she develops worsening or persistent symptoms despite inhaler use to immediately seek care in the emergency room, otherwise follow-up with her PCP as directed or within the next 3 to 5 days for reevaluation.  All questions were answered at bedside, patient was amenable to plan and voiced understanding.    Patient Instructions       Follow up with PCP in 3-5 days.  Proceed to  ER if symptoms worsen.    Chief Complaint     Chief Complaint   Patient presents with    Shortness of Breath     Shortness of breath and wheezing staring yesterday. Friday started sinus congestion. Tried using inhaler from previous, did not  help. Body aches, headache,          History of Present Illness       48-year-old female with history of type I DM currently controlled with insulin pump and monitors with Dexcom, CAD status post KSENIA to the LAD in July 2023, Graves' disease, GERD, hypertension, hyperlipidemia presents with a chief complaint of dry nonproductive cough, generalized bodyaches, postnasal drip, clear rhinorrhea and sore throat with associated fever of approximately 101 °F which began approximately 2 days ago.  Patient states that yesterday evening she started to develop intermittent wheezing and shortness of breath with associated chest tightness.  She states that she does not have a history of asthma or COPD and has never had prolonged or heavy cigarette or tobacco use; she states that she had a similar episode last year with wheezing in response to viral illness which resolved after albuterol.  She states that she does experience intermittent chest tightness with coughing and wheezing which subsides in the interim.  She denies any overt chest pressure, pain, dyspnea on exertion, leg swelling, lightheadedness, diaphoresis, nausea vomiting or diarrhea.  States that the tightness in her chest does not feel the same as her previous MI.  She has been monitoring her blood sugars closely via her Dexcom at home and states that they have been relatively well-controlled.  She denies any urticaria or other rashes or any new medications, foods, substances, laundry detergents or clothing.  Denies a prior history of anaphylaxis.    Shortness of Breath  The current episode started yesterday. The problem occurs intermittently. The problem has been waxing and waning since onset. The problem is moderate. Associated symptoms include coughing, rhinorrhea and wheezing. Pertinent negatives include no chest pain, chest pressure, dizziness, fatigue, hoarseness of voice, leg swelling, orthopnea, palpitations, sore throat, stridor or sweats. Nothing aggravates  the symptoms. She has had no prior steroid use. Past treatments include beta-agonist inhalers. The treatment provided mild relief. There is no history of allergies, anxiety/panic attacks, asthma or DVT.       Review of Systems   Review of Systems   Constitutional:  Negative for activity change, appetite change, chills, diaphoresis, fatigue, fever and unexpected weight change.   HENT:  Positive for congestion, postnasal drip, rhinorrhea, sinus pressure and sinus pain. Negative for dental problem, drooling, ear discharge, ear pain, facial swelling, hearing loss, hoarse voice, mouth sores, nosebleeds, sneezing, sore throat, tinnitus, trouble swallowing and voice change.    Eyes:  Negative for pain and visual disturbance.   Respiratory:  Positive for cough, chest tightness, shortness of breath and wheezing. Negative for apnea, choking and stridor.    Cardiovascular:  Negative for chest pain, palpitations, orthopnea and leg swelling.   Gastrointestinal:  Negative for abdominal distention, abdominal pain, anal bleeding, blood in stool, constipation, diarrhea, nausea, rectal pain and vomiting.   Genitourinary:  Negative for decreased urine volume, difficulty urinating, dyspareunia, dysuria, enuresis, flank pain, frequency, genital sores, hematuria, menstrual problem, pelvic pain, urgency, vaginal bleeding, vaginal discharge and vaginal pain.   Musculoskeletal:  Negative for arthralgias, back pain, gait problem, joint swelling, myalgias, neck pain and neck stiffness.   Skin:  Negative for color change and rash.   Neurological:  Negative for dizziness, tremors, seizures, syncope, facial asymmetry, speech difficulty, weakness, light-headedness, numbness and headaches.   All other systems reviewed and are negative.        Current Medications       Current Outpatient Medications:     ALPRAZolam (XANAX) 1 mg tablet, Take 1 mg by mouth daily at bedtime as needed for anxiety Pt unable to verify dose for RN, Disp: , Rfl:      aspirin (ECOTRIN LOW STRENGTH) 81 mg EC tablet, Take 1 tablet (81 mg total) by mouth daily Do not start before July 7, 2023., Disp: , Rfl: 0    atorvastatin (LIPITOR) 40 mg tablet, , Disp: , Rfl:     cholecalciferol (VITAMIN D3) 1,000 units tablet, Take 2,000 Units by mouth daily Pt unable to verify dose for RN, Disp: , Rfl:     DULoxetine (CYMBALTA) 30 mg delayed release capsule, Take 30 mg by mouth daily Pt unable to verify dose for RN, Disp: , Rfl:     HYDROcodone-acetaminophen (NORCO) 5-325 mg per tablet, Take 1 tablet by mouth every 6 (six) hours as needed for pain Pt unable to verify dose for RN, Disp: , Rfl:     hydrOXYzine HCL (ATARAX) 25 mg tablet, Take 25 mg by mouth every 6 (six) hours as needed for itching Pt unable to verify dose for RN, Disp: , Rfl:     Insulin Aspart (NovoLOG) 100 units/mL injection, To be used in insulin pump approximately 120 units a day, Disp: 110 mL, Rfl: 0    lisinopril-hydrochlorothiazide (PRINZIDE,ZESTORETIC) 10-12.5 MG per tablet, Take 1 tablet by mouth daily, Disp: 90 tablet, Rfl: 3    metoprolol tartrate (LOPRESSOR) 25 mg tablet, Take 0.5 tablets (12.5 mg total) by mouth every 12 (twelve) hours, Disp: 90 tablet, Rfl: 3    nitroglycerin (NITROSTAT) 0.4 mg SL tablet, Place 1 tablet (0.4 mg total) under the tongue every 5 (five) minutes as needed for chest pain, Disp: 30 tablet, Rfl: 2    omeprazole (PriLOSEC) 20 mg delayed release capsule, Take 20 mg by mouth daily Pt unable to verify dose for RN, Disp: , Rfl:     omeprazole (PriLOSEC) 20 mg delayed release capsule, Take 20 mg by mouth daily, Disp: , Rfl:     PATIENT MAINTAINED INSULIN PUMP, Inject 1 each under the skin every 8 (eight) hours, Disp: , Rfl:     rosuvastatin (CRESTOR) 40 MG tablet, Take 1 tablet (40 mg total) by mouth daily, Disp: 90 tablet, Rfl: 3    semaglutide, 0.25 or 0.5 mg/dose, (Ozempic, 0.25 or 0.5 MG/DOSE,) 2 mg/3 mL injection pen, Inject 0.375 mL (0.25 mg total) under the skin every 7 days for 30 days,  THEN 0.75 mL (0.5 mg total) every 7 days., Disp: 9 mL, Rfl: 0    ticagrelor (BRILINTA) 90 MG, Take 1 tablet (90 mg total) by mouth every 12 (twelve) hours, Disp: 180 tablet, Rfl: 11    capsaicin (ZOSTRIX) 0.025 % cream, Apply 1 Application topically 1 (one) time for 1 dose, Disp: 120 g, Rfl: 2    Empagliflozin (Jardiance) 25 MG TABS, Take 1 tablet (25 mg total) by mouth every morning (Patient not taking: Reported on 10/31/2023), Disp: 90 tablet, Rfl: 3    Current Facility-Administered Medications:     albuterol inhalation solution 2.5 mg, 2.5 mg, Nebulization, Once, Eliazar Mayer DO    ipratropium (ATROVENT) 0.02 % inhalation solution 0.5 mg, 0.5 mg, Nebulization, Once, Eliazar Mayer,     Current Allergies     Allergies as of 01/09/2024 - Reviewed 01/09/2024   Allergen Reaction Noted    Milnacipran GI Intolerance 03/10/2017    Percocet [oxycodone-acetaminophen] Vomiting 07/04/2023    Pregabalin Other (See Comments) 03/10/2017    Victoza [liraglutide] GI Intolerance 07/04/2023            The following portions of the patient's history were reviewed and updated as appropriate: allergies, current medications, past family history, past medical history, past social history, past surgical history and problem list.     Past Medical History:   Diagnosis Date    Diabetes mellitus (HCC)     Graves disease     High cholesterol     Hypertension        Past Surgical History:   Procedure Laterality Date    ABDOMINAL SURGERY      Gallbladder removal    CARDIAC CATHETERIZATION Left 7/5/2023    Procedure: Cardiac Left Heart Cath;  Surgeon: Elba Nevarez DO;  Location: [facility];  Service: Cardiology    CARDIAC CATHETERIZATION N/A 7/5/2023    Procedure: Cardiac Coronary Angiogram;  Surgeon: Elba Nevarez DO;  Location: [facility];  Service: Cardiology    HYSTERECTOMY      US GUIDED THYROID BIOPSY  10/4/2018       No family history on file.      Medications have been  verified.        Objective   /70   Pulse 80   Temp (!) 96.2 °F (35.7 °C)   Resp 20   SpO2 97%   No LMP recorded. Patient has had a hysterectomy.       Physical Exam     Physical Exam  Vitals and nursing note reviewed.   Constitutional:       General: She is not in acute distress.     Appearance: Normal appearance. She is normal weight. She is not ill-appearing, toxic-appearing or diaphoretic.      Interventions: She is not intubated.  HENT:      Head: Normocephalic and atraumatic.      Right Ear: External ear normal.      Left Ear: External ear normal.      Nose: Congestion and rhinorrhea present.      Mouth/Throat:      Mouth: Mucous membranes are moist.      Pharynx: Posterior oropharyngeal erythema present. No pharyngeal swelling or oropharyngeal exudate.   Eyes:      General:         Right eye: No discharge.         Left eye: No discharge.      Extraocular Movements: Extraocular movements intact.      Conjunctiva/sclera: Conjunctivae normal.      Pupils: Pupils are equal, round, and reactive to light.   Neck:      Thyroid: No thyromegaly.      Vascular: No hepatojugular reflux or JVD.      Trachea: No tracheal deviation.   Cardiovascular:      Rate and Rhythm: Normal rate and regular rhythm. No extrasystoles are present.     Pulses: Normal pulses. No decreased pulses.      Heart sounds: Normal heart sounds. No murmur heard.     No friction rub. No gallop.   Pulmonary:      Effort: Pulmonary effort is normal. No tachypnea, bradypnea, accessory muscle usage or respiratory distress. She is not intubated.      Breath sounds: No stridor. Examination of the right-middle field reveals wheezing. Examination of the left-middle field reveals wheezing. Examination of the right-lower field reveals wheezing. Examination of the left-lower field reveals wheezing. Wheezing present. No rhonchi or rales.   Chest:      Chest wall: No mass, deformity, tenderness, crepitus or edema. There is no dullness to percussion.    Abdominal:      General: There is no distension.      Palpations: There is no hepatomegaly, splenomegaly or mass.      Tenderness: There is no abdominal tenderness. There is no right CVA tenderness, left CVA tenderness, guarding or rebound.      Hernia: No hernia is present.   Musculoskeletal:      Cervical back: Normal range of motion and neck supple.      Right lower leg: No tenderness. No edema.      Left lower leg: No tenderness. No edema.   Lymphadenopathy:      Cervical: No cervical adenopathy.   Skin:     Coloration: Skin is not cyanotic, jaundiced or pale.      Findings: No bruising, ecchymosis, erythema, lesion or rash.   Neurological:      General: No focal deficit present.      Mental Status: She is alert and oriented to person, place, and time.      Cranial Nerves: No cranial nerve deficit.      Sensory: No sensory deficit.      Motor: No weakness.      Coordination: Coordination normal.      Gait: Gait normal.      Deep Tendon Reflexes: Reflexes normal.   Psychiatric:         Mood and Affect: Mood normal.         Behavior: Behavior normal.

## 2024-01-18 ENCOUNTER — PATIENT MESSAGE (OUTPATIENT)
Dept: ENDOCRINOLOGY | Facility: CLINIC | Age: 49
End: 2024-01-18

## 2024-01-26 DIAGNOSIS — E10.9 TYPE 1 DIABETES MELLITUS WITHOUT COMPLICATION (HCC): ICD-10-CM

## 2024-01-26 RX ORDER — INSULIN ASPART 100 [IU]/ML
INJECTION, SOLUTION INTRAVENOUS; SUBCUTANEOUS
Qty: 110 ML | Refills: 0 | Status: SHIPPED | OUTPATIENT
Start: 2024-01-26

## 2024-02-01 DIAGNOSIS — E66.01 CLASS 3 SEVERE OBESITY DUE TO EXCESS CALORIES WITH SERIOUS COMORBIDITY AND BODY MASS INDEX (BMI) OF 40.0 TO 44.9 IN ADULT (HCC): Primary | ICD-10-CM

## 2024-02-16 DIAGNOSIS — E66.01 CLASS 3 SEVERE OBESITY DUE TO EXCESS CALORIES WITH SERIOUS COMORBIDITY AND BODY MASS INDEX (BMI) OF 40.0 TO 44.9 IN ADULT (HCC): Primary | ICD-10-CM

## 2024-02-16 RX ORDER — TIRZEPATIDE 2.5 MG/.5ML
2.5 INJECTION, SOLUTION SUBCUTANEOUS WEEKLY
Qty: 2 ML | Refills: 1 | Status: SHIPPED | OUTPATIENT
Start: 2024-02-16 | End: 2024-04-12

## 2024-03-06 ENCOUNTER — OFFICE VISIT (OUTPATIENT)
Dept: CARDIOLOGY CLINIC | Facility: CLINIC | Age: 49
End: 2024-03-06
Payer: COMMERCIAL

## 2024-03-06 VITALS
OXYGEN SATURATION: 97 % | SYSTOLIC BLOOD PRESSURE: 122 MMHG | WEIGHT: 251 LBS | BODY MASS INDEX: 42.85 KG/M2 | HEIGHT: 64 IN | DIASTOLIC BLOOD PRESSURE: 72 MMHG | HEART RATE: 70 BPM

## 2024-03-06 DIAGNOSIS — E78.2 MIXED HYPERLIPIDEMIA: ICD-10-CM

## 2024-03-06 DIAGNOSIS — Q23.1 BICUSPID AORTIC VALVE: Primary | ICD-10-CM

## 2024-03-06 DIAGNOSIS — E10.59 TYPE 1 DIABETES MELLITUS WITH OTHER CIRCULATORY COMPLICATION (HCC): ICD-10-CM

## 2024-03-06 DIAGNOSIS — I10 ESSENTIAL HYPERTENSION: ICD-10-CM

## 2024-03-06 DIAGNOSIS — I25.10 CORONARY ARTERY DISEASE INVOLVING NATIVE CORONARY ARTERY OF NATIVE HEART WITHOUT ANGINA PECTORIS: ICD-10-CM

## 2024-03-06 PROBLEM — Q23.81 BICUSPID AORTIC VALVE: Status: ACTIVE | Noted: 2024-03-06

## 2024-03-06 PROBLEM — I21.4 NSTEMI (NON-ST ELEVATED MYOCARDIAL INFARCTION) (HCC): Status: RESOLVED | Noted: 2023-07-04 | Resolved: 2024-03-06

## 2024-03-06 PROCEDURE — 99214 OFFICE O/P EST MOD 30 MIN: CPT | Performed by: INTERNAL MEDICINE

## 2024-03-06 RX ORDER — CLOPIDOGREL BISULFATE 75 MG/1
75 TABLET ORAL DAILY
Qty: 90 TABLET | Refills: 1 | Status: SHIPPED | OUTPATIENT
Start: 2024-03-06

## 2024-03-06 NOTE — PROGRESS NOTES
Cardiology Outpatient Follow-Up Note - Naina Jamison 48 y.o. female MRN: [id number]      Assessment/Plan:    1. Bicuspid aortic valve  With moderate stenosis. We will repeat an echocardiogram in 6 months for monitoring (12 months from prior study).   Discussed screening of 1st degree family members.     2. Type 1 diabetes mellitus with other circulatory complication (HCC)  Continue statin therapy, currently on atorvastatin 40 mg daily  Following with endocrinology. I had previously rx'd Jardiance for her ASCVD risk however endocrinology recommended against due to increased risk of DKA in the T1DM population. As such, will d/c rx.     3. Mixed hyperlipidemia  Continue atorvastatin 40 mg daily    4. Essential hypertension  Controlled on current therapy, continue prinzide 10-12.5 mg daily.     5. Coronary artery disease involving native coronary artery of native heart without angina pectoris  She has been having fatigue, only on metoprolol 12.5 mg BID -- we will try stopping  She has been having episodic dyspnea, possibly HEMANTH to Brilinta. Switch P2Y12i to clopidogrel 75 mg daily. Continue DAPT with aspirin 81 mg daily and clopidogrel 75 mg daily until July 2024 then reduce to aspirin 81 mg daily mono-antiplatelet therapy.   Continue atorvastatin as above.           We will see Naina Jamison back in 3 months for routine follow-up.    Subjective:     HPI: Naina Jamison is a 48 y.o. year old female with morbid obesity, T1DM, HLD, and premature CAD with NSTEMI and PCI to LAD in July 2023 presenting for follow-up.     She presented with atypical symptoms SLB and found to have elevated troponin > 4000. She underwent cardiac cath which showed a mid-LAD 95% culprit lesion which was stented (Xience Skypoint 3 x 33 mm KSENIA). Echo showed normal LV function and mild-moderate AS. I personally reviewed and interpreted the echo images from 7/5/23 and agree as reported that her AV is possibly bicuspid.     We started  Tanikaance last visit however patient is no longer taking.     She has been having random episodes of dyspnea lately. She feels like she can't get a breath. Sometimes it feels like her throat is closing. It happens at night in bed and sitting upright driving she notices. It has been occurring since her NSTEMI.         Cardiac Testing:    DARRICK 3D 9/7/23    Interpretation Summary         Left Ventricle: Left ventricular cavity size is normal. Wall thickness is normal. The left ventricular ejection fraction is 60%. Systolic function is normal. Wall motion is normal.    Left Atrium: The atrium is dilated. There is no thrombus.    Atrial Septum: There is no atrial septal defect. No patent foramen ovale detected, confirmed at rest using color doppler.    Aortic Valve: The aortic valve is congenitally bicuspid. The leaflets are moderately calcified. There is mildly reduced mobility. There is systolic doming. There is moderate stenosis. The aortic valve peak velocity is 2.5 m/s. The aortic valve peak gradient is 24.0 mmHg. The aortic valve mean gradient is 15.0 mmHg. The dimensionless velocity index is 0.32. The aortic valve area is 1.08 cm2 by continuity VTI, and approximately 1.1 to 1.3 cm2 by planimetry. The stroke volume index is 30.40 ml/m2, suggestive of mildly decreased flow.    Aorta: The aortic root is normal in size. The ascending aorta is normal in size. The ascending aorta is 2.8 cm.     Bicuspid aortic valve anatomy demonstrated with leaflet calcification, a moderate degree of valvular stenosis, and minimal regurgitation. Aortic root and ascending aorta are normal in size.             Cardiac Cath 7/5/23   Impression         Mid LAD lesion is 95% stenosed. Culprit of NSTEMI. S/P successful IVUS-guided PCI to the mid LAD with KSENIA x 1. Mild reisdual CAD amenable to GDMT.    LVEDP normal without gradient on LV-AO pullback     Recommendation    Cont GDMT optimization for CAD  DAPT for at least 12 months  Aggressive  "risk factor reduction   on diet/lifestyle modification  Cardiac rehab upon discharge       Echo 7/5/23 Interpretation Summary         Left Ventricle: Left ventricular cavity size is normal. Wall thickness is normal. The left ventricular ejection fraction is 65%. Systolic function is normal. Diastolic function is normal.    The following segments are hypokinetic: apical septal, apical inferior and apex.    All other segments are normal.    Aortic Valve: The aortic valve is possibly bicuspid. The leaflets are not thickened. The leaflets are not calcified. The leaflets exhibit normal mobility. There is mild to moderate stenosis. The aortic valve mean gradient is 17 mmHg. The dimensionless velocity index is 0.41. The aortic valve area is 1.60 cm2.          EKGs, personally reviewed:  N/a    Relevant Labs & Results:  CMP 7/26/23 -- reviewed, K 4.2, Cr 1.02  CBC 7/26/23 -- WNL  Lipid profile 7/05/23 -- LDL 33 mg/dL  HbA1c 7/5/23 -- 9.5%        ROS:  Review of Systems:  Review of Systems    Objective:     Vitals:   Vitals:    03/06/24 1041   BP: 122/72   BP Location: Right arm   Patient Position: Sitting   Cuff Size: Large   Pulse: 70   SpO2: 97%   Weight: 114 kg (251 lb)   Height: 5' 4\" (1.626 m)    Body surface area is 2.16 meters squared.  Wt Readings from Last 3 Encounters:   03/06/24 114 kg (251 lb)   11/10/23 115 kg (254 lb)   10/31/23 115 kg (254 lb 4 oz)       Physical Exam:  General: Naina Jamison is a well appearing female, in no acute distress, sitting comfortably  HEENT: moist mucous membranes, EOMI  Neck:  No JVD, supple, trachea midline  Cardiovascular: soft S2, RRR, 3/6 SM  Pulmonary: normal respiratory effort, CTAB  Abdomen: soft and nondistended  Extremities: No lower extremity edema. Warm and well perfused extremities.  Neuro: no focal motor deficits, AAOx3 (person, place, time)  Psych: Normal mood and affect, cooperative        Medications (at the START of this encounter):  Outpatient " Medications Prior to Visit   Medication Sig Dispense Refill    ALPRAZolam (XANAX) 1 mg tablet Take 1 mg by mouth daily at bedtime as needed for anxiety Pt unable to verify dose for RN      aspirin (ECOTRIN LOW STRENGTH) 81 mg EC tablet Take 1 tablet (81 mg total) by mouth daily Do not start before July 7, 2023.  0    atorvastatin (LIPITOR) 40 mg tablet       cholecalciferol (VITAMIN D3) 1,000 units tablet Take 2,000 Units by mouth daily Pt unable to verify dose for RN      DULoxetine (CYMBALTA) 30 mg delayed release capsule Take 30 mg by mouth daily Pt unable to verify dose for RN      HYDROcodone-acetaminophen (NORCO) 5-325 mg per tablet Take 1 tablet by mouth every 6 (six) hours as needed for pain Pt unable to verify dose for RN      hydrOXYzine HCL (ATARAX) 25 mg tablet Take 25 mg by mouth every 6 (six) hours as needed for itching Pt unable to verify dose for RN      lisinopril-hydrochlorothiazide (PRINZIDE,ZESTORETIC) 10-12.5 MG per tablet Take 1 tablet by mouth daily 90 tablet 3    metoprolol tartrate (LOPRESSOR) 25 mg tablet Take 0.5 tablets (12.5 mg total) by mouth every 12 (twelve) hours 90 tablet 3    nitroglycerin (NITROSTAT) 0.4 mg SL tablet Place 1 tablet (0.4 mg total) under the tongue every 5 (five) minutes as needed for chest pain 30 tablet 2    NovoLOG 100 UNIT/ML injection To be used in insulin pump approximately 120 units a day 110 mL 0    omeprazole (PriLOSEC) 20 mg delayed release capsule Take 20 mg by mouth daily      PATIENT MAINTAINED INSULIN PUMP Inject 1 each under the skin every 8 (eight) hours      rosuvastatin (CRESTOR) 40 MG tablet Take 1 tablet (40 mg total) by mouth daily 90 tablet 3    ticagrelor (BRILINTA) 90 MG Take 1 tablet (90 mg total) by mouth every 12 (twelve) hours 180 tablet 11    capsaicin (ZOSTRIX) 0.025 % cream Apply 1 Application topically 1 (one) time for 1 dose 120 g 2    Empagliflozin (Jardiance) 25 MG TABS Take 1 tablet (25 mg total) by mouth every morning (Patient  "not taking: Reported on 10/31/2023) 90 tablet 3    omeprazole (PriLOSEC) 20 mg delayed release capsule Take 20 mg by mouth daily Pt unable to verify dose for RN (Patient not taking: Reported on 3/6/2024)      tirzepatide (Zepbound) 2.5 mg/0.5 mL auto-injector Inject 0.5 mL (2.5 mg total) under the skin once a week (Patient not taking: Reported on 3/6/2024) 2 mL 1     No facility-administered medications prior to visit.                 Time Spent:  Total time (face-to-face and non-face-to-face) spent on today's visit was 23 minutes. This includes preparation for the visits (i.e. reviewing test results), performance of a medically appropriate history and examination, and orders for medications, tests or other procedures. This time is exclusive of procedures performed and time spent teaching.      This note was completed in part utilizing Dragon [employer] voice recognition software. Grammatical errors, random word insertion, spelling mistakes, occasional wrong word or \"sound-alike\" substitutions and incomplete sentences may be an occasional consequence of the system secondary to software limitations, ambient noise and hardware issues. At the time of dictation, efforts were made to edit, clarify and /or correct errors.  Please read the chart carefully and recognize, using context, where substitutions have occurred.  If you have any questions or concerns about the context, text or information contained within the body of this dictation, please contact myself, the provider, for further clarification.    "

## 2024-03-11 ENCOUNTER — TELEPHONE (OUTPATIENT)
Dept: ENDOCRINOLOGY | Facility: CLINIC | Age: 49
End: 2024-03-11

## 2024-03-11 ENCOUNTER — DOCUMENTATION (OUTPATIENT)
Dept: ENDOCRINOLOGY | Facility: CLINIC | Age: 49
End: 2024-03-11

## 2024-04-04 ENCOUNTER — PATIENT MESSAGE (OUTPATIENT)
Dept: CARDIOLOGY CLINIC | Facility: CLINIC | Age: 49
End: 2024-04-04

## 2024-04-04 DIAGNOSIS — R00.2 PALPITATIONS: ICD-10-CM

## 2024-04-04 DIAGNOSIS — Q23.1 BICUSPID AORTIC VALVE: Primary | ICD-10-CM

## 2024-04-07 DIAGNOSIS — E10.59 TYPE 1 DIABETES MELLITUS WITH OTHER CIRCULATORY COMPLICATION (HCC): Primary | ICD-10-CM

## 2024-04-07 RX ORDER — BLOOD-GLUCOSE TRANSMITTER
EACH MISCELLANEOUS
Qty: 1 EACH | Refills: 0 | Status: SHIPPED | OUTPATIENT
Start: 2024-04-07

## 2024-04-07 RX ORDER — BLOOD-GLUCOSE SENSOR
1 EACH MISCELLANEOUS
Qty: 15 EACH | Refills: 0 | Status: SHIPPED | OUTPATIENT
Start: 2024-04-07 | End: 2024-07-06

## 2024-04-08 ENCOUNTER — TELEPHONE (OUTPATIENT)
Age: 49
End: 2024-04-08

## 2024-04-08 NOTE — TELEPHONE ENCOUNTER
PA for [employer] and transmitter Approved   Date(s) approved  April 6, 2024 to April 6, 2025       Patient advised by [x] MyChart Message                      [x] Phone call LMOM      Pharmacy advised by []Fax                                     [x]Phone call    Approval letter scanned into [employer]

## 2024-04-09 ENCOUNTER — PATIENT MESSAGE (OUTPATIENT)
Dept: CARDIOLOGY CLINIC | Facility: CLINIC | Age: 49
End: 2024-04-09

## 2024-04-09 ENCOUNTER — TELEPHONE (OUTPATIENT)
Dept: CARDIOLOGY CLINIC | Facility: CLINIC | Age: 49
End: 2024-04-09

## 2024-04-09 NOTE — PATIENT COMMUNICATION
Limited echo to reassess BAV early  Holter and EKG to assess fluttering and worsened dyspnea -- possibly new arrhythmia?  Needs follow-up with me after these studies

## 2024-04-09 NOTE — TELEPHONE ENCOUNTER
Per Dr. Newell- Limited echo to reassess BAV early  Holter and EKG to assess fluttering and worsened dyspnea -- possibly new arrhythmia?  Needs follow-up with me after these studies    --------    Called pt to advise of Dr. Ocampo message. She will call to get these tests scheduld. Will get her in for nurse visit- EKG. She will call central scheduling for other tests.

## 2024-04-09 NOTE — TELEPHONE ENCOUNTER
Regarding: Update  Contact: [phone]  ----- Message from Alonso Newell MD sent at 4/9/2024  9:28 AM EDT -----       ----- Message from Naina Jamison to Alonso Newell MD sent at 4/4/2024 12:54 PM -----   Good afternoon,     I wanted  to let you know that the symptom of my throat closing has gone away with the stop of brilinta.  However I still have symptoms that are concerning to  me and concerns that my heart is working way too hard and limiting my quality of life.   I am still extremely tired and tired very easily, i get out of breath very quickly which makes it difficult to exercise or even take walks, I am swollen, get nauseous and have even thrown up. Getting headaches.  It feels like my heart is fluttering and struggles to get blood flow.     I know you stated that the the valve will be fixed when it gets worse but if it is causing problems would be be better to do it sooner than later so I can get a better quality of life back??? Just something I wanted to ask.   Please let me know how you would like to move forward and if you want to see me in May as previously discussed at my last appointment.    Thank you for your time,   Naina

## 2024-04-11 ENCOUNTER — CLINICAL SUPPORT (OUTPATIENT)
Dept: CARDIOLOGY CLINIC | Facility: CLINIC | Age: 49
End: 2024-04-11
Payer: COMMERCIAL

## 2024-04-11 VITALS
BODY MASS INDEX: 42.85 KG/M2 | WEIGHT: 251 LBS | HEART RATE: 83 BPM | HEIGHT: 64 IN | DIASTOLIC BLOOD PRESSURE: 60 MMHG | SYSTOLIC BLOOD PRESSURE: 115 MMHG

## 2024-04-11 DIAGNOSIS — E10.59 TYPE 1 DIABETES MELLITUS WITH OTHER CIRCULATORY COMPLICATION (HCC): ICD-10-CM

## 2024-04-11 DIAGNOSIS — I25.10 CORONARY ARTERY DISEASE INVOLVING NATIVE CORONARY ARTERY OF NATIVE HEART WITHOUT ANGINA PECTORIS: Primary | ICD-10-CM

## 2024-04-11 DIAGNOSIS — I10 ESSENTIAL HYPERTENSION: ICD-10-CM

## 2024-04-11 DIAGNOSIS — R00.2 FLUTTERING SENSATION OF HEART: ICD-10-CM

## 2024-04-11 DIAGNOSIS — R06.02 SOB (SHORTNESS OF BREATH): Primary | ICD-10-CM

## 2024-04-11 PROCEDURE — 99211 OFF/OP EST MAY X REQ PHY/QHP: CPT

## 2024-04-11 PROCEDURE — 93000 ELECTROCARDIOGRAM COMPLETE: CPT

## 2024-04-11 NOTE — PROGRESS NOTES
Patient came in for nurse visit/EKG for continued SOB, left sided chest pain and fatigue. Medications reviewed and BP is 115/60 and HR is 83, today.  EKG was ordered by Dr Alonso Newell.  EKG is signed by DR Barrera and will be sent to Dr Newell in [facility] office.  Please call patient with interpretation and any new directions or advisement.

## 2024-04-11 NOTE — PATIENT COMMUNICATION
Let's please get Naina in for an NAP[facility]LEON visit (CHF slot OK) asap.   Start lasix 20 mg BID  BMP in 1 week    Please call patient with above  Let her know I reviewed EKG from today and it is normal  I also notice her echo is pending 4/25 and we will await results of that.

## 2024-04-11 NOTE — TELEPHONE ENCOUNTER
Regarding: Testing  Contact: [phone]  ----- Message from Alonso Newell MD sent at 4/11/2024  3:57 PM EDT -----       ----- Message from Naina Jamison to Jeni Zarco sent at 4/10/2024  9:56 PM -----   Hi    I wanted to let the doctor know that I just weighed myself & I have gained 12 pounds in the last 2 weeks. I am really swollen. I do have all the testing scheduled but felt like I should tell him this information.      Thank you,   Naina       ----- Message -----       From:Jeni LPOEZ       Sent:4/9/2024 10:47 AM EDT         To:Naina Jamison    Subject:Testing    Actually disregard my previous message. I saw you were able to get them all scheduled!      Take care!    CARMEN Ngo

## 2024-04-12 ENCOUNTER — TELEPHONE (OUTPATIENT)
Dept: CARDIOLOGY CLINIC | Facility: CLINIC | Age: 49
End: 2024-04-12

## 2024-04-12 RX ORDER — FUROSEMIDE 20 MG/1
20 TABLET ORAL 2 TIMES DAILY
COMMUNITY

## 2024-04-12 RX ORDER — FUROSEMIDE 20 MG/1
20 TABLET ORAL 2 TIMES DAILY
Qty: 60 TABLET | Refills: 5 | Status: SHIPPED | OUTPATIENT
Start: 2024-04-12

## 2024-04-12 NOTE — TELEPHONE ENCOUNTER
Regarding: Testing  Contact: [phone]  ----- Message from Alonso Newell MD sent at 4/11/2024  3:57 PM EDT -----       ----- Message from Naina Jamison to Jeni Zarco sent at 4/10/2024  9:56 PM -----   Hi    I wanted to let the doctor know that I just weighed myself & I have gained 12 pounds in the last 2 weeks. I am really swollen. I do have all the testing scheduled but felt like I should tell him this information.      Thank you,   Naina       ----- Message -----       From:Jeni LOPEZ       Sent:4/9/2024 10:47 AM EDT         To:Naina Jamison    Subject:Testing    Actually disregard my previous message. I saw you were able to get them all scheduled!      Take care!    CARMEN Ngo

## 2024-04-12 NOTE — TELEPHONE ENCOUNTER
Per Dr. Newell- Let's please get Naina in for an N[facility] visit (CHF slot OK) asap.   Start lasix 20 mg BID  BMP in 1 week     Please call patient with above  Let her know I reviewed EKG from today and it is normal  I also notice her echo is pending 4/25 and we will await results of that.     -------------------  Called pt, advised of message above per Dr. Newell. Pt verbally understood.

## 2024-04-18 ENCOUNTER — OFFICE VISIT (OUTPATIENT)
Dept: CARDIOLOGY CLINIC | Facility: CLINIC | Age: 49
End: 2024-04-18
Payer: COMMERCIAL

## 2024-04-18 VITALS
WEIGHT: 256.7 LBS | OXYGEN SATURATION: 97 % | HEIGHT: 64 IN | DIASTOLIC BLOOD PRESSURE: 72 MMHG | HEART RATE: 80 BPM | SYSTOLIC BLOOD PRESSURE: 138 MMHG | BODY MASS INDEX: 43.83 KG/M2

## 2024-04-18 DIAGNOSIS — I10 ESSENTIAL HYPERTENSION: ICD-10-CM

## 2024-04-18 DIAGNOSIS — E87.70 HYPERVOLEMIA, UNSPECIFIED HYPERVOLEMIA TYPE: ICD-10-CM

## 2024-04-18 DIAGNOSIS — R06.09 DYSPNEA ON MINIMAL EXERTION: Primary | ICD-10-CM

## 2024-04-18 DIAGNOSIS — I25.10 CORONARY ARTERY DISEASE INVOLVING NATIVE CORONARY ARTERY OF NATIVE HEART WITHOUT ANGINA PECTORIS: ICD-10-CM

## 2024-04-18 PROCEDURE — 99215 OFFICE O/P EST HI 40 MIN: CPT | Performed by: PHYSICIAN ASSISTANT

## 2024-04-18 NOTE — PROGRESS NOTES
General Cardiology Outpatient Progress Note    Naina Jamison 48 y.o. 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 Advised to begin Lasix 20 mg BID.   Advised to being daily weights.   Advised to cut back fluid intake to ~65-70 oz daily.   Will ask office RN to contact patient early next week for clinical update.   If later determined that she will require Lasix to maintain euvolemia, would consider discontinuing HCTZ at that time.   TTE and Holter monitor scheduled for next week.   Will check BMP and BNP in ~10-14 days.     Plan:  Acute volume overload / ?acute HFpEF   TTE 07/05/2023: LVEF 65%. LVIDd 4.1 cm. Normal RV. Possible bicuspid AV with mild to moderate AS.     Weight of 251 lbs on 03/06. Today, weighs 256 lbs.    Most recent BMP from 07/26/2023: sodium 138; potassium 4.2; BUN 11; creatinine 1.02; eGFR 66.     Pharmacotherapies:  --Aldosterone Antagonist: No.   --SGLT2 Inhibitor: No, type 1 DM.   --Diuretic: Lasix 20 mg BID.     Coronary artery disease   Without active chest pain.   [facility] 07/05/2023: 95% stenosis mid LAD (received PCI with KSENIA x1).    Continue on aspirin, Plavix, and statin. PRN SL nitro prescribed.   BB stopped in March 2024 due to fatigue per prior notes.     Hypertension   BP of 138/72 mmHg in office today.   Continues on lisinopril 10 mg  "daily, HCTZ 12.5 mg daily, and medications as above.    Bicuspid aortic valve with moderate stenosis  Hyperlipidemia   Diabetes mellitus, type I  Sleep apnea  Grave's disease    HPI:   Naina Jamison is a 48-year-old woman with a PMH as above who presents to the office for an acute visit. Follows with Dr. Newell.     03/06/2024 with Dr. Newell: \"She presented with atypical symptoms SLB and found to have elevated troponin > 4000. She underwent cardiac cath which showed a mid-LAD 95% culprit lesion which was stented (Xience Skypoint 3 x 33 mm KSENIA). Echo showed normal LV function and mild-moderate AS. I personally reviewed and interpreted the echo images from 7/5/23 and agree as reported that her AV is possibly bicuspid. We started Jardiance last visit however patient is no longer taking. She has been having random episodes of dyspnea lately. She feels like she can't get a breath. Sometimes it feels like her throat is closing. It happens at night in bed and sitting upright driving she notices. It has been occurring since her NSTEMI.      Cont GDMT optimization for CAD  DAPT for at least 12 months  Aggressive risk factor reduction   on diet/lifestyle modification  Cardiac rehab upon discharge.\"    Patient contacted office on 04/10 with report of 12 lbs weight gain in 2 weeks. Was started on Lasix 20 mg BID.     04/18/2024: Patient presents for an acute visit. Primary complaint today of diffuse body swelling and SCHAFFER. Reports that after her NSTEMI last summer that she has been a on a slow functional decline. Within the last 2 months, her SCHAFFER continued to worsen to the point where most ADLs became more and more difficult to complete. Feels \"weighed down\" most days and has been having occasional headaches (milder in severity when compared to headaches preceding her MI). First noted swelling in her ear lobes about 2 months ago. Now experiencing swelling of the neck, forearms, hands, abdomen, and legs. Denies " recent viral illnesses, changes to medications (other than stopping Brillinta last month), or changes to her diet. Sensation of throat closing now resolved with Brillinta stop. Eating and sleeping okay. 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 Does not cook with or add salt to foods. Most days she will drink  oz water daily. She estimates a good or dry weight for her would be in low 240s (on home scale recently peaked at 255 lbs at home). Since being prescribed Lasix last week has only taken one dose. Did note increased urination with this but no significant weight loss s/p one dose.     Past Medical History:   Diagnosis Date    Diabetes mellitus (HCC)     Graves disease     High cholesterol     Hypertension        Review of Systems   Constitutional:  Negative for activity change, appetite change and fatigue.   HENT:  Positive for facial swelling. Negative for congestion, nosebleeds, postnasal drip, sneezing, sore throat, trouble swallowing and voice change.    Respiratory:  Positive for shortness of breath. Negative for cough and chest tightness.    Cardiovascular:  Positive for leg swelling. Negative for chest pain and palpitations.   Gastrointestinal:  Positive for abdominal distention. Negative for abdominal pain, diarrhea and nausea.   Genitourinary:  Negative for decreased urine volume, dysuria and urgency.   Musculoskeletal: Negative.    Skin: Negative.    Neurological:  Negative for dizziness, syncope, weakness and light-headedness.   Psychiatric/Behavioral:  Negative for confusion and sleep disturbance. The patient is not nervous/anxious.        Allergies   Allergen Reactions    Milnacipran GI Intolerance    Percocet [Oxycodone-Acetaminophen] Vomiting    Pregabalin Other (See Comments)     Didn't help    Victoza [Liraglutide] GI Intolerance         Current Outpatient Medications:     ALPRAZolam (XANAX) 1 mg tablet, Take 1 mg by mouth daily at bedtime as needed for anxiety Pt unable to  verify dose for RN, Disp: , Rfl:     aspirin (ECOTRIN LOW STRENGTH) 81 mg EC tablet, Take 1 tablet (81 mg total) by mouth daily Do not start before July 7, 2023., Disp: , Rfl: 0    atorvastatin (LIPITOR) 40 mg tablet, , Disp: , Rfl:     cholecalciferol (VITAMIN D3) 1,000 units tablet, Take 2,000 Units by mouth daily Pt unable to verify dose for RN, Disp: , Rfl:     clopidogrel (Plavix) 75 mg tablet, Take 1 tablet (75 mg total) by mouth daily, Disp: 90 tablet, Rfl: 1    DULoxetine (CYMBALTA) 30 mg delayed release capsule, Take 30 mg by mouth daily Pt unable to verify dose for RN, Disp: , Rfl:     furosemide (LASIX) 20 mg tablet, Take 1 tablet (20 mg total) by mouth 2 (two) times a day, Disp: 60 tablet, Rfl: 5    HYDROcodone-acetaminophen (NORCO) 5-325 mg per tablet, Take 1 tablet by mouth every 6 (six) hours as needed for pain Pt unable to verify dose for RN, Disp: , Rfl:     lisinopril-hydrochlorothiazide (PRINZIDE,ZESTORETIC) 10-12.5 MG per tablet, Take 1 tablet by mouth daily, Disp: 90 tablet, Rfl: 3    nitroglycerin (NITROSTAT) 0.4 mg SL tablet, Place 1 tablet (0.4 mg total) under the tongue every 5 (five) minutes as needed for chest pain, Disp: 30 tablet, Rfl: 2    NovoLOG 100 UNIT/ML injection, To be used in insulin pump approximately 120 units a day, Disp: 110 mL, Rfl: 0    capsaicin (ZOSTRIX) 0.025 % cream, Apply 1 Application topically 1 (one) time for 1 dose, Disp: 120 g, Rfl: 2    Continuous Blood Gluc Sensor (Guardian 4 Glucose Sensor) MISC, Use 1 each every 7 days, Disp: 15 each, Rfl: 0    Continuous Blood Gluc Transmit (Guardian 4 Transmitter) MISC, To check blood sugars continuously, Disp: 1 each, Rfl: 0    hydrOXYzine HCL (ATARAX) 25 mg tablet, Take 25 mg by mouth every 6 (six) hours as needed for itching Pt unable to verify dose for RN (Patient not taking: Reported on 4/11/2024), Disp: , Rfl:     PATIENT MAINTAINED INSULIN PUMP, Inject 1 each under the skin every 8 (eight) hours, Disp: , Rfl:  "    Social History     Socioeconomic History    Marital status: /Civil Union     Spouse name: Not on file    Number of children: Not on file    Years of education: Not on file    Highest education level: Not on file   Occupational History    Not on file   Tobacco Use    Smoking status: Never    Smokeless tobacco: Never   Vaping Use    Vaping status: Never Used   Substance and Sexual Activity    Alcohol use: Never    Drug use: Never    Sexual activity: Not Currently   Other Topics Concern    Not on file   Social History Narrative    Not on file     Social Determinants of Health     Financial Resource Strain: Not on file   Food Insecurity: No Food Insecurity (7/5/2023)    Hunger Vital Sign     Worried About Running Out of Food in the Last Year: Never true     Ran Out of Food in the Last Year: Never true   Transportation Needs: No Transportation Needs (7/5/2023)    PRAPARE - Transportation     Lack of Transportation (Medical): No     Lack of Transportation (Non-Medical): No   Physical Activity: Not on file   Stress: Not on file   Social Connections: Not on file   Intimate Partner Violence: Not on file   Housing Stability: Low Risk  (7/5/2023)    Housing Stability Vital Sign     Unable to Pay for Housing in the Last Year: No     Number of Places Lived in the Last Year: 1     Unstable Housing in the Last Year: No     History reviewed. No pertinent family history.    Vitals:   Blood pressure 138/72, pulse 80, height 5' 4\" (1.626 m), weight 116 kg (256 lb 11.2 oz), SpO2 97%.    Wt Readings from Last 10 Encounters:   04/18/24 116 kg (256 lb 11.2 oz)   04/11/24 114 kg (251 lb)   03/06/24 114 kg (251 lb)   11/10/23 115 kg (254 lb)   10/31/23 115 kg (254 lb 4 oz)   09/07/23 110 kg (243 lb)   08/25/23 110 kg (243 lb)   07/12/23 109 kg (240 lb 14.4 oz)   07/05/23 110 kg (242 lb)     Vitals:    04/18/24 1351   BP: 138/72   BP Location: Left arm   Patient Position: Sitting   Cuff Size: Large   Pulse: 80   SpO2: 97% " "  Weight: 116 kg (256 lb 11.2 oz)   Height: 5' 4\" (1.626 m)       Physical Exam  Vitals reviewed.   Constitutional:       General: She is awake. She is not in acute distress.     Appearance: Normal appearance. She is well-developed and overweight. She is not toxic-appearing or diaphoretic.   HENT:      Head: Normocephalic.      Nose: Nose normal.      Mouth/Throat:      Mouth: Mucous membranes are moist.   Eyes:      General: No scleral icterus.     Conjunctiva/sclera: Conjunctivae normal.   Neck:      Vascular: JVD present.   Cardiovascular:      Rate and Rhythm: Normal rate and regular rhythm.      Heart sounds: Murmur heard.   Pulmonary:      Effort: Pulmonary effort is normal. No tachypnea, bradypnea or respiratory distress.      Breath sounds: Normal air entry. No decreased air movement. Decreased breath sounds present. No wheezing or rhonchi.   Abdominal:      General: There is distension.      Palpations: Abdomen is soft.      Tenderness: There is no abdominal tenderness.   Musculoskeletal:      Cervical back: Neck supple.      Right lower leg: Edema present.      Left lower leg: Edema present.   Skin:     General: Skin is warm and dry.      Coloration: Skin is not jaundiced or pale.   Neurological:      General: No focal deficit present.      Mental Status: She is alert and oriented to person, place, and time.   Psychiatric:         Attention and Perception: Attention normal.         Mood and Affect: Affect normal. Mood is anxious.         Speech: Speech normal.         Behavior: Behavior normal. Behavior is cooperative.         Thought Content: Thought content normal.       Labs & Results:  Lab Results   Component Value Date    WBC 10.73 (H) 07/26/2023    HGB 13.0 07/26/2023    HCT 39.2 07/26/2023    MCV 88 07/26/2023     07/26/2023     Lab Results   Component Value Date    SODIUM 138 07/26/2023    K 4.2 07/26/2023     07/26/2023    CO2 27 07/26/2023    BUN 11 07/26/2023    CREATININE 1.02 " "07/26/2023    GLUC 146 (H) 07/26/2023    CALCIUM 8.7 07/26/2023     Lab Results   Component Value Date    INR 1.11 07/04/2023    PROTIME 14.5 07/04/2023     No results found for: \"NTBNP\"   No results found for: \"BNP\"     Kriss Medrano PA-C  "

## 2024-04-18 NOTE — PATIENT INSTRUCTIONS
Continue Lasix at 20 mg twice a day.   Complete blood work in 10-14 days.     Please weigh yourself every day (after emptying your bladder) and keep a detailed log of weights.   Contact the Heart Failure program at [phone] if you gain 3+ lbs overnight or 5+ lbs in 5-7 days.  Limit daily sodium/salt intake to 2000 mg daily to prevent fluid retention.  Avoid canned foods, fast food/Chinese food, and processed meats (hot dogs, lunch meat, and sausage etc.). Caution with condiments.  Limit fluid intake to 2000 mL or 2 liters (about 60-65 ounces) daily.  Avoid electrolyte replacement drinks (such as Gatorade, Pedialyte, Propel, Liquid IV, etc.).  Bring complete list of medications and log of daily weights to your follow-up appointment.

## 2024-04-22 ENCOUNTER — TELEPHONE (OUTPATIENT)
Dept: CARDIOLOGY CLINIC | Facility: CLINIC | Age: 49
End: 2024-04-22

## 2024-04-22 NOTE — TELEPHONE ENCOUNTER
F/U call to patient who stated she started taking the Lasix 20 mg BID on Thursday, 4/18 after OV with Kriss Medrano PA-C, & has been taking since.    Weighed herself on Saturday,   4/20 Wt -  255 lb, stating she lost 2 lbs.    Has not been weighing herself daily but stated she will start tomorrow.    Encouraged patient to keep a daily log of her weight.    Stated she is a little less swollen but still experiencing swelling of her face, neck, forearms , hands, abdomen, & legs.    Continues with SOB. Denies chest pain or discomfort.    Stated trying to follow fluid intake of 65-70 oz daily & trying to follow 2 g sodium daily diet, but is difficult.    Discussed scheduled 4/25 Echo, followed by Holter monitor placement.  Also discussed lab work to be drawn next Monday, 4/20-Thursday 5/2 as ordered.     Patient verbalized understanding.

## 2024-04-22 NOTE — TELEPHONE ENCOUNTER
----- Message from Kriss Medrano PA-C sent at 4/18/2024  3:19 PM EDT -----  Please contact patient for update on symptoms and weights. See my note for details.     Thanks!  Kriss

## 2024-04-25 ENCOUNTER — HOSPITAL ENCOUNTER (OUTPATIENT)
Dept: NON INVASIVE DIAGNOSTICS | Facility: CLINIC | Age: 49
Discharge: HOME | End: 2024-04-25
Payer: COMMERCIAL

## 2024-04-25 VITALS
SYSTOLIC BLOOD PRESSURE: 138 MMHG | DIASTOLIC BLOOD PRESSURE: 72 MMHG | HEIGHT: 64 IN | BODY MASS INDEX: 43.71 KG/M2 | HEART RATE: 80 BPM | WEIGHT: 256 LBS

## 2024-04-25 DIAGNOSIS — R00.2 PALPITATIONS: ICD-10-CM

## 2024-04-25 DIAGNOSIS — Q23.1 BICUSPID AORTIC VALVE: ICD-10-CM

## 2024-04-25 LAB
AORTIC ROOT: 2.9 CM
AORTIC VALVE MEAN VELOCITY: 18.5 M/S
APICAL FOUR CHAMBER EJECTION FRACTION: 66 %
ASCENDING AORTA: 2.6 CM
AV AREA BY CONTINUOUS VTI: 1.5 CM2
AV AREA PEAK VELOCITY: 1.2 CM2
AV LVOT MEAN GRADIENT: 2 MMHG
AV LVOT PEAK GRADIENT: 4 MMHG
AV MEAN GRADIENT: 16 MMHG
AV PEAK GRADIENT: 30 MMHG
AV VALVE AREA: 1.51 CM2
AV VELOCITY RATIO: 0.38
BSA FOR ECHO PROCEDURE: 2.17 M2
DOP CALC AO PEAK VEL: 2.72 M/S
DOP CALC AO VTI: 46.2 CM
DOP CALC LVOT AREA: 3.14 CM2
DOP CALC LVOT CARDIAC INDEX: 2.12 L/MIN/M2
DOP CALC LVOT CARDIAC OUTPUT: 4.63 L/MIN
DOP CALC LVOT DIAMETER: 2 CM
DOP CALC LVOT PEAK VEL VTI: 22.16 CM
DOP CALC LVOT PEAK VEL: 1.02 M/S
DOP CALC LVOT STROKE INDEX: 33 ML/M2
DOP CALC LVOT STROKE VOLUME: 69.58
E WAVE DECELERATION TIME: 218 MS
E/A RATIO: 1.02
FRACTIONAL SHORTENING: 33 (ref 28–44)
INTERVENTRICULAR SEPTUM IN DIASTOLE (PARASTERNAL SHORT AXIS VIEW): 1.1 CM
INTERVENTRICULAR SEPTUM: 1.1 CM (ref 0.6–1.1)
LAAS-AP4: 13.8 CM2
LEFT ATRIUM SIZE: 3.3 CM
LEFT INTERNAL DIMENSION IN SYSTOLE: 3.1 CM (ref 2.1–4)
LEFT VENTRICULAR INTERNAL DIMENSION IN DIASTOLE: 4.6 CM (ref 3.5–6)
LEFT VENTRICULAR POSTERIOR WALL IN END DIASTOLE: 1.2 CM
LEFT VENTRICULAR STROKE VOLUME: 59 ML
LVSV (TEICH): 59 ML
MV E'TISSUE VEL-LAT: 11 CM/S
MV E'TISSUE VEL-SEP: 13 CM/S
MV PEAK A VEL: 0.83 M/S
MV PEAK E VEL: 85 CM/S
MV STENOSIS PRESSURE HALF TIME: 63 MS
MV VALVE AREA P 1/2 METHOD: 3.49
RIGHT ATRIUM AREA SYSTOLE A4C: 15.1 CM2
RIGHT VENTRICLE ID DIMENSION: 3.5 CM
SL CV LV EF: 60
SL CV PED ECHO LEFT VENTRICLE DIASTOLIC VOLUME (MOD BIPLANE) 2D: 96 ML
SL CV PED ECHO LEFT VENTRICLE SYSTOLIC VOLUME (MOD BIPLANE) 2D: 37 ML
TRICUSPID ANNULAR PLANE SYSTOLIC EXCURSION: 2.3 CM

## 2024-04-25 PROCEDURE — 93306 TTE W/DOPPLER COMPLETE: CPT

## 2024-04-25 PROCEDURE — 93306 TTE W/DOPPLER COMPLETE: CPT | Performed by: INTERNAL MEDICINE

## 2024-04-25 PROCEDURE — 93225 XTRNL ECG REC<48 HRS REC: CPT

## 2024-04-25 PROCEDURE — 93226 XTRNL ECG REC<48 HR SCAN A/R: CPT

## 2024-05-01 ENCOUNTER — TELEPHONE (OUTPATIENT)
Dept: CARDIOLOGY CLINIC | Facility: CLINIC | Age: 49
End: 2024-05-01

## 2024-05-01 PROCEDURE — 93227 XTRNL ECG REC<48 HR R&I: CPT | Performed by: INTERNAL MEDICINE

## 2024-05-01 NOTE — TELEPHONE ENCOUNTER
----- Message from Mar Dickey sent at 5/1/2024  8:53 AM EDT -----  Regarding: FW: Update  Contact: [phone]    ----- Message -----  From: Alonso Newell MD  Sent: 5/1/2024   8:50 AM EDT  To: Jeni Zarco; [facility] [place] [facility]  Subject: FW: Update                                       Please let patient know there were no major changes on echo to explain her symptoms.   Her Holter is still being processed.    How is her weight trending?   ----- Message -----  From: Opal Murillo LPN  Sent: 4/30/2024   3:55 PM EDT  To: Alonso Newell MD  Subject: FW: Update                                         ----- Message -----  From: Naina Jamison  Sent: 4/30/2024   3:49 PM EDT  To: [facility]  Subject: Update                                           Mitesh Ngo,   I wanted to let you know I had the Echo gram completed last Thursday (April 25) and the 24 hours monitor on April 25 - 26.   Was just wondering of Dr. Newell has had a chance to look at them.     Thank you,   Naina

## 2024-05-01 NOTE — TELEPHONE ENCOUNTER
Spoke with Naina regarding weight. She says she has lost about 4lbs since taking lasix 20mg BID- still feels tired/sob. However she did mention she tends to forget afternoon dosage, so she has not been taking twice daily all the time. She is also concerned with her diabetes and kidney function and is wondering if the lasix can affect either one. States this is also why she has not been taking afternoon dosage at times.    Please advise.

## 2024-05-01 NOTE — TELEPHONE ENCOUNTER
Caller: Naina    Reason for call: Returning Jeni's call    Call back#: [phone]    Patient returned Jeni's call.  She will be available for a call back any time today and has phone with her.

## 2024-05-01 NOTE — TELEPHONE ENCOUNTER
----- Message from Mar Dickey sent at 5/1/2024  8:53 AM EDT -----  Regarding: FW: Update  Contact: [phone]    ----- Message -----  From: Alonso Newell MD  Sent: 5/1/2024   8:50 AM EDT  To: Jeni Zarco; [facility] [place] [facility]  Subject: FW: Update                                       Please let patient know there were no major changes on echo to explain her symptoms.   Her Holter is still being processed.    How is her weight trending?   ----- Message -----  From: Opal Murillo LPN  Sent: 4/30/2024   3:55 PM EDT  To: Alonso Newell MD  Subject: FW: Update                                         ----- Message -----  From: Naina Jamison  Sent: 4/30/2024   3:49 PM EDT  To: [facility]  Subject: Update                                           Mitesh Ngo,   I wanted to let you know I had the Echo gram completed last Thursday (April 25) and the 24 hours monitor on April 25 - 26.   Was just wondering of Dr. Newell has had a chance to look at them.     Thank you,   Naina

## 2024-05-02 ENCOUNTER — NURSE TRIAGE (OUTPATIENT)
Age: 49
End: 2024-05-02

## 2024-05-02 NOTE — TELEPHONE ENCOUNTER
"Reason for Disposition  • Taking a medicine that could cause dizziness (e.g., blood pressure medications, diuretics)    Answer Assessment - Initial Assessment Questions  1. DESCRIPTION: \"Describe your dizziness.\"      Dizzy spells increased the past 2 days, happening while laying down  2. LIGHTHEADED: \"Do you feel lightheaded?\" (e.g., somewhat faint, woozy, weak upon standing)      While changing body positions laying down feels dizzy   3. VERTIGO: \"Do you feel like either you or the room is spinning or tilting?\" (i.e. vertigo)      Spinning   4. SEVERITY: \"How bad is it?\"  \"Do you feel like you are going to faint?\" \"Can you stand and walk?\"    - MILD: Feels slightly dizzy, but walking normally.    - MODERATE: Feels very unsteady when walking, but not falling; interferes with normal activities (e.g., school, work) .    - SEVERE: Unable to walk without falling, or requires assistance to walk without falling; feels like passing out now.       Mild   5. ONSET:  \"When did the dizziness begin?\"      Ongoing randomly longer than 2 days. The past 2 nights happened increasingly   6. AGGRAVATING FACTORS: \"Does anything make it worse?\" (e.g., standing, change in head position)      Change in body/head position  7. HEART RATE: \"Can you tell me your heart rate?\" \"How many beats in 15 seconds?\"  (Note: not all patients can do this)        Denies   8. CAUSE: \"What do you think is causing the dizziness?\"      Unsure   9. RECURRENT SYMPTOM: \"Have you had dizziness before?\" If Yes, ask: \"When was the last time?\" \"What happened that time?\"      Yes, would happen occasionally   10. OTHER SYMPTOMS: \"Do you have any other symptoms?\" (e.g., fever, chest pain, vomiting, diarrhea, bleeding)        Nausea   11. PREGNANCY: \"Is there any chance you are pregnant?\" \"When was your last menstrual period?\"        Denies. Hysterectomy    Protocols used: Dizziness-ADULT-[place]    "

## 2024-05-02 NOTE — TELEPHONE ENCOUNTER
Called pt, advised of Dr. Newell's message below. Pt verbally understood. Will await for 05/22 OV to review anything else in detail.

## 2024-05-02 NOTE — TELEPHONE ENCOUNTER
----- Message from Naina Jamison sent at 5/2/2024  6:20 AM EDT -----  Regarding: Update  Contact: [phone]  Good morning,   I wanted to let you know that I am having dizziness while laying down. All my symptoms get worse when I am laying down. I was randomly getting dizzy spells but the past 2 nights I got them several times. So we can add that to the edema, shortness of breath or trouble taking a deep breath, heart palpitations and total fatigue. Also have been feeling nauseous spells.   Wanted to be sure I reporting things so you know what is going on. Really feels like my heart is struggling.   Thank you   Naina

## 2024-05-02 NOTE — TELEPHONE ENCOUNTER
Patient wants to inform you that she's had increased dizziness the past 2 nights when she lays down.  She is feeling like the room is spinning anytime she moves her head, changes body positioning.      Patient denies having dizziness when she wore the monitor.      Her weight this morning was 254 lbs.  She did not weigh herself yesterday.  She said the last few days she did her weight was in between 252-255 lbs.     Please advise.

## 2024-05-09 LAB — BNP SERPL-MCNC: 22 PG/ML

## 2024-05-13 DIAGNOSIS — E10.9 TYPE 1 DIABETES MELLITUS WITHOUT COMPLICATION (HCC): ICD-10-CM

## 2024-05-13 RX ORDER — INSULIN ASPART 100 [IU]/ML
INJECTION, SOLUTION INTRAVENOUS; SUBCUTANEOUS
Qty: 110 ML | Refills: 1 | Status: SHIPPED | OUTPATIENT
Start: 2024-05-13

## 2024-05-13 NOTE — TELEPHONE ENCOUNTER
Patient calling asking if novalog can be refilled urgently, states she is using insulin more quickly with the new metronic pump and sensor that she has. Advise patient I would change the request to high priority.

## 2024-05-22 ENCOUNTER — OFFICE VISIT (OUTPATIENT)
Dept: CARDIOLOGY CLINIC | Facility: CLINIC | Age: 49
End: 2024-05-22
Payer: COMMERCIAL

## 2024-05-22 VITALS
DIASTOLIC BLOOD PRESSURE: 68 MMHG | HEIGHT: 64 IN | BODY MASS INDEX: 44.42 KG/M2 | SYSTOLIC BLOOD PRESSURE: 122 MMHG | HEART RATE: 95 BPM | WEIGHT: 260.2 LBS | OXYGEN SATURATION: 97 %

## 2024-05-22 DIAGNOSIS — I50.33 ACUTE ON CHRONIC DIASTOLIC (CONGESTIVE) HEART FAILURE (HCC): Primary | ICD-10-CM

## 2024-05-22 DIAGNOSIS — Q23.1 BICUSPID AORTIC VALVE: ICD-10-CM

## 2024-05-22 DIAGNOSIS — I25.10 CORONARY ARTERY DISEASE INVOLVING NATIVE CORONARY ARTERY OF NATIVE HEART WITHOUT ANGINA PECTORIS: ICD-10-CM

## 2024-05-22 PROCEDURE — 99214 OFFICE O/P EST MOD 30 MIN: CPT | Performed by: INTERNAL MEDICINE

## 2024-05-22 RX ORDER — FUROSEMIDE 40 MG/1
40 TABLET ORAL DAILY
Qty: 30 TABLET | Refills: 3 | Status: SHIPPED | OUTPATIENT
Start: 2024-05-22

## 2024-05-22 NOTE — PROGRESS NOTES
Cardiology Outpatient Follow-Up Note - Naina Jamison 48 y.o. female MRN: [id number]      Assessment/Plan:    1. Acute on chronic diastolic (congestive) heart failure (HCC)  She has persistent symptoms of volume overload today  Again was only taking lasix 20 mg daily. States no good urine response.  Increase to lasix 40 mg daily  BMP in 2 weeks  NAPOLEON f/u in 4 weeks    2. Coronary artery disease involving native coronary artery of native heart without angina pectoris  Continue DAPT Until July 2024 then aspirin 81 mg daily  - furosemide (LASIX) 40 mg tablet; Take 1 tablet (40 mg total) by mouth daily  Dispense: 30 tablet; Refill: 3  - Basic metabolic panel; Future    3. Bicuspid aortic valve  Noted, stable, will do routine surveillance with echo      We will see Naina Jamison back in 3 months for routine follow-up.    Subjective:     HPI: Naina Jamison is a 48 y.o. year old female with morbid obesity, T1DM, HLD, and premature CAD with NSTEMI and PCI to LAD in July 2023 presenting for follow-up.     She presented with atypical symptoms SLB and found to have elevated troponin > 4000. She underwent cardiac cath 7/5/23 which showed a mid-LAD 95% culprit lesion which was stented (Xience Skypoint 3 x 33 mm KSENIA). Echo showed normal LV function and mild-moderate AS. We followed up with DARRICK on 9/7/23 which confirmed congenitally bicuspid AV.     She continues to feel tired. She also feels dyspneic and swollen  Seen by Kriss KHOURY, was advised to take lasix 20 mg daily but has only been taking daily. She has been using a wedge pillow.  When flat she has orthopnea.     Cardiac Testing:    Holter 4/25/24     IMPRESSION:     Predominantly normal sinus during the study period with an average HR of 78 bpm ( bpm).  Rare supraventricular and ventricular ectopy burden, mostly in the form of single PACs.   Patient diary was attached. None of the reported symptoms correlated with any significant arrhythmias.              Echo 4/25/24    Interpretation Summary  Show Result Comparison     Left Ventricle: Left ventricular cavity size is normal. Wall thickness is normal. The left ventricular ejection fraction is 60%. Systolic function is normal. Although no diagnostic regional wall motion abnormality was identified, this possibility cannot be completely excluded on the basis of this study. Diastolic function is normal.    Aortic Valve: There is moderate stenosis. The aortic valve peak velocity is 2.72 m/s. The aortic valve peak gradient is 30 mmHg. The aortic valve mean gradient is 16 mmHg. The dimensionless velocity index is 0.38. The aortic valve area is 1.51 cm2.    Technically difficult study.  Definity could not be given due to inability to obtain IV access.          DARRICK 3D 9/7/23    Interpretation Summary         Left Ventricle: Left ventricular cavity size is normal. Wall thickness is normal. The left ventricular ejection fraction is 60%. Systolic function is normal. Wall motion is normal.    Left Atrium: The atrium is dilated. There is no thrombus.    Atrial Septum: There is no atrial septal defect. No patent foramen ovale detected, confirmed at rest using color doppler.    Aortic Valve: The aortic valve is congenitally bicuspid. The leaflets are moderately calcified. There is mildly reduced mobility. There is systolic doming. There is moderate stenosis. The aortic valve peak velocity is 2.5 m/s. The aortic valve peak gradient is 24.0 mmHg. The aortic valve mean gradient is 15.0 mmHg. The dimensionless velocity index is 0.32. The aortic valve area is 1.08 cm2 by continuity VTI, and approximately 1.1 to 1.3 cm2 by planimetry. The stroke volume index is 30.40 ml/m2, suggestive of mildly decreased flow.    Aorta: The aortic root is normal in size. The ascending aorta is normal in size. The ascending aorta is 2.8 cm.     Bicuspid aortic valve anatomy demonstrated with leaflet calcification, a moderate degree of valvular  stenosis, and minimal regurgitation. Aortic root and ascending aorta are normal in size.             Cardiac Cath 7/5/23   Impression         Mid LAD lesion is 95% stenosed. Culprit of NSTEMI. S/P successful IVUS-guided PCI to the mid LAD with KSENIA x 1. Mild reisdual CAD amenable to GDMT.    LVEDP normal without gradient on LV-AO pullback     Recommendation    Cont GDMT optimization for CAD  DAPT for at least 12 months  Aggressive risk factor reduction   on diet/lifestyle modification  Cardiac rehab upon discharge       Echo 7/5/23 Interpretation Summary         Left Ventricle: Left ventricular cavity size is normal. Wall thickness is normal. The left ventricular ejection fraction is 65%. Systolic function is normal. Diastolic function is normal.    The following segments are hypokinetic: apical septal, apical inferior and apex.    All other segments are normal.    Aortic Valve: The aortic valve is possibly bicuspid. The leaflets are not thickened. The leaflets are not calcified. The leaflets exhibit normal mobility. There is mild to moderate stenosis. The aortic valve mean gradient is 17 mmHg. The dimensionless velocity index is 0.41. The aortic valve area is 1.60 cm2.          EKGs, personally reviewed:  N/a    Relevant Labs & Results:  CMP 7/26/23 -- reviewed, K 4.2, Cr 1.02  CBC 7/26/23 -- WNL  Lipid profile 7/05/23 -- LDL 33 mg/dL  HbA1c 7/5/23 -- 9.5%    CMP 5/8/24 - Cr 0.89, K 4.4  BNP 5/8/24 - 22  HbA1c 5/8/24 - 11%  Lipid panel 5/8/24 - LDL 49 mg/dL     ROS:  Review of Systems:  Review of Systems    Objective:     Vitals:   There were no vitals filed for this visit.   There is no height or weight on file to calculate BSA.  Wt Readings from Last 3 Encounters:   04/25/24 116 kg (256 lb)   04/18/24 116 kg (256 lb 11.2 oz)   04/11/24 114 kg (251 lb)       Physical Exam:  General: Naina Jamison is a morbidly obese female, in no acute distress, sitting comfortably  HEENT: moist mucous membranes,  EOMI  Neck:  Unable to assess JVD, supple, trachea midline  Cardiovascular: soft S2, RRR,   SM RUSB 3/6  Pulmonary: normal respiratory effort, CTAB  Abdomen: soft and nondistended  Extremities: +1 bilateral lower extremity edema. Warm and well perfused extremities.  Neuro: no focal motor deficits, AAOx3 (person, place, time)  Psych: Normal mood and affect, cooperative        Medications (at the START of this encounter):  Outpatient Medications Prior to Visit   Medication Sig Dispense Refill    ALPRAZolam (XANAX) 1 mg tablet Take 1 mg by mouth daily at bedtime as needed for anxiety Pt unable to verify dose for RN      aspirin (ECOTRIN LOW STRENGTH) 81 mg EC tablet Take 1 tablet (81 mg total) by mouth daily Do not start before July 7, 2023.  0    atorvastatin (LIPITOR) 40 mg tablet       cholecalciferol (VITAMIN D3) 1,000 units tablet Take 2,000 Units by mouth daily Pt unable to verify dose for RN      clopidogrel (Plavix) 75 mg tablet Take 1 tablet (75 mg total) by mouth daily 90 tablet 1    Continuous Blood Gluc Sensor (Guardian 4 Glucose Sensor) MISC Use 1 each every 7 days 15 each 0    Continuous Blood Gluc Transmit (Guardian 4 Transmitter) MISC To check blood sugars continuously 1 each 0    DULoxetine (CYMBALTA) 30 mg delayed release capsule Take 30 mg by mouth daily Pt unable to verify dose for RN      furosemide (LASIX) 20 mg tablet Take 1 tablet (20 mg total) by mouth 2 (two) times a day 60 tablet 5    HYDROcodone-acetaminophen (NORCO) 5-325 mg per tablet Take 1 tablet by mouth every 6 (six) hours as needed for pain Pt unable to verify dose for RN      lisinopril-hydrochlorothiazide (PRINZIDE,ZESTORETIC) 10-12.5 MG per tablet Take 1 tablet by mouth daily 90 tablet 3    nitroglycerin (NITROSTAT) 0.4 mg SL tablet Place 1 tablet (0.4 mg total) under the tongue every 5 (five) minutes as needed for chest pain 30 tablet 2    NovoLOG 100 UNIT/ML injection To be used in insulin pump approximately 120 units a day 110  "mL 1    PATIENT MAINTAINED INSULIN PUMP Inject 1 each under the skin every 8 (eight) hours      capsaicin (ZOSTRIX) 0.025 % cream Apply 1 Application topically 1 (one) time for 1 dose 120 g 2    hydrOXYzine HCL (ATARAX) 25 mg tablet Take 25 mg by mouth every 6 (six) hours as needed for itching Pt unable to verify dose for RN (Patient not taking: Reported on 4/11/2024)       No facility-administered medications prior to visit.                 Time Spent:  Total time (face-to-face and non-face-to-face) spent on today's visit was 21 minutes. This includes preparation for the visits (i.e. reviewing test results), performance of a medically appropriate history and examination, and orders for medications, tests or other procedures. This time is exclusive of procedures performed and time spent teaching.      This note was completed in part utilizing [employer] voice recognition software. Grammatical errors, random word insertion, spelling mistakes, occasional wrong word or \"sound-alike\" substitutions and incomplete sentences may be an occasional consequence of the system secondary to software limitations, ambient noise and hardware issues. At the time of dictation, efforts were made to edit, clarify and /or correct errors.  Please read the chart carefully and recognize, using context, where substitutions have occurred.  If you have any questions or concerns about the context, text or information contained within the body of this dictation, please contact myself, the provider, for further clarification.    "

## 2024-06-10 ENCOUNTER — APPOINTMENT (EMERGENCY)
Dept: RADIOLOGY | Facility: HOSPITAL | Age: 49
End: 2024-06-10
Payer: COMMERCIAL

## 2024-06-10 ENCOUNTER — HOSPITAL ENCOUNTER (EMERGENCY)
Facility: HOSPITAL | Age: 49
Discharge: HOME | End: 2024-06-10
Attending: EMERGENCY MEDICINE
Payer: COMMERCIAL

## 2024-06-10 VITALS
RESPIRATION RATE: 18 BRPM | SYSTOLIC BLOOD PRESSURE: 111 MMHG | DIASTOLIC BLOOD PRESSURE: 54 MMHG | OXYGEN SATURATION: 97 % | HEART RATE: 75 BPM

## 2024-06-10 DIAGNOSIS — R68.84 JAW PAIN: ICD-10-CM

## 2024-06-10 DIAGNOSIS — R51.9 ACUTE HEADACHE: Primary | ICD-10-CM

## 2024-06-10 DIAGNOSIS — I25.10 CAD (CORONARY ARTERY DISEASE): ICD-10-CM

## 2024-06-10 LAB
2HR DELTA HS TROPONIN: -1 NG/L
4HR DELTA HS TROPONIN: -2 NG/L
ANION GAP SERPL CALCULATED.3IONS-SCNC: 9 MMOL/L (ref 4–13)
ATRIAL RATE: 75 BPM
ATRIAL RATE: 83 BPM
ATRIAL RATE: 86 BPM
ATRIAL RATE: 95 BPM
BASOPHILS # BLD AUTO: 0.07 THOUSANDS/ÂΜL (ref 0–0.1)
BASOPHILS NFR BLD AUTO: 1 % (ref 0–1)
BUN SERPL-MCNC: 14 MG/DL (ref 5–25)
CALCIUM SERPL-MCNC: 9.8 MG/DL (ref 8.4–10.2)
CARDIAC TROPONIN I PNL SERPL HS: 4 NG/L
CARDIAC TROPONIN I PNL SERPL HS: 5 NG/L
CARDIAC TROPONIN I PNL SERPL HS: 6 NG/L
CHLORIDE SERPL-SCNC: 99 MMOL/L (ref 96–108)
CO2 SERPL-SCNC: 30 MMOL/L (ref 21–32)
CREAT SERPL-MCNC: 0.96 MG/DL (ref 0.6–1.3)
D DIMER PPP FEU-MCNC: 0.33 UG/ML FEU
EOSINOPHIL # BLD AUTO: 0.34 THOUSAND/ÂΜL (ref 0–0.61)
EOSINOPHIL NFR BLD AUTO: 4 % (ref 0–6)
ERYTHROCYTE [DISTWIDTH] IN BLOOD BY AUTOMATED COUNT: 11.9 % (ref 11.6–15.1)
GFR SERPL CREATININE-BSD FRML MDRD: 71 ML/MIN/1.73SQ M
GLUCOSE SERPL-MCNC: 139 MG/DL (ref 65–140)
HCT VFR BLD AUTO: 44.2 % (ref 34.8–46.1)
HGB BLD-MCNC: 14.9 G/DL (ref 11.5–15.4)
IMM GRANULOCYTES # BLD AUTO: 0.04 THOUSAND/UL (ref 0–0.2)
IMM GRANULOCYTES NFR BLD AUTO: 0 % (ref 0–2)
LYMPHOCYTES # BLD AUTO: 2.19 THOUSANDS/ÂΜL (ref 0.6–4.47)
LYMPHOCYTES NFR BLD AUTO: 24 % (ref 14–44)
MCH RBC QN AUTO: 29 PG (ref 26.8–34.3)
MCHC RBC AUTO-ENTMCNC: 33.7 G/DL (ref 31.4–37.4)
MCV RBC AUTO: 86 FL (ref 82–98)
MONOCYTES # BLD AUTO: 0.71 THOUSAND/ÂΜL (ref 0.17–1.22)
MONOCYTES NFR BLD AUTO: 8 % (ref 4–12)
NEUTROPHILS # BLD AUTO: 5.91 THOUSANDS/ÂΜL (ref 1.85–7.62)
NEUTS SEG NFR BLD AUTO: 63 % (ref 43–75)
NRBC BLD AUTO-RTO: 0 /100 WBCS
P AXIS: 25 DEGREES
P AXIS: 28 DEGREES
P AXIS: 29 DEGREES
P AXIS: 30 DEGREES
PLATELET # BLD AUTO: 282 THOUSANDS/UL (ref 149–390)
PMV BLD AUTO: 9.9 FL (ref 8.9–12.7)
POTASSIUM SERPL-SCNC: 3.8 MMOL/L (ref 3.5–5.3)
PR INTERVAL: 124 MS
PR INTERVAL: 126 MS
PR INTERVAL: 128 MS
PR INTERVAL: 128 MS
QRS AXIS: 62 DEGREES
QRS AXIS: 66 DEGREES
QRS AXIS: 67 DEGREES
QRS AXIS: 69 DEGREES
QRSD INTERVAL: 60 MS
QRSD INTERVAL: 62 MS
QRSD INTERVAL: 64 MS
QRSD INTERVAL: 64 MS
QT INTERVAL: 340 MS
QT INTERVAL: 362 MS
QT INTERVAL: 362 MS
QT INTERVAL: 392 MS
QTC INTERVAL: 425 MS
QTC INTERVAL: 427 MS
QTC INTERVAL: 433 MS
QTC INTERVAL: 437 MS
RBC # BLD AUTO: 5.14 MILLION/UL (ref 3.81–5.12)
SODIUM SERPL-SCNC: 138 MMOL/L (ref 135–147)
T WAVE AXIS: 46 DEGREES
T WAVE AXIS: 53 DEGREES
T WAVE AXIS: 58 DEGREES
T WAVE AXIS: 63 DEGREES
VENTRICULAR RATE: 75 BPM
VENTRICULAR RATE: 83 BPM
VENTRICULAR RATE: 86 BPM
VENTRICULAR RATE: 95 BPM
WBC # BLD AUTO: 9.26 THOUSAND/UL (ref 4.31–10.16)

## 2024-06-10 PROCEDURE — 71046 X-RAY EXAM CHEST 2 VIEWS: CPT

## 2024-06-10 PROCEDURE — 84484 ASSAY OF TROPONIN QUANT: CPT | Performed by: EMERGENCY MEDICINE

## 2024-06-10 PROCEDURE — 93010 ELECTROCARDIOGRAM REPORT: CPT | Performed by: STUDENT IN AN ORGANIZED HEALTH CARE EDUCATION/TRAINING PROGRAM

## 2024-06-10 PROCEDURE — 85025 COMPLETE CBC W/AUTO DIFF WBC: CPT | Performed by: EMERGENCY MEDICINE

## 2024-06-10 PROCEDURE — 99285 EMERGENCY DEPT VISIT HI MDM: CPT

## 2024-06-10 PROCEDURE — 96375 TX/PRO/DX INJ NEW DRUG ADDON: CPT

## 2024-06-10 PROCEDURE — 36415 COLL VENOUS BLD VENIPUNCTURE: CPT | Performed by: EMERGENCY MEDICINE

## 2024-06-10 PROCEDURE — 93010 ELECTROCARDIOGRAM REPORT: CPT

## 2024-06-10 PROCEDURE — 96361 HYDRATE IV INFUSION ADD-ON: CPT

## 2024-06-10 PROCEDURE — 85379 FIBRIN DEGRADATION QUANT: CPT | Performed by: EMERGENCY MEDICINE

## 2024-06-10 PROCEDURE — 80048 BASIC METABOLIC PNL TOTAL CA: CPT | Performed by: EMERGENCY MEDICINE

## 2024-06-10 PROCEDURE — 96365 THER/PROPH/DIAG IV INF INIT: CPT

## 2024-06-10 PROCEDURE — 99285 EMERGENCY DEPT VISIT HI MDM: CPT | Performed by: EMERGENCY MEDICINE

## 2024-06-10 PROCEDURE — 93005 ELECTROCARDIOGRAM TRACING: CPT

## 2024-06-10 RX ORDER — KETOROLAC TROMETHAMINE 30 MG/ML
30 INJECTION, SOLUTION INTRAMUSCULAR; INTRAVENOUS ONCE
Status: COMPLETED | OUTPATIENT
Start: 2024-06-10 | End: 2024-06-10

## 2024-06-10 RX ORDER — SODIUM CHLORIDE 9 MG/ML
3 INJECTION INTRAVENOUS
Status: DISCONTINUED | OUTPATIENT
Start: 2024-06-10 | End: 2024-06-10 | Stop reason: HOSPADM

## 2024-06-10 RX ORDER — ASPIRIN 81 MG/1
162 TABLET, CHEWABLE ORAL ONCE
Status: COMPLETED | OUTPATIENT
Start: 2024-06-10 | End: 2024-06-10

## 2024-06-10 RX ORDER — ONDANSETRON 4 MG/1
4 TABLET, ORALLY DISINTEGRATING ORAL ONCE
Status: COMPLETED | OUTPATIENT
Start: 2024-06-10 | End: 2024-06-10

## 2024-06-10 RX ORDER — METOCLOPRAMIDE HYDROCHLORIDE 5 MG/ML
10 INJECTION INTRAMUSCULAR; INTRAVENOUS ONCE
Status: COMPLETED | OUTPATIENT
Start: 2024-06-10 | End: 2024-06-10

## 2024-06-10 RX ORDER — DEXAMETHASONE SODIUM PHOSPHATE 10 MG/ML
10 INJECTION, SOLUTION INTRAMUSCULAR; INTRAVENOUS ONCE
Status: COMPLETED | OUTPATIENT
Start: 2024-06-10 | End: 2024-06-10

## 2024-06-10 RX ORDER — DIPHENHYDRAMINE HYDROCHLORIDE 50 MG/ML
25 INJECTION INTRAMUSCULAR; INTRAVENOUS ONCE
Status: COMPLETED | OUTPATIENT
Start: 2024-06-10 | End: 2024-06-10

## 2024-06-10 RX ORDER — MAGNESIUM SULFATE HEPTAHYDRATE 40 MG/ML
2 INJECTION, SOLUTION INTRAVENOUS ONCE
Status: COMPLETED | OUTPATIENT
Start: 2024-06-10 | End: 2024-06-10

## 2024-06-10 RX ORDER — NITROGLYCERIN 0.4 MG/1
0.4 TABLET SUBLINGUAL
Status: DISCONTINUED | OUTPATIENT
Start: 2024-06-10 | End: 2024-06-10 | Stop reason: HOSPADM

## 2024-06-10 RX ORDER — MORPHINE SULFATE 4 MG/ML
4 INJECTION, SOLUTION INTRAMUSCULAR; INTRAVENOUS ONCE
Status: COMPLETED | OUTPATIENT
Start: 2024-06-10 | End: 2024-06-10

## 2024-06-10 RX ADMIN — DEXAMETHASONE SODIUM PHOSPHATE 10 MG: 10 INJECTION INTRAMUSCULAR; INTRAVENOUS at 14:13

## 2024-06-10 RX ADMIN — MORPHINE SULFATE 4 MG: 4 INJECTION INTRAVENOUS at 12:56

## 2024-06-10 RX ADMIN — NITROGLYCERIN 0.4 MG: 0.4 TABLET, ORALLY DISINTEGRATING SUBLINGUAL at 11:07

## 2024-06-10 RX ADMIN — MAGNESIUM SULFATE HEPTAHYDRATE 2 G: 40 INJECTION, SOLUTION INTRAVENOUS at 14:55

## 2024-06-10 RX ADMIN — NITROGLYCERIN 0.4 MG: 0.4 TABLET, ORALLY DISINTEGRATING SUBLINGUAL at 10:57

## 2024-06-10 RX ADMIN — DIPHENHYDRAMINE HYDROCHLORIDE 25 MG: 50 INJECTION, SOLUTION INTRAMUSCULAR; INTRAVENOUS at 14:12

## 2024-06-10 RX ADMIN — METOCLOPRAMIDE 10 MG: 5 INJECTION, SOLUTION INTRAMUSCULAR; INTRAVENOUS at 14:13

## 2024-06-10 RX ADMIN — KETOROLAC TROMETHAMINE 30 MG: 30 INJECTION, SOLUTION INTRAMUSCULAR; INTRAVENOUS at 11:26

## 2024-06-10 RX ADMIN — SODIUM CHLORIDE 500 ML: 0.9 INJECTION, SOLUTION INTRAVENOUS at 14:21

## 2024-06-10 RX ADMIN — ASPIRIN 81 MG CHEWABLE TABLET 162 MG: 81 TABLET CHEWABLE at 10:41

## 2024-06-10 RX ADMIN — ONDANSETRON 4 MG: 4 TABLET, ORALLY DISINTEGRATING ORAL at 10:41

## 2024-06-10 NOTE — DISCHARGE INSTRUCTIONS
Follow up with Cardiology as previously scheduled.    Follow up with your Primary Care Provider if you have recurrent headaches, jaw and/or neck pain, or for any concerns.

## 2024-06-10 NOTE — ED PROVIDER NOTES
History  Chief Complaint   Patient presents with    Jaw Pain     Pt concerned due to previous MI that she may be having another due to similar symptoms.  Pt reports R sided jaw and neck pain, swelling noted to both.  Pt also reports peripheral edema to R arm and recently started Lasix.      48y F here with complaints of jaw/neck pain. Discomfort woke her overnight w/ tightness in the jaw/neck region.  Since then, symptoms have progressed and now w/ radiation up into the posterior neck/head causing a headache and down the right arm.  Discomfort is 8/10, assoc w/ nausea, lh, intermittent diaphoresis, intermittent SOB.  Pt reports previous MI w/ stent last year and states this feels the same as the previous heart attack      History provided by:  Patient   used: No        Prior to Admission Medications   Prescriptions Last Dose Informant Patient Reported? Taking?   ALPRAZolam (XANAX) 1 mg tablet  Self Yes No   Sig: Take 1 mg by mouth daily at bedtime as needed for anxiety Pt unable to verify dose for RN   Continuous Blood Gluc Sensor (Guardian 4 Glucose Sensor) MISC  Self No No   Sig: Use 1 each every 7 days   Continuous Blood Gluc Transmit (Guardian 4 Transmitter) MISC  Self No No   Sig: To check blood sugars continuously   DULoxetine (CYMBALTA) 30 mg delayed release capsule  Self Yes No   Sig: Take 30 mg by mouth daily Pt unable to verify dose for RN   HYDROcodone-acetaminophen (NORCO) 5-325 mg per tablet  Self Yes No   Sig: Take 1 tablet by mouth every 6 (six) hours as needed for pain Pt unable to verify dose for RN   NovoLOG 100 UNIT/ML injection  Self No No   Sig: To be used in insulin pump approximately 120 units a day   PATIENT MAINTAINED INSULIN PUMP  Self Yes No   Sig: Inject 1 each under the skin every 8 (eight) hours   aspirin (ECOTRIN LOW STRENGTH) 81 mg EC tablet  Self No No   Sig: Take 1 tablet (81 mg total) by mouth daily Do not start before July 7, 2023.   atorvastatin (LIPITOR) 40  mg tablet  Self Yes No   capsaicin (ZOSTRIX) 0.025 % cream   No No   Sig: Apply 1 Application topically 1 (one) time for 1 dose   cholecalciferol (VITAMIN D3) 1,000 units tablet  Self Yes No   Sig: Take 2,000 Units by mouth daily Pt unable to verify dose for RN   clopidogrel (Plavix) 75 mg tablet  Self No No   Sig: Take 1 tablet (75 mg total) by mouth daily   furosemide (LASIX) 40 mg tablet   No No   Sig: Take 1 tablet (40 mg total) by mouth daily   hydrOXYzine HCL (ATARAX) 25 mg tablet  Self Yes No   Sig: Take 25 mg by mouth every 6 (six) hours as needed for itching Pt unable to verify dose for RN   Patient not taking: Reported on 4/11/2024   lisinopril-hydrochlorothiazide (PRINZIDE,ZESTORETIC) 10-12.5 MG per tablet  Self No No   Sig: Take 1 tablet by mouth daily   nitroglycerin (NITROSTAT) 0.4 mg SL tablet  Self No No   Sig: Place 1 tablet (0.4 mg total) under the tongue every 5 (five) minutes as needed for chest pain      Facility-Administered Medications: None       Past Medical History:   Diagnosis Date    Diabetes mellitus (HCC)     Graves disease     High cholesterol     Hypertension        Past Surgical History:   Procedure Laterality Date    ABDOMINAL SURGERY      Gallbladder removal    CARDIAC CATHETERIZATION Left 7/5/2023    Procedure: Cardiac Left Heart Cath;  Surgeon: Elba Nevarez DO;  Location: [facility];  Service: Cardiology    CARDIAC CATHETERIZATION N/A 7/5/2023    Procedure: Cardiac Coronary Angiogram;  Surgeon: Elba Nevarez DO;  Location: [facility];  Service: Cardiology    HYSTERECTOMY      US GUIDED THYROID BIOPSY  10/4/2018       History reviewed. No pertinent family history.  I have reviewed and agree with the history as documented.    E-Cigarette/Vaping    E-Cigarette Use Never User      E-Cigarette/Vaping Substances    Nicotine No     THC No     CBD No     Flavoring No     Other No     Unknown No      Social History     Tobacco Use    Smoking status: Never     Smokeless tobacco: Never   Vaping Use    Vaping status: Never Used   Substance Use Topics    Alcohol use: Never    Drug use: Never       Review of Systems   All other systems reviewed and are negative.      Physical Exam  Physical Exam  Vitals and nursing note reviewed.   Constitutional:       Appearance: Normal appearance.      Comments: Morbidly obese w/ BMI 44.66   HENT:      Nose: Nose normal.      Mouth/Throat:      Mouth: Mucous membranes are moist.   Eyes:      Conjunctiva/sclera: Conjunctivae normal.   Cardiovascular:      Rate and Rhythm: Normal rate and regular rhythm.      Heart sounds: No murmur heard.  Pulmonary:      Effort: Pulmonary effort is normal.   Musculoskeletal:         General: No swelling.      Cervical back: Normal range of motion.   Skin:     General: Skin is warm.   Neurological:      General: No focal deficit present.      Mental Status: She is alert and oriented to person, place, and time.      Cranial Nerves: No cranial nerve deficit.      Sensory: No sensory deficit.      Motor: No weakness.   Psychiatric:         Mood and Affect: Mood normal.         Vital Signs  ED Triage Vitals   Temp Pulse Respirations Blood Pressure SpO2   -- 06/10/24 1000 06/10/24 1000 06/10/24 1000 06/10/24 1000    94 20 160/73 96 %      Temp src Heart Rate Source Patient Position - Orthostatic VS BP Location FiO2 (%)   -- 06/10/24 1000 06/10/24 1000 06/10/24 1000 --    Monitor Lying Right arm       Pain Score       06/10/24 1107       8           Vitals:    06/10/24 1200 06/10/24 1500 06/10/24 1530 06/10/24 1549   BP: 112/57 111/56 105/56 111/54   Pulse: 82 75 70 75   Patient Position - Orthostatic VS: Lying Lying Lying Lying         Visual Acuity      ED Medications  Medications   sodium chloride (PF) 0.9 % injection 3 mL (has no administration in time range)   nitroglycerin (NITROSTAT) SL tablet 0.4 mg (0.4 mg Sublingual Given 6/10/24 1107)   aspirin chewable tablet 162 mg (162 mg Oral Given 6/10/24 1041)    ondansetron (ZOFRAN-ODT) dispersible tablet 4 mg (4 mg Oral Given 6/10/24 1041)   ketorolac (TORADOL) injection 30 mg (30 mg Intravenous Given 6/10/24 1126)   morphine injection 4 mg (4 mg Intravenous Given 6/10/24 1256)   dexamethasone (PF) (DECADRON) injection 10 mg (10 mg Intravenous Given 6/10/24 1413)   metoclopramide (REGLAN) injection 10 mg (10 mg Intravenous Given 6/10/24 1413)   diphenhydrAMINE (BENADRYL) injection 25 mg (25 mg Intravenous Given 6/10/24 1412)   magnesium sulfate 2 g/50 mL IVPB (premix) 2 g (0 g Intravenous Stopped 6/10/24 1548)   sodium chloride 0.9 % bolus 500 mL (0 mL Intravenous Stopped 6/10/24 1548)       Diagnostic Studies  Results Reviewed       Procedure Component Value Units Date/Time    HS Troponin I 4hr [[id number]]  (Normal) Collected: 06/10/24 1454    Lab Status: Final result Specimen: Blood from Arm, Right Updated: 06/10/24 1531     hs TnI 4hr 4 ng/L      Delta 4hr hsTnI -2 ng/L     HS Troponin I 2hr [[id number]]  (Normal) Collected: 06/10/24 1305    Lab Status: Final result Specimen: Blood from Arm, Right Updated: 06/10/24 1334     hs TnI 2hr 5 ng/L      Delta 2hr hsTnI -1 ng/L     D-dimer, quantitative [[id number]]  (Normal) Collected: 06/10/24 1120    Lab Status: Final result Specimen: Blood from Arm, Right Updated: 06/10/24 1142     D-Dimer, Quant 0.33 ug/ml FEU     HS Troponin 0hr (reflex protocol) [[id number]]  (Normal) Collected: 06/10/24 1059    Lab Status: Final result Specimen: Blood from Arm, Right Updated: 06/10/24 1131     hs TnI 0hr 6 ng/L     Basic metabolic panel [[id number]] Collected: 06/10/24 1059    Lab Status: Final result Specimen: Blood from Arm, Right Updated: 06/10/24 1127     Sodium 138 mmol/L      Potassium 3.8 mmol/L      Chloride 99 mmol/L      CO2 30 mmol/L      ANION GAP 9 mmol/L      BUN 14 mg/dL      Creatinine 0.96 mg/dL      Glucose 139 mg/dL      Calcium 9.8 mg/dL      eGFR 71 ml/min/1.73sq m     Narrative:      National Kidney Disease  Foundation guidelines for Chronic Kidney Disease (CKD):     Stage 1 with normal or high GFR (GFR > 90 mL/min/1.73 square meters)    Stage 2 Mild CKD (GFR = 60-89 mL/min/1.73 square meters)    Stage 3A Moderate CKD (GFR = 45-59 mL/min/1.73 square meters)    Stage 3B Moderate CKD (GFR = 30-44 mL/min/1.73 square meters)    Stage 4 Severe CKD (GFR = 15-29 mL/min/1.73 square meters)    Stage 5 End Stage CKD (GFR <15 mL/min/1.73 square meters)  Note: GFR calculation is accurate only with a steady state creatinine    CBC and differential [[id number]]  (Abnormal) Collected: 06/10/24 1059    Lab Status: Final result Specimen: Blood from Arm, Right Updated: 06/10/24 1108     WBC 9.26 Thousand/uL      RBC 5.14 Million/uL      Hemoglobin 14.9 g/dL      Hematocrit 44.2 %      MCV 86 fL      MCH 29.0 pg      MCHC 33.7 g/dL      RDW 11.9 %      MPV 9.9 fL      Platelets 282 Thousands/uL      nRBC 0 /100 WBCs      Segmented % 63 %      Immature Grans % 0 %      Lymphocytes % 24 %      Monocytes % 8 %      Eosinophils Relative 4 %      Basophils Relative 1 %      Absolute Neutrophils 5.91 Thousands/µL      Absolute Immature Grans 0.04 Thousand/uL      Absolute Lymphocytes 2.19 Thousands/µL      Absolute Monocytes 0.71 Thousand/µL      Eosinophils Absolute 0.34 Thousand/µL      Basophils Absolute 0.07 Thousands/µL                    XR chest 2 views   ED Interpretation by Vicky Recinos DO (06/10 1236)   Xray reviewed and independently interpreted by me: no acute findings        Final Result by Isiah Jung MD (06/10 9349)      No acute cardiopulmonary disease.            Workstation performed: [id number]                    Procedures  ECG 12 Lead Documentation Only    Date/Time: 6/10/2024 10:10 AM    Performed by: Vicky Recinos DO  Authorized by: Vicky Recinos DO    Indications / Diagnosis:  Jaw / neck pain  ECG reviewed by me, the ED Provider: yes    Patient location:  ED  Previous ECG:     Previous ECG:  Compared to  current    Similarity:  Changes noted  Interpretation:     Interpretation: normal    Rate:     ECG rate:  95    ECG rate assessment: normal    Rhythm:     Rhythm: sinus rhythm    Ectopy:     Ectopy: none    Conduction:     Conduction: normal    ST segments:     ST segments:  Normal  ECG 12 Lead Documentation Only    Date/Time: 6/10/2024 12:40 PM    Performed by: Vicky Recinos DO  Authorized by: Vicky Recinos DO    Indications / Diagnosis:  Delta  ECG reviewed by me, the ED Provider: yes    Patient location:  ED  Previous ECG:     Previous ECG:  Compared to current    Similarity:  No change  Interpretation:     Interpretation: normal    Rate:     ECG rate:  83    ECG rate assessment: normal    Rhythm:     Rhythm: sinus rhythm    Ectopy:     Ectopy: none    ST segments:     ST segments:  Normal  T waves:     T waves: normal             ED Course  ED Course as of 06/10/24 1625   Mon Emeterio 10, 2024   1143 hs TnI 0hr: 6  Delta due at 1259   1154 D-Dimer, Quant: 0.33  No need for CTA at this time   1240 D/w pt current lab/rad/EKG results.  Awaiting delta.  Once delta returned, will contact cards.    Pt reports pressure/tightness has improved, but still w/ headache and pain.   1354 Delta 2hr hsTnI: -1  Given cardiac hx will d/w cardiology.    D/w pt continued HA/left sided pain - will try migraine/HA cocktail while awaiting cards   1424 D/w Cardiology - agrees w/ no need for admission and okay to f/u w/ cards as outpt             HEART Risk Score      Flowsheet Row Most Recent Value   Heart Score Risk Calculator    History 0 Filed at: 06/10/2024 1354   ECG 0 Filed at: 06/10/2024 1354   Age 1 Filed at: 06/10/2024 1354   Risk Factors 2 Filed at: 06/10/2024 1354   Troponin 0 Filed at: 06/10/2024 1354   HEART Score 3 Filed at: 06/10/2024 1354                          SBIRT 22yo+      Flowsheet Row Most Recent Value   Initial Alcohol Screen: US AUDIT-C     1. How often do you have a drink containing alcohol? 1 Filed at:  06/10/2024 1512   2. How many drinks containing alcohol do you have on a typical day you are drinking?  0 Filed at: 06/10/2024 1512   3a. Male UNDER 65: How often do you have five or more drinks on one occasion? 0 Filed at: 06/10/2024 1512   3b. FEMALE Any Age, or MALE 65+: How often do you have 4 or more drinks on one occassion? 0 Filed at: 06/10/2024 1512   Audit-C Score 1 Filed at: 06/10/2024 1512   OTIS: How many times in the past year have you...    Used an illegal drug or used a prescription medication for non-medical reasons? Never Filed at: 06/10/2024 1512                      Medical Decision Making  Pt w/ MI last year w/ reported similar symptoms to today along w/ developing a posterior headache.  Will get EKG to r/o arrhythmia, ischemic changes.  Will get labs to r/o acute life threatening metabolic abnl, cardiac ischemia, significant anemia.  Will get CXR to r/o occult pna, pulm edema.  Given hx will give asa, ntg and re-eval.    Problems Addressed:  Acute headache: acute illness or injury  CAD (coronary artery disease): chronic illness or injury  Jaw pain: acute illness or injury that poses a threat to life or bodily functions     Details: No evidence of acute ischemic changes. D/w cardiology - okay to f/u w/ her outpt cardiologist as previously scheduled    Amount and/or Complexity of Data Reviewed  Independent Historian: spouse  External Data Reviewed: ECG and notes.     Details: EKG from July 2023 reviewed  Notes from July 2023 admission reviewed  Labs: ordered. Decision-making details documented in ED Course.  Radiology: ordered and independent interpretation performed.  ECG/medicine tests: ordered and independent interpretation performed.  Discussion of management or test interpretation with external provider(s): D/w Dr. Barrera, cardiology    Risk  OTC drugs.  Prescription drug management.             Disposition  Final diagnoses:   Acute headache   Jaw pain   CAD (coronary artery disease)      Time reflects when diagnosis was documented in both MDM as applicable and the Disposition within this note       Time User Action Codes Description Comment    6/10/2024  3:43 PM Vicky Recinos Add [R51.9] Acute headache     6/10/2024  3:43 PM Vicky Recinos Add [R68.84] Jaw pain     6/10/2024  3:43 PM Vicky Recinos Add [I25.10] CAD (coronary artery disease)           ED Disposition       ED Disposition   Discharge    Condition   Stable    Date/Time   Mon Emeterio 10, 2024 1543    Comment   Naina Shaheen discharge to home/self care.                   Follow-up Information       Follow up With Specialties Details Why Contact Info Additional Information    [facility] On 6/19/2024 as previously scheduled [address]  [facility] [address] [phone] [facility], [address], [place], [address] [phone]    Antione Pratt MD Internal Medicine Schedule an appointment as soon as possible for a visit  If symptoms worsen or if no improvement [address] [facility]  [address]  [facility] [address] [phone]               Discharge Medication List as of 6/10/2024  3:46 PM        CONTINUE these medications which have NOT CHANGED    Details   ALPRAZolam (XANAX) 1 mg tablet Take 1 mg by mouth daily at bedtime as needed for anxiety Pt unable to verify dose for RN, Historical Med      aspirin (ECOTRIN LOW STRENGTH) 81 mg EC tablet Take 1 tablet (81 mg total) by mouth daily Do not start before July 7, 2023., Starting Fri 7/7/2023, No Print      atorvastatin (LIPITOR) 40 mg tablet Historical Med      capsaicin (ZOSTRIX) 0.025 % cream Apply 1 Application topically 1 (one) time for 1 dose, Starting Fri 11/10/2023, Normal      cholecalciferol (VITAMIN D3) 1,000 units tablet Take 2,000 Units by mouth daily Pt unable to verify dose for RN, Historical Med      clopidogrel (Plavix) 75 mg tablet Take 1 tablet (75 mg  total) by mouth daily, Starting Wed 3/6/2024, Normal      Continuous Blood Gluc Sensor (Guardian 4 Glucose Sensor) MISC Use 1 each every 7 days, Starting Sun 4/7/2024, Until Sat 7/6/2024, Normal      Continuous Blood Gluc Transmit (Guardian 4 Transmitter) MISC To check blood sugars continuously, Normal      DULoxetine (CYMBALTA) 30 mg delayed release capsule Take 30 mg by mouth daily Pt unable to verify dose for RN, Historical Med      furosemide (LASIX) 40 mg tablet Take 1 tablet (40 mg total) by mouth daily, Starting Wed 5/22/2024, Normal      HYDROcodone-acetaminophen (NORCO) 5-325 mg per tablet Take 1 tablet by mouth every 6 (six) hours as needed for pain Pt unable to verify dose for RN, Historical Med      hydrOXYzine HCL (ATARAX) 25 mg tablet Take 25 mg by mouth every 6 (six) hours as needed for itching Pt unable to verify dose for RN, Historical Med      lisinopril-hydrochlorothiazide (PRINZIDE,ZESTORETIC) 10-12.5 MG per tablet Take 1 tablet by mouth daily, Starting Wed 7/12/2023, Normal      nitroglycerin (NITROSTAT) 0.4 mg SL tablet Place 1 tablet (0.4 mg total) under the tongue every 5 (five) minutes as needed for chest pain, Starting Wed 7/12/2023, Normal      NovoLOG 100 UNIT/ML injection To be used in insulin pump approximately 120 units a day, Normal      PATIENT MAINTAINED INSULIN PUMP Inject 1 each under the skin every 8 (eight) hours, Historical Med             No discharge procedures on file.    PDMP Review         Value Time User    PDMP Reviewed  Yes 6/10/2024 11:52 AM Vicky Recinos DO            ED Provider  Electronically Signed by             Vicky Recinos DO  06/10/24 2696

## 2024-06-12 ENCOUNTER — TELEPHONE (OUTPATIENT)
Dept: CARDIOLOGY CLINIC | Facility: CLINIC | Age: 49
End: 2024-06-12

## 2024-06-12 ENCOUNTER — NURSE TRIAGE (OUTPATIENT)
Age: 49
End: 2024-06-12

## 2024-06-12 ENCOUNTER — TELEPHONE (OUTPATIENT)
Age: 49
End: 2024-06-12

## 2024-06-12 NOTE — TELEPHONE ENCOUNTER
"Patient was in the ED on 6/10.  C/o symptoms similar to when she had an MI.  Labs, EKG done.  Patient said she had throat pain which extended to her jaw.  She said she if feeling a little better, but is very fatigued, has edema \" all over\".  She is taking lasix as prescribed and weighs herself daily. She said her weight hasn't changed.    I scheduled her to see you tomorrow in the [place] office. She will return to the ED if symptoms worsen.  "

## 2024-06-12 NOTE — TELEPHONE ENCOUNTER
Regarding: Swelling/Lightheadedness  ----- Message from Vicky LOPEZ sent at 6/12/2024 10:44 AM EDT -----  Patient called and expressed she was in the ER , is having alot of swelling and lightheadedness   would like speak to someone because she abdias feel like the ER was aware of her history she is feeling  lethargic SOB Slept for 16 hour and is wore down & ER  basically told her she was having a headache and sent her home.

## 2024-06-12 NOTE — TELEPHONE ENCOUNTER
A user error has taken place: encounter opened in error, closed for administrative reasons      Needs to be call hub .

## 2024-06-13 ENCOUNTER — DOCUMENTATION (OUTPATIENT)
Dept: CARDIOLOGY CLINIC | Facility: CLINIC | Age: 49
End: 2024-06-13

## 2024-06-13 ENCOUNTER — OFFICE VISIT (OUTPATIENT)
Dept: CARDIOLOGY CLINIC | Facility: CLINIC | Age: 49
End: 2024-06-13
Payer: COMMERCIAL

## 2024-06-13 VITALS
BODY MASS INDEX: 44.94 KG/M2 | DIASTOLIC BLOOD PRESSURE: 66 MMHG | WEIGHT: 263.2 LBS | SYSTOLIC BLOOD PRESSURE: 126 MMHG | OXYGEN SATURATION: 98 % | HEART RATE: 92 BPM | HEIGHT: 64 IN

## 2024-06-13 DIAGNOSIS — I25.10 CORONARY ARTERY DISEASE INVOLVING NATIVE CORONARY ARTERY OF NATIVE HEART WITHOUT ANGINA PECTORIS: ICD-10-CM

## 2024-06-13 DIAGNOSIS — I10 ESSENTIAL HYPERTENSION: Primary | ICD-10-CM

## 2024-06-13 DIAGNOSIS — I50.33 ACUTE ON CHRONIC DIASTOLIC (CONGESTIVE) HEART FAILURE (HCC): ICD-10-CM

## 2024-06-13 PROCEDURE — 96374 THER/PROPH/DIAG INJ IV PUSH: CPT

## 2024-06-13 PROCEDURE — 99215 OFFICE O/P EST HI 40 MIN: CPT | Performed by: INTERNAL MEDICINE

## 2024-06-13 RX ORDER — FUROSEMIDE 80 MG
80 TABLET ORAL DAILY
Qty: 30 TABLET | Refills: 3 | Status: SHIPPED | OUTPATIENT
Start: 2024-06-13 | End: 2024-06-18

## 2024-06-13 RX ORDER — SPIRONOLACTONE 25 MG/1
25 TABLET ORAL DAILY
Qty: 30 TABLET | Refills: 11 | Status: SHIPPED | OUTPATIENT
Start: 2024-06-13

## 2024-06-13 RX ORDER — FUROSEMIDE 10 MG/ML
40 INJECTION INTRAMUSCULAR; INTRAVENOUS ONCE
Status: DISCONTINUED | OUTPATIENT
Start: 2024-06-13 | End: 2024-06-13

## 2024-06-13 RX ORDER — LISINOPRIL 10 MG/1
10 TABLET ORAL DAILY
Qty: 90 TABLET | Refills: 3 | Status: SHIPPED | OUTPATIENT
Start: 2024-06-13

## 2024-06-13 RX ADMIN — FUROSEMIDE 40 MG: 10 INJECTION INTRAMUSCULAR; INTRAVENOUS at 16:30

## 2024-06-13 NOTE — PROGRESS NOTES
Cardiology Outpatient Follow-Up Note - Naina Jamison 48 y.o. female MRN: [id number]      Assessment/Plan:    1. Acute on chronic diastolic (congestive) heart failure (HCC)  She has persistent symptoms of volume overload today  Failed increase of lasix to 40 mg daily   Given lasix 40 mg IV once in office today with 700 cc UOP  Continue lasix 40 mg daily with addition of spironolactone 25 mg daily  If does not continue to diurese increase lasix to 80 mg daily      2. Coronary artery disease involving native coronary artery of native heart without angina pectoris  Continue DAPT Until July 2024 then aspirin 81 mg daily              We will see Naina Jamison back in 4 weeks  for follow-up.    Subjective:     HPI: Naina Jamison is a 48 y.o. year old female with morbid obesity, T1DM, HLD, and premature CAD with NSTEMI and PCI to LAD in July 2023 presenting for follow-up.     She presented with atypical symptoms SLB and found to have elevated troponin > 4000. She underwent cardiac cath 7/5/23 which showed a mid-LAD 95% culprit lesion which was stented (Xience Skypoint 3 x 33 mm KSENIA). Echo showed normal LV function and mild-moderate AS. We followed up with [facility] on 9/7/23 which confirmed congenitally bicuspid AV.     Last visit she was having symptoms of hypervolemia and we increased lasix to 40 mg daily. Unfortunately weight continued to trend up since then from 260 lbs to 263 lbs.     She was seen at [facility] ER 6/10/24 with chest and neck tightness/pain, short of breath, etc similar to presentation with MI in 2023. She ruled out for MI with negative troponins x2.     Still having symptoms of swelling and chest pressure    Cardiac Testing:    Holter 4/25/24     IMPRESSION:     Predominantly normal sinus during the study period with an average HR of 78 bpm ( bpm).  Rare supraventricular and ventricular ectopy burden, mostly in the form of single PACs.   Patient diary was attached. None of the  reported symptoms correlated with any significant arrhythmias.             Echo 4/25/24    Interpretation Summary  Show Result Comparison     Left Ventricle: Left ventricular cavity size is normal. Wall thickness is normal. The left ventricular ejection fraction is 60%. Systolic function is normal. Although no diagnostic regional wall motion abnormality was identified, this possibility cannot be completely excluded on the basis of this study. Diastolic function is normal.    Aortic Valve: There is moderate stenosis. The aortic valve peak velocity is 2.72 m/s. The aortic valve peak gradient is 30 mmHg. The aortic valve mean gradient is 16 mmHg. The dimensionless velocity index is 0.38. The aortic valve area is 1.51 cm2.    Technically difficult study.  Definity could not be given due to inability to obtain IV access.          D[facility]ICK 3D 9/7/23    Interpretation Summary         Left Ventricle: Left ventricular cavity size is normal. Wall thickness is normal. The left ventricular ejection fraction is 60%. Systolic function is normal. Wall motion is normal.    Left Atrium: The atrium is dilated. There is no thrombus.    Atrial Septum: There is no atrial septal defect. No patent foramen ovale detected, confirmed at rest using color doppler.    Aortic Valve: The aortic valve is congenitally bicuspid. The leaflets are moderately calcified. There is mildly reduced mobility. There is systolic doming. There is moderate stenosis. The aortic valve peak velocity is 2.5 m/s. The aortic valve peak gradient is 24.0 mmHg. The aortic valve mean gradient is 15.0 mmHg. The dimensionless velocity index is 0.32. The aortic valve area is 1.08 cm2 by continuity VTI, and approximately 1.1 to 1.3 cm2 by planimetry. The stroke volume index is 30.40 ml/m2, suggestive of mildly decreased flow.    Aorta: The aortic root is normal in size. The ascending aorta is normal in size. The ascending aorta is 2.8 cm.     Bicuspid aortic valve anatomy  "demonstrated with leaflet calcification, a moderate degree of valvular stenosis, and minimal regurgitation. Aortic root and ascending aorta are normal in size.             Cardiac Cath 7/5/23   Impression         Mid LAD lesion is 95% stenosed. Culprit of NSTEMI. S/P successful IVUS-guided PCI to the mid LAD with KSENIA x 1. Mild reisdual CAD amenable to GDMT.    LVEDP normal without gradient on LV-AO pullback     Recommendation    Cont GDMT optimization for CAD  DAPT for at least 12 months  Aggressive risk factor reduction   on diet/lifestyle modification  Cardiac rehab upon discharge       Echo 7/5/23 Interpretation Summary         Left Ventricle: Left ventricular cavity size is normal. Wall thickness is normal. The left ventricular ejection fraction is 65%. Systolic function is normal. Diastolic function is normal.    The following segments are hypokinetic: apical septal, apical inferior and apex.    All other segments are normal.    Aortic Valve: The aortic valve is possibly bicuspid. The leaflets are not thickened. The leaflets are not calcified. The leaflets exhibit normal mobility. There is mild to moderate stenosis. The aortic valve mean gradient is 17 mmHg. The dimensionless velocity index is 0.41. The aortic valve area is 1.60 cm2.          EKGs, personally reviewed:  6/10/24 - NSR, 75 bpm, normal study     Relevant Labs & Results:  CMP 7/26/23 -- reviewed, K 4.2, Cr 1.02  CBC 7/26/23 -- WNL  Lipid profile 7/05/23 -- LDL 33 mg/dL  HbA1c 7/5/23 -- 9.5%    CMP 5/8/24 - Cr 0.89, K 4.4  BNP 5/8/24 - 22  HbA1c 5/8/24 - 11%  Lipid panel 5/8/24 - LDL 49 mg/dL   Troponin 6/10/24 negative x3  BMP 6/10/24 - K 3.8, Cr 0.96  CBC 6/10/24 - Hgb 14.9 g/dL    ROS:  Review of Systems:  Review of Systems    Objective:     Vitals:   Vitals:    06/13/24 1547   BP: 126/66   Pulse: 92   SpO2: 98%   Weight: 119 kg (263 lb 3.2 oz)   Height: 5' 4\" (1.626 m)      Body surface area is 2.2 meters squared.  Wt Readings from Last 3 " Encounters:   06/13/24 119 kg (263 lb 3.2 oz)   05/22/24 118 kg (260 lb 3.2 oz)   04/25/24 116 kg (256 lb)       Physical Exam:  General: Naina Jamison is a morbidly obese female, in no acute distress, sitting comfortably  HEENT: moist mucous membranes, EOMI  Neck:  Unable to assess JVD, supple, trachea midline  Cardiovascular: soft S2, RRR,   SM RUSB 3/6  Pulmonary: normal respiratory effort, CTAB  Abdomen: soft and nondistended  Extremities: +1 bilateral lower extremity edema. Warm and well perfused extremities.  Neuro: no focal motor deficits, AAOx3 (person, place, time)  Psych: Normal mood and affect, cooperative        Medications (at the START of this encounter):  Outpatient Medications Prior to Visit   Medication Sig Dispense Refill    ALPRAZolam (XANAX) 1 mg tablet Take 1 mg by mouth daily at bedtime as needed for anxiety Pt unable to verify dose for RN      aspirin (ECOTRIN LOW STRENGTH) 81 mg EC tablet Take 1 tablet (81 mg total) by mouth daily Do not start before July 7, 2023.  0    atorvastatin (LIPITOR) 40 mg tablet       cholecalciferol (VITAMIN D3) 1,000 units tablet Take 2,000 Units by mouth daily Pt unable to verify dose for RN      clopidogrel (Plavix) 75 mg tablet Take 1 tablet (75 mg total) by mouth daily 90 tablet 1    Continuous Blood Gluc Sensor (Guardian 4 Glucose Sensor) MISC Use 1 each every 7 days 15 each 0    Continuous Blood Gluc Transmit (Guardian 4 Transmitter) MISC To check blood sugars continuously 1 each 0    DULoxetine (CYMBALTA) 30 mg delayed release capsule Take 30 mg by mouth daily Pt unable to verify dose for RN      furosemide (LASIX) 40 mg tablet Take 1 tablet (40 mg total) by mouth daily 30 tablet 3    HYDROcodone-acetaminophen (NORCO) 5-325 mg per tablet Take 1 tablet by mouth every 6 (six) hours as needed for pain Pt unable to verify dose for RN      lisinopril-hydrochlorothiazide (PRINZIDE,ZESTORETIC) 10-12.5 MG per tablet Take 1 tablet by mouth daily 90 tablet 3     "nitroglycerin (NITROSTAT) 0.4 mg SL tablet Place 1 tablet (0.4 mg total) under the tongue every 5 (five) minutes as needed for chest pain 30 tablet 2    NovoLOG 100 UNIT/ML injection To be used in insulin pump approximately 120 units a day 110 mL 1    PATIENT MAINTAINED INSULIN PUMP Inject 1 each under the skin every 8 (eight) hours      capsaicin (ZOSTRIX) 0.025 % cream Apply 1 Application topically 1 (one) time for 1 dose 120 g 2    hydrOXYzine HCL (ATARAX) 25 mg tablet Take 25 mg by mouth every 6 (six) hours as needed for itching Pt unable to verify dose for RN (Patient not taking: Reported on 4/11/2024)       No facility-administered medications prior to visit.                 Time Spent:  Total time (face-to-face and non-face-to-face) spent on today's visit was 55 minutes. This includes preparation for the visits (i.e. reviewing test results), performance of a medically appropriate history and examination, and orders for medications, tests or other procedures. This time is exclusive of procedures performed and time spent teaching.      This note was completed in part utilizing Dragon [employer] voice recognition software. Grammatical errors, random word insertion, spelling mistakes, occasional wrong word or \"sound-alike\" substitutions and incomplete sentences may be an occasional consequence of the system secondary to software limitations, ambient noise and hardware issues. At the time of dictation, efforts were made to edit, clarify and /or correct errors.  Please read the chart carefully and recognize, using context, where substitutions have occurred.  If you have any questions or concerns about the context, text or information contained within the body of this dictation, please contact myself, the provider, for further clarification.    "

## 2024-06-13 NOTE — PATIENT INSTRUCTIONS
We have given you a dose of furosemide 40 mg through an IV. This should help get your diuresis going.  Please continue furosemide 40 mg daily at home with the addition of spironolactone 25 mg daily  If you find that you are still not responding to furosemide 40 mg, call us to let us know and increase to 80 mg   Lab work in 1-2 weeks

## 2024-06-13 NOTE — PROGRESS NOTES
Pt received 40 mg iv lasix in Left wrist without difficulty. IV d/c'd, cath intact.    UO-775 ml clear yellow urine     Post bp-124/78    Discussed low sodium diet and fluid restriction. Pt following diet, needs to cut back more on fluids per Dr. Dominguez.  Pt aware I will call her tomorrow for update.

## 2024-06-14 ENCOUNTER — HOSPITAL ENCOUNTER (EMERGENCY)
Facility: HOSPITAL | Age: 49
Discharge: STILL ADMITTED OR ADMINISTRATIVE | End: 2024-06-14
Payer: COMMERCIAL

## 2024-06-14 ENCOUNTER — APPOINTMENT (EMERGENCY)
Dept: RADIOLOGY | Facility: HOSPITAL | Age: 49
DRG: 286 | End: 2024-06-14
Payer: COMMERCIAL

## 2024-06-14 ENCOUNTER — HOSPITAL ENCOUNTER (INPATIENT)
Facility: HOSPITAL | Age: 49
LOS: 4 days | Discharge: HOME | DRG: 286 | End: 2024-06-18
Attending: EMERGENCY MEDICINE | Admitting: FAMILY MEDICINE
Payer: COMMERCIAL

## 2024-06-14 ENCOUNTER — NURSE TRIAGE (OUTPATIENT)
Age: 49
End: 2024-06-14

## 2024-06-14 DIAGNOSIS — R06.02 SHORTNESS OF BREATH: Primary | ICD-10-CM

## 2024-06-14 DIAGNOSIS — R07.9 CHEST PAIN, UNSPECIFIED TYPE: ICD-10-CM

## 2024-06-14 DIAGNOSIS — I50.33 ACUTE ON CHRONIC DIASTOLIC (CONGESTIVE) HEART FAILURE (HCC): ICD-10-CM

## 2024-06-14 DIAGNOSIS — I20.0 UNSTABLE ANGINA (HCC): ICD-10-CM

## 2024-06-14 DIAGNOSIS — R07.89 CHEST TIGHTNESS: ICD-10-CM

## 2024-06-14 PROBLEM — R65.10 SIRS (SYSTEMIC INFLAMMATORY RESPONSE SYNDROME) (HCC): Status: ACTIVE | Noted: 2024-06-14

## 2024-06-14 LAB
2HR DELTA HS TROPONIN: 1 NG/L
4HR DELTA HS TROPONIN: 1 NG/L
ALBUMIN SERPL BCP-MCNC: 3.7 G/DL (ref 3.5–5)
ALP SERPL-CCNC: 81 U/L (ref 34–104)
ALT SERPL W P-5'-P-CCNC: 35 U/L (ref 7–52)
ANION GAP SERPL CALCULATED.3IONS-SCNC: 6 MMOL/L (ref 4–13)
AST SERPL W P-5'-P-CCNC: 21 U/L (ref 13–39)
ATRIAL RATE: 95 BPM
B-OH-BUTYR SERPL-MCNC: <0.05 MMOL/L (ref 0.02–0.27)
BACTERIA UR QL AUTO: NORMAL /HPF
BASE EX.OXY STD BLDV CALC-SCNC: 43 % (ref 60–80)
BASE EXCESS BLDV CALC-SCNC: 2.2 MMOL/L
BASOPHILS # BLD AUTO: 0.06 THOUSANDS/ÂΜL (ref 0–0.1)
BASOPHILS NFR BLD AUTO: 1 % (ref 0–1)
BILIRUB SERPL-MCNC: 0.36 MG/DL (ref 0.2–1)
BILIRUB UR QL STRIP: NEGATIVE
BNP SERPL-MCNC: 19 PG/ML (ref 0–100)
BUN SERPL-MCNC: 15 MG/DL (ref 5–25)
CALCIUM SERPL-MCNC: 8.7 MG/DL (ref 8.4–10.2)
CARDIAC TROPONIN I PNL SERPL HS: 4 NG/L
CARDIAC TROPONIN I PNL SERPL HS: 5 NG/L
CARDIAC TROPONIN I PNL SERPL HS: 5 NG/L
CHLORIDE SERPL-SCNC: 101 MMOL/L (ref 96–108)
CLARITY UR: CLEAR
CO2 SERPL-SCNC: 28 MMOL/L (ref 21–32)
COLOR UR: ABNORMAL
CREAT SERPL-MCNC: 0.83 MG/DL (ref 0.6–1.3)
EOSINOPHIL # BLD AUTO: 0.35 THOUSAND/ÂΜL (ref 0–0.61)
EOSINOPHIL NFR BLD AUTO: 3 % (ref 0–6)
ERYTHROCYTE [DISTWIDTH] IN BLOOD BY AUTOMATED COUNT: 11.9 % (ref 11.6–15.1)
GFR SERPL CREATININE-BSD FRML MDRD: 84 ML/MIN/1.73SQ M
GLUCOSE SERPL-MCNC: 147 MG/DL (ref 65–140)
GLUCOSE SERPL-MCNC: 151 MG/DL (ref 65–140)
GLUCOSE SERPL-MCNC: 265 MG/DL (ref 65–140)
GLUCOSE SERPL-MCNC: 322 MG/DL (ref 65–140)
GLUCOSE UR STRIP-MCNC: ABNORMAL MG/DL
HCO3 BLDV-SCNC: 27.1 MMOL/L (ref 24–30)
HCT VFR BLD AUTO: 40 % (ref 34.8–46.1)
HGB BLD-MCNC: 13.6 G/DL (ref 11.5–15.4)
HGB UR QL STRIP.AUTO: NEGATIVE
IMM GRANULOCYTES # BLD AUTO: 0.07 THOUSAND/UL (ref 0–0.2)
IMM GRANULOCYTES NFR BLD AUTO: 1 % (ref 0–2)
KETONES UR STRIP-MCNC: ABNORMAL MG/DL
LEUKOCYTE ESTERASE UR QL STRIP: ABNORMAL
LYMPHOCYTES # BLD AUTO: 2.14 THOUSANDS/ÂΜL (ref 0.6–4.47)
LYMPHOCYTES NFR BLD AUTO: 17 % (ref 14–44)
MAGNESIUM SERPL-MCNC: 1.6 MG/DL (ref 1.9–2.7)
MCH RBC QN AUTO: 29.4 PG (ref 26.8–34.3)
MCHC RBC AUTO-ENTMCNC: 34 G/DL (ref 31.4–37.4)
MCV RBC AUTO: 86 FL (ref 82–98)
MONOCYTES # BLD AUTO: 0.78 THOUSAND/ÂΜL (ref 0.17–1.22)
MONOCYTES NFR BLD AUTO: 6 % (ref 4–12)
NEUTROPHILS # BLD AUTO: 9.24 THOUSANDS/ÂΜL (ref 1.85–7.62)
NEUTS SEG NFR BLD AUTO: 72 % (ref 43–75)
NITRITE UR QL STRIP: NEGATIVE
NON-SQ EPI CELLS URNS QL MICRO: NORMAL /HPF
NRBC BLD AUTO-RTO: 0 /100 WBCS
O2 CT BLDV-SCNC: 8.6 ML/DL
P AXIS: 8 DEGREES
PCO2 BLDV: 43.3 MM HG (ref 42–50)
PH BLDV: 7.42 [PH] (ref 7.3–7.4)
PH UR STRIP.AUTO: 6 [PH]
PHOSPHATE SERPL-MCNC: 2.2 MG/DL (ref 2.7–4.5)
PLATELET # BLD AUTO: 245 THOUSANDS/UL (ref 149–390)
PMV BLD AUTO: 10.1 FL (ref 8.9–12.7)
PO2 BLDV: 24.1 MM HG (ref 35–45)
POTASSIUM SERPL-SCNC: 4.3 MMOL/L (ref 3.5–5.3)
PR INTERVAL: 128 MS
PROT SERPL-MCNC: 5.9 G/DL (ref 6.4–8.4)
PROT UR STRIP-MCNC: NEGATIVE MG/DL
QRS AXIS: 14 DEGREES
QRSD INTERVAL: 68 MS
QT INTERVAL: 336 MS
QTC INTERVAL: 422 MS
RBC # BLD AUTO: 4.63 MILLION/UL (ref 3.81–5.12)
RBC #/AREA URNS AUTO: NORMAL /HPF
SODIUM SERPL-SCNC: 135 MMOL/L (ref 135–147)
SP GR UR STRIP.AUTO: 1.02 (ref 1–1.03)
T WAVE AXIS: 45 DEGREES
TSH SERPL DL<=0.05 MIU/L-ACNC: 1.6 UIU/ML (ref 0.45–4.5)
UROBILINOGEN UR STRIP-ACNC: <2 MG/DL
VENTRICULAR RATE: 95 BPM
WBC # BLD AUTO: 12.64 THOUSAND/UL (ref 4.31–10.16)
WBC #/AREA URNS AUTO: NORMAL /HPF

## 2024-06-14 PROCEDURE — 99285 EMERGENCY DEPT VISIT HI MDM: CPT

## 2024-06-14 PROCEDURE — 36415 COLL VENOUS BLD VENIPUNCTURE: CPT

## 2024-06-14 PROCEDURE — 82948 REAGENT STRIP/BLOOD GLUCOSE: CPT

## 2024-06-14 PROCEDURE — 85025 COMPLETE CBC W/AUTO DIFF WBC: CPT

## 2024-06-14 PROCEDURE — 99285 EMERGENCY DEPT VISIT HI MDM: CPT | Performed by: EMERGENCY MEDICINE

## 2024-06-14 PROCEDURE — 96375 TX/PRO/DX INJ NEW DRUG ADDON: CPT

## 2024-06-14 PROCEDURE — 99222 1ST HOSP IP/OBS MODERATE 55: CPT | Performed by: INTERNAL MEDICINE

## 2024-06-14 PROCEDURE — 93005 ELECTROCARDIOGRAM TRACING: CPT

## 2024-06-14 PROCEDURE — 83880 ASSAY OF NATRIURETIC PEPTIDE: CPT

## 2024-06-14 PROCEDURE — 84484 ASSAY OF TROPONIN QUANT: CPT

## 2024-06-14 PROCEDURE — 82010 KETONE BODYS QUAN: CPT

## 2024-06-14 PROCEDURE — 84100 ASSAY OF PHOSPHORUS: CPT

## 2024-06-14 PROCEDURE — 84443 ASSAY THYROID STIM HORMONE: CPT

## 2024-06-14 PROCEDURE — 96365 THER/PROPH/DIAG IV INF INIT: CPT

## 2024-06-14 PROCEDURE — 80053 COMPREHEN METABOLIC PANEL: CPT

## 2024-06-14 PROCEDURE — 96366 THER/PROPH/DIAG IV INF ADDON: CPT

## 2024-06-14 PROCEDURE — 81001 URINALYSIS AUTO W/SCOPE: CPT

## 2024-06-14 PROCEDURE — 93010 ELECTROCARDIOGRAM REPORT: CPT | Performed by: INTERNAL MEDICINE

## 2024-06-14 PROCEDURE — 71045 X-RAY EXAM CHEST 1 VIEW: CPT

## 2024-06-14 PROCEDURE — 83735 ASSAY OF MAGNESIUM: CPT

## 2024-06-14 PROCEDURE — 82805 BLOOD GASES W/O2 SATURATION: CPT

## 2024-06-14 RX ORDER — MAGNESIUM SULFATE HEPTAHYDRATE 40 MG/ML
2 INJECTION, SOLUTION INTRAVENOUS ONCE
Status: COMPLETED | OUTPATIENT
Start: 2024-06-14 | End: 2024-06-14

## 2024-06-14 RX ORDER — METOCLOPRAMIDE HYDROCHLORIDE 5 MG/ML
10 INJECTION INTRAMUSCULAR; INTRAVENOUS EVERY 8 HOURS SCHEDULED
Status: DISCONTINUED | OUTPATIENT
Start: 2024-06-14 | End: 2024-06-16

## 2024-06-14 RX ORDER — HYDROMORPHONE HCL/PF 1 MG/ML
0.5 SYRINGE (ML) INJECTION ONCE
Status: COMPLETED | OUTPATIENT
Start: 2024-06-14 | End: 2024-06-14

## 2024-06-14 RX ORDER — BUTALBITAL, ACETAMINOPHEN AND CAFFEINE 50; 325; 40 MG/1; MG/1; MG/1
1 TABLET ORAL ONCE
Status: COMPLETED | OUTPATIENT
Start: 2024-06-14 | End: 2024-06-14

## 2024-06-14 RX ORDER — ENOXAPARIN SODIUM 100 MG/ML
40 INJECTION SUBCUTANEOUS DAILY
Status: DISCONTINUED | OUTPATIENT
Start: 2024-06-14 | End: 2024-06-14

## 2024-06-14 RX ORDER — FUROSEMIDE 10 MG/ML
40 INJECTION INTRAMUSCULAR; INTRAVENOUS
Status: DISCONTINUED | OUTPATIENT
Start: 2024-06-15 | End: 2024-06-15

## 2024-06-14 RX ORDER — KETOROLAC TROMETHAMINE 30 MG/ML
15 INJECTION, SOLUTION INTRAMUSCULAR; INTRAVENOUS ONCE
Status: COMPLETED | OUTPATIENT
Start: 2024-06-14 | End: 2024-06-14

## 2024-06-14 RX ORDER — FUROSEMIDE 10 MG/ML
60 INJECTION INTRAMUSCULAR; INTRAVENOUS ONCE
Status: COMPLETED | OUTPATIENT
Start: 2024-06-14 | End: 2024-06-14

## 2024-06-14 RX ORDER — MAGNESIUM SULFATE HEPTAHYDRATE 40 MG/ML
2 INJECTION, SOLUTION INTRAVENOUS EVERY 24 HOURS
Status: DISCONTINUED | OUTPATIENT
Start: 2024-06-14 | End: 2024-06-16

## 2024-06-14 RX ORDER — HEPARIN SODIUM 5000 [USP'U]/ML
7500 INJECTION, SOLUTION INTRAVENOUS; SUBCUTANEOUS EVERY 8 HOURS SCHEDULED
Status: DISCONTINUED | OUTPATIENT
Start: 2024-06-14 | End: 2024-06-18 | Stop reason: HOSPADM

## 2024-06-14 RX ADMIN — MAGNESIUM SULFATE HEPTAHYDRATE 2 G: 40 INJECTION, SOLUTION INTRAVENOUS at 18:48

## 2024-06-14 RX ADMIN — FUROSEMIDE 60 MG: 10 INJECTION, SOLUTION INTRAMUSCULAR; INTRAVENOUS at 16:10

## 2024-06-14 RX ADMIN — HEPARIN SODIUM 7500 UNITS: 5000 INJECTION INTRAVENOUS; SUBCUTANEOUS at 21:47

## 2024-06-14 RX ADMIN — KETOROLAC TROMETHAMINE 15 MG: 30 INJECTION, SOLUTION INTRAMUSCULAR; INTRAVENOUS at 18:45

## 2024-06-14 RX ADMIN — METOCLOPRAMIDE 10 MG: 5 INJECTION, SOLUTION INTRAMUSCULAR; INTRAVENOUS at 21:47

## 2024-06-14 RX ADMIN — MAGNESIUM SULFATE HEPTAHYDRATE 2 G: 40 INJECTION, SOLUTION INTRAVENOUS at 12:41

## 2024-06-14 RX ADMIN — BUTALBITAL, ACETAMINOPHEN AND CAFFEINE 1 TABLET: 50; 325; 40 TABLET ORAL at 16:11

## 2024-06-14 RX ADMIN — HYDROMORPHONE HYDROCHLORIDE 0.5 MG: 1 INJECTION, SOLUTION INTRAMUSCULAR; INTRAVENOUS; SUBCUTANEOUS at 13:20

## 2024-06-14 NOTE — TELEPHONE ENCOUNTER
Received call from pt, who calls stating she thinks she needs to go to hospital.  Pt states she feels worse than she did yesterday.  She continues to have edema to arms, legs, abdomen, and eyelids.  She reports having chest pain, headache, and dizziness.  Pt states she's short of breath and heard herself wheezing.    Advised pt per protocol to go to ED for evaluation, pt agreeable.  States she will have her friend bring her.  Pt states she will go to [facility].  Pt asked that Dr. Newell be informed, and asked if there was a way he could admit her to the hospital.    Please advise.

## 2024-06-14 NOTE — ASSESSMENT & PLAN NOTE
Status post recent PCI/KSENIA to LAD in July/2023  On DAPT with Plavix and aspirin, till July 1, 2024.  And thereafter to be on aspirin 81 mg daily  Serial troponin negative

## 2024-06-14 NOTE — TELEPHONE ENCOUNTER
"Reason for Disposition   MODERATE difficulty breathing (e.g., speaks in phrases, SOB even at rest, pulse 100-120) of new-onset or worse than normal    Answer Assessment - Initial Assessment Questions  1. RESPIRATORY STATUS: \"Describe your breathing?\" (e.g., wheezing, shortness of breath, unable to speak, severe coughing)       Short of breath  2. ONSET: \"When did this breathing problem begin?\"       Ongoing   3. PATTERN \"Does the difficult breathing come and go, or has it been constant since it started?\"       Constant   4. SEVERITY: \"How bad is your breathing?\" (e.g., mild, moderate, severe)     - MILD: No SOB at rest, mild SOB with walking, speaks normally in sentences, can lay down, no retractions, pulse < 100.     - MODERATE: SOB at rest, SOB with minimal exertion and prefers to sit, cannot lie down flat, speaks in phrases, mild retractions, audible wheezing, pulse 100-120.     - SEVERE: Very SOB at rest, speaks in single words, struggling to breathe, sitting hunched forward, retractions, pulse > 120       Moderate, wheezing noted.  5. RECURRENT SYMPTOM: \"Have you had difficulty breathing before?\" If Yes, ask: \"When was the last time?\" and \"What happened that time?\"       Present now   6. CARDIAC HISTORY: \"Do you have any history of heart disease?\" (e.g., heart attack, angina, bypass surgery, angioplasty)       CHF, heart attack last July, stent placed  7. LUNG HISTORY: \"Do you have any history of lung disease?\"  (e.g., pulmonary embolus, asthma, emphysema)      Denies   8. CAUSE: \"What do you think is causing the breathing problem?\"       CHF   9. OTHER SYMPTOMS: \"Do you have any other symptoms? (e.g., dizziness, runny nose, cough, chest pain, fever)      Swelling- eyelids, abdomen, legs/arms. Headache. Chest pain. Dizziness   10. PREGNANCY: \"Is there any chance you are pregnant?\" \"When was your last menstrual period?\"        Denies, hysterectomy   11. TRAVEL: \"Have you traveled out of the country in the last " "month?\" (e.g., travel history, exposures)        Denies    Protocols used: Breathing Difficulty-ADULT-[place]    "

## 2024-06-14 NOTE — ASSESSMENT & PLAN NOTE
Meeting SIRS criteria with tachycardia, leukocytosis likely reactive due to CHF exacerbation.   No focal source of infection  Is afebrile  Status post UA, no WBC.  Chest x-ray without infiltrate

## 2024-06-14 NOTE — H&P
[facility]  H&P  Name: Naina Jamison 48 y.o. female I MRN: [id number]  Unit/Bed#: QCA I Date of Admission: 6/14/2024   Date of Service: 6/14/2024 I Hospital Day: 0      Assessment & Plan   * Acute on chronic diastolic (congestive) heart failure (HCC)  Assessment & Plan  Wt Readings from Last 3 Encounters:   06/13/24 119 kg (263 lb 3.2 oz)   05/22/24 118 kg (260 lb 3.2 oz)   04/25/24 116 kg (256 lb)   Recent echo in 4//24, preserved EF of 60%  Will consult cardiology  IV Lasix 60 mg x 1 now.  She did take her usual dose of 40 mg daily prior to presentation to the ER.  Continue on IV Lasix 40 mg twice daily  Was recently started on Aldactone which we will continue  Monitor input/output, daily weights, fluid restriction  Serial troponin negative      Chest pain  Assessment & Plan  Reports more like chest tightness.  Likely secondary to CHF exacerbation  History of CAD status post PCI/KSENIA  Serial troponin negative x 2  No acute ischemic changes    SIRS (systemic inflammatory response syndrome) ([facility])  Assessment & Plan  Meeting SIRS criteria with tachycardia, leukocytosis likely reactive due to CHF exacerbation.   No focal source of infection  Is afebrile  Status post UA, no WBC.  Chest x-ray without infiltrate    Type 1 diabetes mellitus with other circulatory complication (HCC)  Assessment & Plan  Lab Results   Component Value Date    HGBA1C 11.0 (H) 05/08/2024       Recent Labs     06/14/24  1054   POCGLU 322*       Blood Sugar Average: Last 72 hrs:  (P) 322  Insulin pump in place.  Patient able and competent to use independently  Fingerstick checks 4 times daily    Hyperthyroidism  Assessment & Plan  In euthyroid state  Has been off methimazole since July 2022    Coronary artery disease involving native coronary artery of native heart without angina pectoris  Assessment & Plan  Status post recent PCI/KSENIA to LAD in July/2023  On DAPT with Plavix and aspirin, till July 1,  "2024.  And thereafter to be on aspirin 81 mg daily  Serial troponin negative    Morbid obesity  Assessment & Plan  Educated on lifestyle/dietary modification    Essential hypertension  Assessment & Plan  Stable       VTE Prophylaxis: Heparin  / sequential compression device   Code Status: Full code  POLST: There is no POLST form on file for this patient (pre-hospital)  Discussion with family: Plan of care discussed with patient and next of kin at bedside    Anticipated Length of Stay:  Patient will be admitted on an Inpatient basis with an anticipated length of stay of more than 2 midnights.   Justification for Hospital Stay: CHF exacerbation    Total Time for Visit, including Counseling / Coordination of Care: 60 minutes.  Greater than 50% of this total time spent on direct patient counseling and coordination of care.    Chief Complaint:   \"I have been having chest tightness and I feel swollen \"    History of Present Illness:    Naina Jamison is a 48 y.o. female medical history significant for chronic diastolic heart failure, CAD status post KSENIA to LAD, hypertension, type 1 diabetes, presents to the ER with complaint of chest tightness and diffuse edema.  She reports of chest tightness has been ongoing for the past week.  Located in the substernal region radiating up her jaw.  She also reports of intermittent shortness of breath.  She reports of also diffuse edema in her lower, upper extremities and increasing abdominal girth ongoing for the past few weeks.  She had actually seen her cardiologist as outpatient yesterday, she was given a dose of IV Lasix 40 mg x 1 and the plan was to consider increasing her Lasix dose to 80 mg daily.  She was also started on Aldactone 25 mg daily but states that she has not had a chance to fill the prescription yet.  She reports that she has been compliant with diuretic regimen and maintaining a low-salt diet.  She has been weighing herself daily and has noted an increased " amount of weight gain.  She had also presented to the ER on 6/10 with same symptoms.    Review of Systems:    Review of Systems   Respiratory:  Positive for chest tightness and shortness of breath.    Cardiovascular:  Positive for leg swelling.   Gastrointestinal:         Increasing abdominal girth       Past Medical and Surgical History:     Past Medical History:   Diagnosis Date    Diabetes mellitus (HCC)     Graves disease     High cholesterol     Hypertension        Past Surgical History:   Procedure Laterality Date    ABDOMINAL SURGERY      Gallbladder removal    CARDIAC CATHETERIZATION Left 7/5/2023    Procedure: Cardiac Left Heart Cath;  Surgeon: Elba Nevarez DO;  Location: [facility];  Service: Cardiology    CARDIAC CATHETERIZATION N/A 7/5/2023    Procedure: Cardiac Coronary Angiogram;  Surgeon: Elba Nevarez DO;  Location: [facility];  Service: Cardiology    HYSTERECTOMY      US GUIDED THYROID BIOPSY  10/4/2018       Meds/Allergies:    Prior to Admission medications    Medication Sig Start Date End Date Taking? Authorizing Provider   ALPRAZolam (XANAX) 1 mg tablet Take 1 mg by mouth daily at bedtime as needed for anxiety Pt unable to verify dose for RN    Historical Provider, MD   aspirin (ECOTRIN LOW STRENGTH) 81 mg EC tablet Take 1 tablet (81 mg total) by mouth daily Do not start before July 7, 2023. 7/7/23   Gavi Jha PA-C   atorvastatin (LIPITOR) 40 mg tablet  10/24/23   Historical Provider, MD   capsaicin (ZOSTRIX) 0.025 % cream Apply 1 Application topically 1 (one) time for 1 dose 11/10/23 11/10/23  Jacqueline Evans DPM   cholecalciferol (VITAMIN D3) 1,000 units tablet Take 2,000 Units by mouth daily Pt unable to verify dose for RN    Historical Provider, MD   clopidogrel (Plavix) 75 mg tablet Take 1 tablet (75 mg total) by mouth daily 3/6/24   Alonso Newell MD   Continuous Blood Gluc Sensor (Guardian 4 Glucose Sensor) MISC Use 1 each every 7 days 4/7/24 7/6/24  Adrienne  MD Ale   Continuous Blood Gluc Transmit (Guardian 4 Transmitter) MISC To check blood sugars continuously 4/7/24   Adrienne Aguilera MD   DULoxetine (CYMBALTA) 30 mg delayed release capsule Take 30 mg by mouth daily Pt unable to verify dose for RN    Historical Provider, MD   furosemide (LASIX) 80 mg tablet Take 1 tablet (80 mg total) by mouth daily 6/13/24   Alonso Newell MD   HYDROcodone-acetaminophen (NORCO) 5-325 mg per tablet Take 1 tablet by mouth every 6 (six) hours as needed for pain Pt unable to verify dose for RN    Historical Provider, MD   hydrOXYzine HCL (ATARAX) 25 mg tablet Take 25 mg by mouth every 6 (six) hours as needed for itching Pt unable to verify dose for RN  Patient not taking: Reported on 4/11/2024    Historical Provider, MD   lisinopril (ZESTRIL) 10 mg tablet Take 1 tablet (10 mg total) by mouth daily 6/13/24   Alonso Newell MD   nitroglycerin (NITROSTAT) 0.4 mg SL tablet Place 1 tablet (0.4 mg total) under the tongue every 5 (five) minutes as needed for chest pain 7/12/23   PENG Hudson   NovoLOG 100 UNIT/ML injection To be used in insulin pump approximately 120 units a day 5/13/24   Adrienne Aguilera MD   PATIENT MAINTAINED INSULIN PUMP Inject 1 each under the skin every 8 (eight) hours    Historical Provider, MD   spironolactone (ALDACTONE) 25 mg tablet Take 1 tablet (25 mg total) by mouth daily 6/13/24   Alonso Newell MD     I have reviewed home medications using allscripts.    Allergies:   Allergies   Allergen Reactions    Milnacipran GI Intolerance    Percocet [Oxycodone-Acetaminophen] Vomiting    Pregabalin Other (See Comments)     Didn't help    Victoza [Liraglutide] GI Intolerance       Social History:     Marital Status: /Civil Union   Occupation:   Patient Pre-hospital Living Situation: Resides at home  Patient Pre-hospital Level of Mobility: Independent  Patient Pre-hospital Diet Restrictions: None  Substance Use History:   Social History      Substance and Sexual Activity   Alcohol Use Never     Social History     Tobacco Use   Smoking Status Never   Smokeless Tobacco Never     Social History     Substance and Sexual Activity   Drug Use Never       Family History:    History reviewed. No pertinent family history.    Physical Exam:     Vitals:   Blood Pressure: 166/74 (06/14/24 1600)  Pulse: 84 (06/14/24 1600)  Temperature: 98.2 °F (36.8 °C) (06/14/24 1052)  Respirations: 19 (06/14/24 1400)  SpO2: 99 % (06/14/24 1600)    Physical Exam  Constitutional:       Appearance: She is obese.   Cardiovascular:      Rate and Rhythm: Normal rate and regular rhythm.      Pulses: Normal pulses.      Heart sounds: Normal heart sounds.   Pulmonary:      Effort: Pulmonary effort is normal. No respiratory distress.      Breath sounds: Normal breath sounds. No wheezing or rales.   Abdominal:      General: Bowel sounds are normal. There is no distension.      Palpations: Abdomen is soft.      Tenderness: There is no abdominal tenderness. There is no guarding.   Musculoskeletal:      Cervical back: Normal range of motion and neck supple.      Right lower leg: Edema present.      Left lower leg: Edema present.   Skin:     General: Skin is warm and dry.   Neurological:      General: No focal deficit present.      Mental Status: She is alert and oriented to person, place, and time. Mental status is at baseline.      Cranial Nerves: No cranial nerve deficit.      Motor: No weakness.         Additional Data:     Lab Results: I have personally reviewed pertinent reports.      Results from last 7 days   Lab Units 06/14/24  1148   WBC Thousand/uL 12.64*   HEMOGLOBIN g/dL 13.6   HEMATOCRIT % 40.0   PLATELETS Thousands/uL 245   SEGS PCT % 72   LYMPHO PCT % 17   MONO PCT % 6   EOS PCT % 3     Results from last 7 days   Lab Units 06/14/24  1148   SODIUM mmol/L 135   POTASSIUM mmol/L 4.3   CHLORIDE mmol/L 101   CO2 mmol/L 28   BUN mg/dL 15   CREATININE mg/dL 0.83   ANION GAP mmol/L 6    CALCIUM mg/dL 8.7   ALBUMIN g/dL 3.7   TOTAL BILIRUBIN mg/dL 0.36   ALK PHOS U/L 81   ALT U/L 35   AST U/L 21   GLUCOSE RANDOM mg/dL 265*         Results from last 7 days   Lab Units 06/14/24  1054   POC GLUCOSE mg/dl 322*               Imaging: I have personally reviewed pertinent reports.      XR chest 1 view portable   Final Result by Braden Melo MD (06/14 0327)      No acute cardiopulmonary disease.            Workstation performed: [id number]             EKG, Pathology, and Other Studies Reviewed on Admission:   EKG: Normal sinus rhythm at 95 bpm, normal axis, Q-wave in lead III    Allscripts / Epic Records Reviewed: Yes     ** Please Note: This note has been constructed using a voice recognition system. **

## 2024-06-14 NOTE — PLAN OF CARE
Problem: PAIN - ADULT  Goal: Verbalizes/displays adequate comfort level or baseline comfort level  Description: Interventions:  - Encourage patient to monitor pain and request assistance  - Assess pain using appropriate pain scale  - Administer analgesics based on type and severity of pain and evaluate response  - Implement non-pharmacological measures as appropriate and evaluate response  - Consider cultural and social influences on pain and pain management  - Notify physician/advanced practitioner if interventions unsuccessful or patient reports new pain  Outcome: Progressing     Problem: INFECTION - ADULT  Goal: Absence or prevention of progression during hospitalization  Description: INTERVENTIONS:  - Assess and monitor for signs and symptoms of infection  - Monitor lab/diagnostic results  - Monitor all insertion sites, i.e. indwelling lines, tubes, and drains  - Monitor endotracheal if appropriate and nasal secretions for changes in amount and color  - [place] appropriate cooling/warming therapies per order  - Administer medications as ordered  - Instruct and encourage patient and family to use good hand hygiene technique  - Identify and instruct in appropriate isolation precautions for identified infection/condition  Outcome: Progressing  Goal: Absence of fever/infection during neutropenic period  Description: INTERVENTIONS:  - Monitor WBC    Outcome: Progressing     Problem: SAFETY ADULT  Goal: Patient will remain free of falls  Description: INTERVENTIONS:  - Educate patient/family on patient safety including physical limitations  - Instruct patient to call for assistance with activity   - Consult OT/PT to assist with strengthening/mobility   - Keep Call bell within reach  - Keep bed low and locked with side rails adjusted as appropriate  - Keep care items and personal belongings within reach  - Initiate and maintain comfort rounds  - Make Fall Risk Sign visible to staff  - Offer Toileting every  Hours,  in advance of need  - Initiate/Maintain alarm  - Obtain necessary fall risk management equipment:   - Apply yellow socks and bracelet for high fall risk patients  - Consider moving patient to room near nurses station  Outcome: Progressing  Goal: Maintain or return to baseline ADL function  Description: INTERVENTIONS:  -  Assess patient's ability to carry out ADLs; assess patient's baseline for ADL function and identify physical deficits which impact ability to perform ADLs (bathing, care of mouth/teeth, toileting, grooming, dressing, etc.)  - Assess/evaluate cause of self-care deficits   - Assess range of motion  - Assess patient's mobility; develop plan if impaired  - Assess patient's need for assistive devices and provide as appropriate  - Encourage maximum independence but intervene and supervise when necessary  - Involve family in performance of ADLs  - Assess for home care needs following discharge   - Consider OT consult to assist with ADL evaluation and planning for discharge  - Provide patient education as appropriate  Outcome: Progressing  Goal: Maintains/Returns to pre admission functional level  Description: INTERVENTIONS:  - Perform AM-PAC 6 Click Basic Mobility/ Daily Activity assessment daily.  - Set and communicate daily mobility goal to care team and patient/family/caregiver.   - Collaborate with rehabilitation services on mobility goals if consulted  - Perform Range of Motion  times a day.  - Reposition patient every  hours.  - Dangle patient  times a day  - Stand patient  times a day  - Ambulate patient  times a day  - Out of bed to chair  times a day   - Out of bed for meals times a day  - Out of bed for toileting  - Record patient progress and toleration of activity level   Outcome: Progressing     Problem: DISCHARGE PLANNING  Goal: Discharge to home or other facility with appropriate resources  Description: INTERVENTIONS:  - Identify barriers to discharge w/patient and caregiver  - Arrange for  needed discharge resources and transportation as appropriate  - Identify discharge learning needs (meds, wound care, etc.)  - Arrange for interpretive services to assist at discharge as needed  - Refer to Case Management Department for coordinating discharge planning if the patient needs post-hospital services based on physician/advanced practitioner order or complex needs related to functional status, cognitive ability, or social support system  Outcome: Progressing     Problem: Knowledge Deficit  Goal: Patient/family/caregiver demonstrates understanding of disease process, treatment plan, medications, and discharge instructions  Description: Complete learning assessment and assess knowledge base.  Interventions:  - Provide teaching at level of understanding  - Provide teaching via preferred learning methods  Outcome: Progressing     Problem: CARDIOVASCULAR - ADULT  Goal: Maintains optimal cardiac output and hemodynamic stability  Description: INTERVENTIONS:  - Monitor I/O, vital signs and rhythm  - Monitor for S/S and trends of decreased cardiac output  - Administer and titrate ordered vasoactive medications to optimize hemodynamic stability  - Assess quality of pulses, skin color and temperature  - Assess for signs of decreased coronary artery perfusion  - Instruct patient to report change in severity of symptoms  Outcome: Progressing  Goal: Absence of cardiac dysrhythmias or at baseline rhythm  Description: INTERVENTIONS:  - Continuous cardiac monitoring, vital signs, obtain 12 lead EKG if ordered  - Administer antiarrhythmic and heart rate control medications as ordered  - Monitor electrolytes and administer replacement therapy as ordered  Outcome: Progressing     Problem: RESPIRATORY - ADULT  Goal: Achieves optimal ventilation and oxygenation  Description: INTERVENTIONS:  - Assess for changes in respiratory status  - Assess for changes in mentation and behavior  - Position to facilitate oxygenation and  minimize respiratory effort  - Oxygen administered by appropriate delivery if ordered  - Initiate smoking cessation education as indicated  - Encourage broncho-pulmonary hygiene including cough, deep breathe, Incentive Spirometry  - Assess the need for suctioning and aspirate as needed  - Assess and instruct to report SOB or any respiratory difficulty  - Respiratory Therapy support as indicated  Outcome: Progressing     Problem: METABOLIC, FLUID AND ELECTROLYTES - ADULT  Goal: Electrolytes maintained within normal limits  Description: INTERVENTIONS:  - Monitor labs and assess patient for signs and symptoms of electrolyte imbalances  - Administer electrolyte replacement as ordered  - Monitor response to electrolyte replacements, including repeat lab results as appropriate  - Instruct patient on fluid and nutrition as appropriate  Outcome: Progressing  Goal: Fluid balance maintained  Description: INTERVENTIONS:  - Monitor labs   - Monitor I/O and WT  - Instruct patient on fluid and nutrition as appropriate  - Assess for signs & symptoms of volume excess or deficit  Outcome: Progressing  Goal: Glucose maintained within target range  Description: INTERVENTIONS:  - Monitor Blood Glucose as ordered  - Assess for signs and symptoms of hyperglycemia and hypoglycemia  - Administer ordered medications to maintain glucose within target range  - Assess nutritional intake and initiate nutrition service referral as needed  Outcome: Progressing

## 2024-06-14 NOTE — ASSESSMENT & PLAN NOTE
Reports more like chest tightness.  Likely secondary to CHF exacerbation  History of CAD status post PCI/KSENIA  Serial troponin negative x 2  No acute ischemic changes

## 2024-06-14 NOTE — ASSESSMENT & PLAN NOTE
Lab Results   Component Value Date    HGBA1C 11.0 (H) 05/08/2024       Recent Labs     06/14/24  1054   POCGLU 322*       Blood Sugar Average: Last 72 hrs:  (P) 322  Insulin pump in place.  Patient able and competent to use independently  Fingerstick checks 4 times daily

## 2024-06-14 NOTE — ED ATTENDING ATTESTATION
6/14/2024   IAsha MD, saw and evaluated the patient. I have discussed the patient with the resident/non-physician practitioner and agree with the resident's/non-physician practitioner's findings, Plan of Care, and MDM as documented in the resident's/non-physician practitioner's note, except where noted. All available labs and Radiology studies were reviewed.  I was present for key portions of any procedure(s) performed by the resident/non-physician practitioner and I was immediately available to provide assistance.       At this point I agree with the current assessment done in the Emergency Department.  I have conducted an independent evaluation of this patient a history and physical is as follows:    Unit/Bed#: -01 Encounter: [id number]    Chief Complaint   Patient presents with    Chest Pain     Reports CP and SOB since Monday. Was seen in ED Monday for same symptoms. Is now c/o nausea and dizziness. Also reports sugar of 370     48-year-old female presenting with upper chest as well as jaw pain as well as shortness of breath that is worsened with any activity as well as with laying flat.  Symptoms are associated with nausea.  Patient has a history of coronary artery disease and prior stents.  Symptoms are similar to symptoms preceding her MI last year.    Physical Exam  ED Triage Vitals   Temperature Pulse Respirations Blood Pressure SpO2   06/14/24 1052 06/14/24 1052 06/14/24 1052 06/14/24 1052 06/14/24 1052   98.2 °F (36.8 °C) 90 19 154/78 98 %      Temp Source Heart Rate Source Patient Position - Orthostatic VS BP Location FiO2 (%)   06/14/24 1905 06/14/24 1400 06/14/24 1052 06/14/24 1052 --   Oral Monitor Lying Right arm       Pain Score       06/14/24 1052       10 - Worst Possible Pain           Vital signs and nursing notes reviewed    CONSTITUTIONAL: female appearing stated age resting in bed, in no acute distress  HEENT: atraumatic, normocephalic. Sclera anicteric, conjunctiva are not  injected. Moist oral mucosa  CARDIOVASCULAR/CHEST: RRR, no M/R/G. 2+ radial pulses  PULMONARY: Breathing comfortably on RA. Breath sounds are equal and clear to auscultation  ABDOMEN: non-distended. BS present, normoactive. Non-tender  MSK: moves all extremities, no deformities, 1+ pitting peripheral edema, no calf asymmetry  NEURO: Awake, alert, and oriented x 3. Face symmetric. Moves all extremities spontaneously. No focal neurologic deficits  SKIN: Warm, appears well-perfused  MENTAL STATUS: Normal affect      Labs and Imaging  Labs Reviewed   CBC AND DIFFERENTIAL - Abnormal       Result Value Ref Range Status    WBC 12.64 (*) 4.31 - 10.16 Thousand/uL Final    RBC 4.63  3.81 - 5.12 Million/uL Final    Hemoglobin 13.6  11.5 - 15.4 g/dL Final    Hematocrit 40.0  34.8 - 46.1 % Final    MCV 86  82 - 98 fL Final    MCH 29.4  26.8 - 34.3 pg Final    MCHC 34.0  31.4 - 37.4 g/dL Final    RDW 11.9  11.6 - 15.1 % Final    MPV 10.1  8.9 - 12.7 fL Final    Platelets 245  149 - 390 Thousands/uL Final    nRBC 0  /100 WBCs Final    Segmented % 72  43 - 75 % Final    Immature Grans % 1  0 - 2 % Final    Lymphocytes % 17  14 - 44 % Final    Monocytes % 6  4 - 12 % Final    Eosinophils Relative 3  0 - 6 % Final    Basophils Relative 1  0 - 1 % Final    Absolute Neutrophils 9.24 (*) 1.85 - 7.62 Thousands/µL Final    Absolute Immature Grans 0.07  0.00 - 0.20 Thousand/uL Final    Absolute Lymphocytes 2.14  0.60 - 4.47 Thousands/µL Final    Absolute Monocytes 0.78  0.17 - 1.22 Thousand/µL Final    Eosinophils Absolute 0.35  0.00 - 0.61 Thousand/µL Final    Basophils Absolute 0.06  0.00 - 0.10 Thousands/µL Final   COMPREHENSIVE METABOLIC PANEL - Abnormal    Sodium 135  135 - 147 mmol/L Final    Potassium 4.3  3.5 - 5.3 mmol/L Final    Chloride 101  96 - 108 mmol/L Final    CO2 28  21 - 32 mmol/L Final    ANION GAP 6  4 - 13 mmol/L Final    BUN 15  5 - 25 mg/dL Final    Creatinine 0.83  0.60 - 1.30 mg/dL Final    Comment: Standardized  to IDMS reference method    Glucose 265 (*) 65 - 140 mg/dL Final    Comment: If the patient is fasting, the ADA then defines impaired fasting glucose as > 100 mg/dL and diabetes as > or equal to 123 mg/dL.    Calcium 8.7  8.4 - 10.2 mg/dL Final    AST 21  13 - 39 U/L Final    ALT 35  7 - 52 U/L Final    Comment: Specimen collection should occur prior to Sulfasalazine administration due to the potential for falsely depressed results.     Alkaline Phosphatase 81  34 - 104 U/L Final    Total Protein 5.9 (*) 6.4 - 8.4 g/dL Final    Albumin 3.7  3.5 - 5.0 g/dL Final    Total Bilirubin 0.36  0.20 - 1.00 mg/dL Final    Comment: Use of this assay is not recommended for patients undergoing treatment with eltrombopag due to the potential for falsely elevated results.  N-acetyl-p-benzoquinone imine (metabolite of Acetaminophen) will generate erroneously low results in samples for patients that have taken an overdose of Acetaminophen.    eGFR 84  ml/min/1.73sq m Final    Narrative:     National Kid[employer] guidelines for Chronic Kidney Disease (CKD):     Stage 1 with normal or high GFR (GFR > 90 mL/min/1.73 square meters)    Stage 2 Mild CKD (GFR = 60-89 mL/min/1.73 square meters)    Stage 3A Moderate CKD (GFR = 45-59 mL/min/1.73 square meters)    Stage 3B Moderate CKD (GFR = 30-44 mL/min/1.73 square meters)    Stage 4 Severe CKD (GFR = 15-29 mL/min/1.73 square meters)    Stage 5 End Stage CKD (GFR <15 mL/min/1.73 square meters)  Note: GFR calculation is accurate only with a steady state creatinine   MAGNESIUM - Abnormal    Magnesium 1.6 (*) 1.9 - 2.7 mg/dL Final   PHOSPHORUS - Abnormal    Phosphorus 2.2 (*) 2.7 - 4.5 mg/dL Final   BLOOD GAS, VENOUS - Abnormal    pH, Artemio 7.415 (*) 7.300 - 7.400 Final    pCO2, Artemio 43.3  42.0 - 50.0 mm Hg Final    pO2, Artemio 24.1 (*) 35.0 - 45.0 mm Hg Final    HCO3, Artemio 27.1  24 - 30 mmol/L Final    Base Excess, Artemio 2.2  mmol/L Final    O2 Content, Artemio 8.6  ml/dL Final    O2 HGB,  "VENOUS 43.0 (*) 60.0 - 80.0 % Final   UA W REFLEX TO MICROSCOPIC WITH REFLEX TO CULTURE - Abnormal    Color, UA Light Yellow   Final    Clarity, UA Clear   Final    Specific Gravity, UA 1.025  1.003 - 1.030 Final    Comment: High concentrations of glucose or protein may affect the specific gravity    pH, UA 6.0  4.5, 5.0, 5.5, 6.0, 6.5, 7.0, 7.5, 8.0 Final    Leukocytes, UA   (*) Negative Final    Value: Elevated glucose may cause decreased leukocyte values. See urine microscopic for UWBC result    Nitrite, UA Negative  Negative Final    Protein, UA Negative  Negative mg/dl Final    Glucose, UA >=1000 (1%) (*) Negative mg/dl Final    Comment: Elevated glucose may cause false negative leukocyte esterase    Ketones, UA 20 (1+) (*) Negative mg/dl Final    Urobilinogen, UA <2.0  <2.0 mg/dl mg/dl Final    Bilirubin, UA Negative  Negative Final    Occult Blood, UA Negative  Negative Final   POCT GLUCOSE - Abnormal    POC Glucose 322 (*) 65 - 140 mg/dl Final   HS TROPONIN I 0HR - Normal    hs TnI 0hr 4  \"Refer to ACS Flowchart\"- see link ng/L Final    Comment:                                              Initial (time 0) result  If >=50 ng/L, Myocardial injury suggested ;  Type of myocardial injury and treatment strategy  to be determined.  If 5-49 ng/L, a delta result at 2 hours and or 4 hours will be needed to further evaluate.  If <4 ng/L, and chest pain has been >3 hours since onset, patient may qualify for discharge based on the HEART score in the ED.  If <5 ng/L and <3hours since onset of chest pain, a delta result at 2 hours will be needed to further evaluate.    HS Troponin 99th Percentile URL of a Health Population=12 ng/L with a 95% Confidence Interval of 8-18 ng/L.    Second Troponin (time 2 hours)  If calculated delta >= 20 ng/L,  Myocardial injury suggested ; Type of myocardial injury and treatment strategy to be determined.  If 5-49 ng/L and the calculated delta is 5-19 ng/L, consult medical service for " "evaluation.  Continue evaluation for ischemia on ecg and other possible etiology and repeat hs troponin at 4 hours.  If delta is <5 ng/L at 2 hours, consider discharge based on risk stratification via the HEART score (if in ED), or SAM risk score in IP/Observation.    HS Troponin 99th Percentile URL of a Health Population=12 ng/L with a 95% Confidence Interval of 8-18 ng/L.   B-TYPE NATRIURETIC PEPTIDE (BNP) - Normal    BNP 19  0 - 100 pg/mL Final   TSH, 3RD GENERATION WITH FREE T4 REFLEX - Normal    TSH 3RD GENERATON 1.600  0.450 - 4.500 uIU/mL Final    Comment: The recommended reference ranges for TSH during pregnancy are as follows:   First trimester 0.100 to 2.500 uIU/mL   Second trimester  0.200 to 3.000 uIU/mL   Third trimester 0.300 to 3.000 uIU/m    Note: Normal ranges may not apply to patients who are transgender, non-binary, or whose legal sex, sex at birth, and gender identity differ.  Adult TSH (3rd generation) reference range follows the recommended guidelines of the [employer], January, 2020.   BETA HYDROXYBUTYRATE - Normal    Beta- Hydroxybutyrate <0.05  0.02 - 0.27 mmol/L Final   HS TROPONIN I 2HR - Normal    hs TnI 2hr 5  \"Refer to ACS Flowchart\"- see link ng/L Final    Comment:                                              Initial (time 0) result  If >=50 ng/L, Myocardial injury suggested ;  Type of myocardial injury and treatment strategy  to be determined.  If 5-49 ng/L, a delta result at 2 hours and or 4 hours will be needed to further evaluate.  If <4 ng/L, and chest pain has been >3 hours since onset, patient may qualify for discharge based on the HEART score in the ED.  If <5 ng/L and <3hours since onset of chest pain, a delta result at 2 hours will be needed to further evaluate.    HS Troponin 99th Percentile URL of a Health Population=12 ng/L with a 95% Confidence Interval of 8-18 ng/L.    Second Troponin (time 2 hours)  If calculated delta >= 20 ng/L,  Myocardial injury " suggested ; Type of myocardial injury and treatment strategy to be determined.  If 5-49 ng/L and the calculated delta is 5-19 ng/L, consult medical service for evaluation.  Continue evaluation for ischemia on ecg and other possible etiology and repeat hs troponin at 4 hours.  If delta is <5 ng/L at 2 hours, consider discharge based on risk stratification via the HEART score (if in ED), or SAM risk score in IP/Observation.    HS Troponin 99th Percentile URL of a Health Population=12 ng/L with a 95% Confidence Interval of 8-18 ng/L.    Delta 2hr hsTnI 1  <20 ng/L Final   URINE MICROSCOPIC - Normal    RBC, UA 1-2  None Seen, 1-2 /hpf Final    WBC, UA 1-2  None Seen, 1-2 /hpf Final    Epithelial Cells Occasional  None Seen, Occasional /hpf Final    Bacteria, UA Occasional  None Seen, Occasional /hpf Final       XR chest 1 view portable   Final Result      No acute cardiopulmonary disease.            Workstation performed: [id number]               Procedures  ECG 12 Lead Documentation Only    Date/Time: 6/14/2024 11:50 AM    Performed by: Asha Chris MD  Authorized by: Asha Chris MD    Comments:      Normal sinus rhythm, ventricular rate 95, parable of 128, QRS 60, QTc 422, normal axis, no ST/T wave changes suggest acute ischemia, no STEMI.  There is poor R wave progression.  Overall, no significant change in prior EKG dated 6/10/2024.    HEART Risk Score      Flowsheet Row Most Recent Value   Heart Score Risk Calculator    History 1 Filed at: 06/14/2024 1536   ECG 0 Filed at: 06/14/2024 1536   Age 1 Filed at: 06/14/2024 1536   Risk Factors 2 Filed at: 06/14/2024 1536   Troponin 0 Filed at: 06/14/2024 1536   HEART Score 4 Filed at: 06/14/2024 1536              ED Course  Medications   magnesium sulfate 2 g/50 mL IVPB (premix) 2 g (0 g Intravenous Stopped 6/14/24 1434)   HYDROmorphone (DILAUDID) injection 0.5 mg (0.5 mg Intravenous Given 6/14/24 1320)   furosemide (LASIX) injection 60 mg (60 mg Intravenous Given  6/14/24 1610)   ketorolac (TORADOL) injection 15 mg (15 mg Intravenous Given 6/14/24 1845)     49-year-old female presenting with upper chest as well as jaw pain, as well as shortness of breath particularly with laying flat and exertion, as well as lower extremity edema.  Vital signs reviewed, within normal limits.  EKG to my interpretation as above, no STEMI.  Chest x-ray to my interpretation without pulmonary edema.  CMP is with hyperglycemia, low phosphorus of 2.2 and low magnesium of 1.6.  BNP is reassuring, high-sensitivity troponin is 4.  CBC without anemia.  Patient admitted to the hospital for further evaluation and treatment of her chest pains and shortness of breath particularly given  underlying coronary artery disease.

## 2024-06-14 NOTE — ASSESSMENT & PLAN NOTE
Wt Readings from Last 3 Encounters:   06/13/24 119 kg (263 lb 3.2 oz)   05/22/24 118 kg (260 lb 3.2 oz)   04/25/24 116 kg (256 lb)   Recent echo in 4//24, preserved EF of 60%  Will consult cardiology  IV Lasix 60 mg x 1 now.  She did take her usual dose of 40 mg daily prior to presentation to the ER.  Continue on IV Lasix 40 mg twice daily  Was recently started on Aldactone which we will continue  Monitor input/output, daily weights, fluid restriction  Serial troponin negative

## 2024-06-15 LAB
ANION GAP SERPL CALCULATED.3IONS-SCNC: 11 MMOL/L (ref 4–13)
BUN SERPL-MCNC: 14 MG/DL (ref 5–25)
CALCIUM SERPL-MCNC: 8 MG/DL (ref 8.4–10.2)
CHLORIDE SERPL-SCNC: 100 MMOL/L (ref 96–108)
CO2 SERPL-SCNC: 25 MMOL/L (ref 21–32)
CREAT SERPL-MCNC: 0.81 MG/DL (ref 0.6–1.3)
ERYTHROCYTE [DISTWIDTH] IN BLOOD BY AUTOMATED COUNT: 12 % (ref 11.6–15.1)
GFR SERPL CREATININE-BSD FRML MDRD: 86 ML/MIN/1.73SQ M
GLUCOSE SERPL-MCNC: 122 MG/DL (ref 65–140)
GLUCOSE SERPL-MCNC: 142 MG/DL (ref 65–140)
GLUCOSE SERPL-MCNC: 182 MG/DL (ref 65–140)
GLUCOSE SERPL-MCNC: 193 MG/DL (ref 65–140)
GLUCOSE SERPL-MCNC: 219 MG/DL (ref 65–140)
HCT VFR BLD AUTO: 40 % (ref 34.8–46.1)
HGB BLD-MCNC: 13.4 G/DL (ref 11.5–15.4)
MAGNESIUM SERPL-MCNC: 2.4 MG/DL (ref 1.9–2.7)
MCH RBC QN AUTO: 29.5 PG (ref 26.8–34.3)
MCHC RBC AUTO-ENTMCNC: 33.5 G/DL (ref 31.4–37.4)
MCV RBC AUTO: 88 FL (ref 82–98)
PLATELET # BLD AUTO: 249 THOUSANDS/UL (ref 149–390)
PMV BLD AUTO: 10 FL (ref 8.9–12.7)
POTASSIUM SERPL-SCNC: 3.6 MMOL/L (ref 3.5–5.3)
RBC # BLD AUTO: 4.54 MILLION/UL (ref 3.81–5.12)
SODIUM SERPL-SCNC: 136 MMOL/L (ref 135–147)
WBC # BLD AUTO: 11.2 THOUSAND/UL (ref 4.31–10.16)

## 2024-06-15 PROCEDURE — 99223 1ST HOSP IP/OBS HIGH 75: CPT | Performed by: INTERNAL MEDICINE

## 2024-06-15 PROCEDURE — 99232 SBSQ HOSP IP/OBS MODERATE 35: CPT | Performed by: PHYSICIAN ASSISTANT

## 2024-06-15 PROCEDURE — 83735 ASSAY OF MAGNESIUM: CPT | Performed by: INTERNAL MEDICINE

## 2024-06-15 PROCEDURE — 82948 REAGENT STRIP/BLOOD GLUCOSE: CPT

## 2024-06-15 PROCEDURE — 85027 COMPLETE CBC AUTOMATED: CPT | Performed by: INTERNAL MEDICINE

## 2024-06-15 PROCEDURE — 80048 BASIC METABOLIC PNL TOTAL CA: CPT | Performed by: INTERNAL MEDICINE

## 2024-06-15 RX ORDER — DULOXETIN HYDROCHLORIDE 30 MG/1
30 CAPSULE, DELAYED RELEASE ORAL DAILY
Status: DISCONTINUED | OUTPATIENT
Start: 2024-06-15 | End: 2024-06-18 | Stop reason: HOSPADM

## 2024-06-15 RX ORDER — CLOPIDOGREL BISULFATE 75 MG/1
75 TABLET ORAL DAILY
Status: DISCONTINUED | OUTPATIENT
Start: 2024-06-15 | End: 2024-06-18 | Stop reason: HOSPADM

## 2024-06-15 RX ORDER — ATORVASTATIN CALCIUM 40 MG/1
40 TABLET, FILM COATED ORAL
Status: DISCONTINUED | OUTPATIENT
Start: 2024-06-15 | End: 2024-06-18 | Stop reason: HOSPADM

## 2024-06-15 RX ORDER — ALPRAZOLAM 0.5 MG/1
1 TABLET ORAL
Status: DISCONTINUED | OUTPATIENT
Start: 2024-06-15 | End: 2024-06-18 | Stop reason: HOSPADM

## 2024-06-15 RX ORDER — LISINOPRIL 10 MG/1
10 TABLET ORAL DAILY
Status: DISCONTINUED | OUTPATIENT
Start: 2024-06-15 | End: 2024-06-18 | Stop reason: HOSPADM

## 2024-06-15 RX ORDER — SPIRONOLACTONE 25 MG/1
25 TABLET ORAL DAILY
Status: DISCONTINUED | OUTPATIENT
Start: 2024-06-15 | End: 2024-06-18 | Stop reason: HOSPADM

## 2024-06-15 RX ORDER — FUROSEMIDE 10 MG/ML
60 INJECTION INTRAMUSCULAR; INTRAVENOUS
Status: DISCONTINUED | OUTPATIENT
Start: 2024-06-15 | End: 2024-06-16

## 2024-06-15 RX ADMIN — METOCLOPRAMIDE 10 MG: 5 INJECTION, SOLUTION INTRAMUSCULAR; INTRAVENOUS at 05:34

## 2024-06-15 RX ADMIN — SPIRONOLACTONE 25 MG: 25 TABLET ORAL at 09:55

## 2024-06-15 RX ADMIN — ATORVASTATIN CALCIUM 40 MG: 40 TABLET, FILM COATED ORAL at 17:28

## 2024-06-15 RX ADMIN — LISINOPRIL 10 MG: 10 TABLET ORAL at 09:55

## 2024-06-15 RX ADMIN — METOCLOPRAMIDE 10 MG: 5 INJECTION, SOLUTION INTRAMUSCULAR; INTRAVENOUS at 21:23

## 2024-06-15 RX ADMIN — MAGNESIUM SULFATE HEPTAHYDRATE 2 G: 40 INJECTION, SOLUTION INTRAVENOUS at 17:28

## 2024-06-15 RX ADMIN — HEPARIN SODIUM 7500 UNITS: 5000 INJECTION INTRAVENOUS; SUBCUTANEOUS at 21:22

## 2024-06-15 RX ADMIN — FUROSEMIDE 60 MG: 10 INJECTION, SOLUTION INTRAMUSCULAR; INTRAVENOUS at 17:28

## 2024-06-15 RX ADMIN — FUROSEMIDE 40 MG: 10 INJECTION, SOLUTION INTRAMUSCULAR; INTRAVENOUS at 09:17

## 2024-06-15 RX ADMIN — ASPIRIN 81 MG: 81 TABLET, COATED ORAL at 09:55

## 2024-06-15 RX ADMIN — METOCLOPRAMIDE 10 MG: 5 INJECTION, SOLUTION INTRAMUSCULAR; INTRAVENOUS at 13:21

## 2024-06-15 RX ADMIN — HEPARIN SODIUM 7500 UNITS: 5000 INJECTION INTRAVENOUS; SUBCUTANEOUS at 13:21

## 2024-06-15 RX ADMIN — Medication 2000 UNITS: at 09:55

## 2024-06-15 RX ADMIN — HEPARIN SODIUM 7500 UNITS: 5000 INJECTION INTRAVENOUS; SUBCUTANEOUS at 05:34

## 2024-06-15 RX ADMIN — CLOPIDOGREL BISULFATE 75 MG: 75 TABLET ORAL at 09:55

## 2024-06-15 NOTE — PLAN OF CARE
Problem: PAIN - ADULT  Goal: Verbalizes/displays adequate comfort level or baseline comfort level  Description: Interventions:  - Encourage patient to monitor pain and request assistance  - Assess pain using appropriate pain scale  - Administer analgesics based on type and severity of pain and evaluate response  - Implement non-pharmacological measures as appropriate and evaluate response  - Consider cultural and social influences on pain and pain management  - Notify physician/advanced practitioner if interventions unsuccessful or patient reports new pain  Outcome: Progressing     Problem: INFECTION - ADULT  Goal: Absence or prevention of progression during hospitalization  Description: INTERVENTIONS:  - Assess and monitor for signs and symptoms of infection  - Monitor lab/diagnostic results  - Monitor all insertion sites, i.e. indwelling lines, tubes, and drains  - Monitor endotracheal if appropriate and nasal secretions for changes in amount and color  - [place] appropriate cooling/warming therapies per order  - Administer medications as ordered  - Instruct and encourage patient and family to use good hand hygiene technique  - Identify and instruct in appropriate isolation precautions for identified infection/condition  Outcome: Progressing

## 2024-06-15 NOTE — ASSESSMENT & PLAN NOTE
Wt Readings from Last 3 Encounters:   06/15/24 116 kg (255 lb 3.2 oz)   06/13/24 119 kg (263 lb 3.2 oz)   05/22/24 118 kg (260 lb 3.2 oz)   Recent echo in 4//24, preserved EF of 60%  Will consult cardiology  IV Lasix 60 mg x 1 in ED.  She did take her usual dose of 40 mg daily prior to presentation to the ER.  Continue on IV Lasix 40 mg twice daily  Was recently started on Aldactone which we will continue  Monitor input/output, daily weights, fluid restriction  Serial troponin negative

## 2024-06-15 NOTE — UTILIZATION REVIEW
Initial Clinical Review    Admission: Date/Time/Statement:   Admission Orders (From admission, onward)       Ordered        06/14/24 1512  INPATIENT ADMISSION  Once            06/14/24 1425  Place in Observation  Once                          Orders Placed This Encounter   Procedures    Place in Observation     Standing Status:   Standing     Number of Occurrences:   1     Order Specific Question:   Level of Care     Answer:   Med Surg [16]    INPATIENT ADMISSION     Standing Status:   Standing     Number of Occurrences:   1     Order Specific Question:   Level of Care     Answer:   Med Surg [16]     Order Specific Question:   Estimated length of stay     Answer:   More than 2 Midnights     Order Specific Question:   Certification     Answer:   I certify that inpatient services are medically necessary for this patient for a duration of greater than two midnights. See H&P and MD Progress Notes for additional information about the patient's course of treatment.     ED Arrival Information       Expected   -    Arrival   6/14/2024 10:44    Acuity   Urgent              Means of arrival   Walk-In    Escorted by   Self    Service   Hospitalist    Admission type   Emergency              Arrival complaint   cp             Chief Complaint   Patient presents with    Chest Pain     Reports CP and SOB since Monday. Was seen in ED Monday for same symptoms. Is now c/o nausea and dizziness. Also reports sugar of 370       Initial Presentation: 49 y.o. female presents to ED from home  with chest tightness for past week, located   in  substernal area radiating to jaw.  Has  intermittent  shortness of breath.  Has diffuse edema   in lower, upper extremities   and increasing abdominal girth  for past few weeks.  Saw cardiology the day prior to this  and given  1 dose  IV  lasix, and plan was to continue  home lasix and possibly  increase dose. Additionally  started on  aldactone, did not yet fill Rx.  Compliant with home meds and diet.   Has noticed  increased weight.   Seen in ED  on  6/10 with same symptoms.  PMH  is   chronic  diastolic  heart failure,  CAD, S/P KSENIA  to LAD,  HTN and  Dm1.  Met SIRS  criteria in ED with  tachycardia  and leukocytosis, likely  D/T  CHF  exacerbation.   No source of infection noted.  CXR  no infiltrate.  Admit  Ip with   Acute/chronic  diastolic  heart failure,  Chest pain, SIRS  and plan is   daily weight  , fluid restrict,  cardiology consult,   IV  lasix, I & O,  and continue home meds.          Anticipated Length of Stay/Certification Statement:  Patient will be admitted on an Inpatient basis with an anticipated length of stay of more than 2 midnights.   Justification for Hospital Stay: CHF exacerbation     Date:   6/15   Day 2:   Cardiology consult  Continue  IV  lasix.   Possibly  re start  metoprolol.   Possibly  a  candidate for  ranexa.   No EKG  changes.  Troponin normal.   BNP  19.    Does complain of constant chest pain, resolves with NTG,  but  avoiding  NTG  due to  severe headaches.Plan  nuclear stress test  Mon   6/17.    Very fatigued with  short distance ambulation.    ED Triage Vitals [06/14/24 1052]   Temperature Pulse Respirations Blood Pressure SpO2 Pain Score   98.2 °F (36.8 °C) 90 19 154/78 98 % 10 - Worst Possible Pain     Weight (last 2 days)       Date/Time Weight    06/15/24 0600 116 (255.2)    06/14/24 1727 117 (258.5)            Vital Signs (last 3 days)       Date/Time Temp Pulse Resp BP MAP (mmHg) SpO2 O2 Device Patient Position - Orthostatic VS Javi Coma Scale Score Pain    06/15/24 11:06:13 98.3 °F (36.8 °C) 82 17 131/73 92 94 % -- -- -- --    06/15/24 07:13:55 98.2 °F (36.8 °C) 80 17 126/64 85 95 % None (Room air) Lying -- --    06/15/24 02:53:38 98 °F (36.7 °C) 77 15 113/59 77 92 % CPAP Lying -- --    06/14/24 23:50:05 98.1 °F (36.7 °C) 82 16 95/54 68 94 % CPAP Lying -- --    06/14/24 2000 -- -- -- -- -- 93 % None (Room air) -- 15 No Pain    06/14/24 19:05:35 98.1 °F (36.7  °C) 80 -- 133/81 98 95 % None (Room air) Lying -- --    06/14/24 1845 -- -- -- -- -- -- -- -- -- 7 06/14/24 1723 -- -- -- -- -- -- -- -- 15 7 06/14/24 1721 -- -- -- -- -- -- -- -- -- 7 06/14/24 17:18:58 97.8 °F (36.6 °C) -- 16 155/81 106 -- -- -- -- --    06/14/24 1600 -- 84 -- 166/74 107 99 % None (Room air) Lying -- --    06/14/24 1400 -- 81 19 -- -- 94 % None (Room air) -- -- --    06/14/24 1340 -- -- -- -- -- -- -- -- 15 --    06/14/24 1320 -- -- -- -- -- -- -- -- -- 10 - Worst Possible Pain    06/14/24 1052 98.2 °F (36.8 °C) 90 19 154/78 -- 98 % None (Room air) Lying -- 10 - Worst Possible Pain              Pertinent Labs/Diagnostic Test Results:   Radiology:  XR chest 1 view portable   Final Interpretation by Braden Melo MD (06/14 1407)      No acute cardiopulmonary disease.            Workstation performed: [id number]               Results from last 7 days   Lab Units 06/15/24  0517 06/14/24  1148 06/10/24  1059   WBC Thousand/uL 11.20* 12.64* 9.26   HEMOGLOBIN g/dL 13.4 13.6 14.9   HEMATOCRIT % 40.0 40.0 44.2   PLATELETS Thousands/uL 249 245 282   TOTAL NEUT ABS Thousands/µL  --  9.24* 5.91         Results from last 7 days   Lab Units 06/15/24  0517 06/14/24  1148 06/10/24  1059   SODIUM mmol/L 136 135 138   POTASSIUM mmol/L 3.6 4.3 3.8   CHLORIDE mmol/L 100 101 99   CO2 mmol/L 25 28 30   ANION GAP mmol/L 11 6 9   BUN mg/dL 14 15 14   CREATININE mg/dL 0.81 0.83 0.96   EGFR ml/min/1.73sq m 86 84 71   CALCIUM mg/dL 8.0* 8.7 9.8   MAGNESIUM mg/dL 2.4 1.6*  --    PHOSPHORUS mg/dL  --  2.2*  --      Results from last 7 days   Lab Units 06/14/24  1148   AST U/L 21   ALT U/L 35   ALK PHOS U/L 81   TOTAL PROTEIN g/dL 5.9*   ALBUMIN g/dL 3.7   TOTAL BILIRUBIN mg/dL 0.36     Results from last 7 days   Lab Units 06/15/24  1107 06/15/24  0746 06/14/24  2045 06/14/24  1704 06/14/24  1054   POC GLUCOSE mg/dl 219* 142* 151* 147* 322*     Results from last 7 days   Lab Units 06/15/24  0517  06/14/24  1148 06/10/24  1059   GLUCOSE RANDOM mg/dL 122 265* 139             Beta- Hydroxybutyrate   Date Value Ref Range Status   06/14/2024 <0.05 0.02 - 0.27 mmol/L Final          Results from last 7 days   Lab Units 06/14/24  1148   PH MARTINEZ  7.415*   PCO2 MARTINEZ mm Hg 43.3   PO2 MARTINEZ mm Hg 24.1*   HCO3 MARTINEZ mmol/L 27.1   BASE EXC MARTINEZ mmol/L 2.2   O2 CONTENT MARTINEZ ml/dL 8.6   O2 HGB, VENOUS % 43.0*             Results from last 7 days   Lab Units 06/14/24  1710 06/14/24  1434 06/14/24  1148 06/10/24  1454 06/10/24  1305 06/10/24  1059   HS TNI 0HR ng/L  --   --  4  --   --  6   HS TNI 2HR ng/L  --  5  --   --  5  --    HSTNI D2 ng/L  --  1  --   --  -1  --    HS TNI 4HR ng/L 5  --   --  4  --   --    HSTNI D4 ng/L 1  --   --  -2  --   --      Results from last 7 days   Lab Units 06/10/24  1120   D-DIMER QUANTITATIVE ug/ml FEU 0.33         Results from last 7 days   Lab Units 06/14/24  1148   TSH 3RD GENERATON uIU/mL 1.600                     Results from last 7 days   Lab Units 06/14/24  1148   BNP pg/mL 19               Results from last 7 days   Lab Units 06/14/24  1220   CLARITY UA  Clear   COLOR UA  Light Yellow   SPEC GRAV UA  1.025   PH UA  6.0   GLUCOSE UA mg/dl >=1000 (1%)*   KETONES UA mg/dl 20 (1+)*   BLOOD UA  Negative   PROTEIN UA mg/dl Negative   NITRITE UA  Negative   BILIRUBIN UA  Negative   UROBILINOGEN UA (BE) mg/dl <2.0   LEUKOCYTES UA  Elevated glucose may cause decreased leukocyte values. See urine microscopic for UWBC result*   WBC UA /hpf 1-2   RBC UA /hpf 1-2   BACTERIA UA /hpf Occasional   EPITHELIAL CELLS WET PREP /hpf Occasional                 ED Treatment-Medication Administration from 06/14/2024 1044 to 06/14/2024 1638         Date/Time Order Dose Route Action     06/14/2024 1241 magnesium sulfate 2 g/50 mL IVPB (premix) 2 g 2 g Intravenous New Bag     06/14/2024 1320 HYDROmorphone (DILAUDID) injection 0.5 mg 0.5 mg Intravenous Given     06/14/2024 1610 furosemide (LASIX) injection 60 mg 60  mg Intravenous Given     06/14/2024 1611 butalbital-acetaminophen-caffeine (FIORICET,ESGIC) -40 mg per tablet 1 tablet 1 tablet Oral Given              Present on Admission:   Type 1 diabetes mellitus with other circulatory complication ([facility])   Essential hypertension   Morbid obesity   Coronary artery disease involving native coronary artery of native heart without angina pectoris   Hyperthyroidism   Acute on chronic diastolic (congestive) heart failure ([facility])      Admitting Diagnosis: Shortness of breath [R06.02]  Chest tightness [R07.89]  Chest pain [R07.9]  Acute on chronic diastolic (congestive) heart failure ([facility]) [I50.33]  Age/Sex: 49 y.o. female  Admission Orders:  Scheduled Medications:  aspirin, 81 mg, Oral, Daily  atorvastatin, 40 mg, Oral, Daily With Dinner  cholecalciferol, 2,000 Units, Oral, Daily  clopidogrel, 75 mg, Oral, Daily  DULoxetine, 30 mg, Oral, Daily  furosemide, 40 mg, Intravenous, BID (diuretic)  heparin (porcine), 7,500 Units, Subcutaneous, Q8H MARYA  lisinopril, 10 mg, Oral, Daily  magnesium sulfate, 2 g, Intravenous, Q24H  metoclopramide, 10 mg, Intravenous, Q8H MARYA  patient maintained insulin pump, 1 each, Subcutaneous, Q8H  spironolactone, 25 mg, Oral, Daily      Continuous IV Infusions:     PRN Meds:  ALPRAZolam, 1 mg, Oral, HS PRN  insulin aspart, 1 Units, Subcutaneous Insulin Pump, Daily PRN        IP CONSULT TO CARDIOLOGY    Network Utilization Review Department  ATTENTION: Please call with any questions or concerns to [phone] and carefully listen to the prompts so that you are directed to the right person. All voicemails are confidential.   For Discharge needs, contact Care Management DC Support Team at [phone] opt. 2  Send all requests for admission clinical reviews, approved or denied determinations and any other requests to dedicated fax number below belonging to the campus where the patient is receiving treatment. List of dedicated fax numbers for the  Facilities:  FACILITY NAME UR FAX NUMBER   ADMISSION DENIALS (Administrative/Medical Necessity) [phone]   DISCHARGE SUPPORT TEAM (NETWORK) [phone]   PARENT CHILD HEALTH (Maternity/NICU/Pediatrics) [phone]   [facility] [phone]   [facility] [phone]   [facility] [phone]   [facility] [phone]   [facility] [phone]   [facility] [phone]   [facility] [phone]   [facility] [phone]   [facility] [phone]   Count includes the [facility] [phone]   [facility] [phone]   [facility] [phone]

## 2024-06-15 NOTE — CONSULTS
Consultation - General Cardiology Team 2  Naina Jamison 49 y.o. female MRN: [id number]  Unit/Bed#: -01 Encounter: [id number]        Assessment & Plan         Naina Jamison is a 49 y.o. year old female with a history of premature CAD s/p KSENIA x 1 after an NSTEMI last year.  Patient has been worsening shortness of breath, weight gain, and now worsening chest pressure with radiation to her neck.      CAD s/p KSENIA x 1 to mLAD after NSTEMI 7/4/2023  On DAPT: ASA + Plavix  Lipitor 40 mg daily  Not on other antianginals at this time  Nitroglycerin causes headaches  Patient was taken off of metoprolol due to complaints of fatigue.  Acute on chronic HFpEF.  Echo done recently showed preserved EF of 60%.  Lasix 80 daily po at home, started recently.   Weight at most recent cardiology office visit was 260 lbs.   Standing weight today is 255 lbs.  Hypertension  On lisinopril 10 mg daily and spironolactone 25 mg daily  Type I DM.  Last A1c of 11%  Hyperlipidemia.  LDL of 49 on atorvastatin 40 mg daily  Bicuspid aortic valve        Plan:  Continue IV Lasix 40 mg twice daily  Will plan for pharmacological nuclear test stress test on Monday  Can consider restarting metoprolol as an antianginal  Continue aspirin and Plavix  Patient may be a good candidate for Ranexa          History of Present Illness   Physician Requesting Consult: Aye Aquino MD  Reason for Consult / Principal Problem: Chest pain and SOB      HPI: Naina Jamison is a 49 y.o. year old female who presented with worsening shortness of breath and chest pain.    She was recently seen outpatient by her primary cardiologist who gave her a dose of IV Lasix and continued her on Lasix 80 mg daily.  However, patient states that she still felt out of breath the next morning and came into the hospital.    At the time of admission there was no changes to EKG, troponins were normal, BNP is 19.  Patient does complain of constant chest pain.  She does state that  the chest pain resolves with nitroglycerin, however she is avoiding taking them as it is causing severe headaches.        Outpatient Cardiologist: Alonso Newell MD      Review of Systems   Respiratory:  Positive for shortness of breath.    Cardiovascular:  Positive for chest pain and leg swelling.     Review of system was conducted and was negative except for as stated in the HPI.      Historical Information   Past Medical History:   Diagnosis Date    Diabetes mellitus (HCC)     Graves disease     High cholesterol     Hypertension      Past Surgical History:   Procedure Laterality Date    ABDOMINAL SURGERY      Gallbladder removal    CARDIAC CATHETERIZATION Left 7/5/2023    Procedure: Cardiac Left Heart Cath;  Surgeon: Elba Nevarez DO;  Location: [facility];  Service: Cardiology    CARDIAC CATHETERIZATION N/A 7/5/2023    Procedure: Cardiac Coronary Angiogram;  Surgeon: Elba Nevarez DO;  Location: [facility];  Service: Cardiology    HYSTERECTOMY      US GUIDED THYROID BIOPSY  10/4/2018     Social History     Substance and Sexual Activity   Alcohol Use Never     Social History     Substance and Sexual Activity   Drug Use Never     Social History     Tobacco Use   Smoking Status Never   Smokeless Tobacco Never     Family History: non-contributory    Meds/Allergies   Hospital Medications:   Current Facility-Administered Medications   Medication Dose Route Frequency    furosemide (LASIX) injection 40 mg  40 mg Intravenous BID (diuretic)    heparin (porcine) subcutaneous injection 7,500 Units  7,500 Units Subcutaneous Q8H [facility]    insulin aspart (NovoLOG) FOR PUMP REFILLS 1 Units  1 Units Subcutaneous Insulin Pump Daily PRN    magnesium sulfate 2 g/50 mL IVPB (premix) 2 g  2 g Intravenous Q24H    metoclopramide (REGLAN) injection 10 mg  10 mg Intravenous Q8H [facility]    PATIENT MAINTAINED INSULIN PUMP 1 each  1 each Subcutaneous Q8H     Home Medications:   Facility-Administered Medications Prior  "to Admission:     [COMPLETED] furosemide (LASIX) injection 40 mg    Medications Prior to Admission:     ALPRAZolam (XANAX) 1 mg tablet    aspirin (ECOTRIN LOW STRENGTH) 81 mg EC tablet    atorvastatin (LIPITOR) 40 mg tablet    capsaicin (ZOSTRIX) 0.025 % cream    cholecalciferol (VITAMIN D3) 1,000 units tablet    clopidogrel (Plavix) 75 mg tablet    Continuous Blood Gluc Sensor (Guardian 4 Glucose Sensor) MISC    Continuous Blood Gluc Transmit (Guardian 4 Transmitter) MISC    DULoxetine (CYMBALTA) 30 mg delayed release capsule    furosemide (LASIX) 80 mg tablet    HYDROcodone-acetaminophen (NORCO) 5-325 mg per tablet    hydrOXYzine HCL (ATARAX) 25 mg tablet    lisinopril (ZESTRIL) 10 mg tablet    nitroglycerin (NITROSTAT) 0.4 mg SL tablet    NovoLOG 100 UNIT/ML injection    PATIENT MAINTAINED INSULIN PUMP    spironolactone (ALDACTONE) 25 mg tablet    Allergies   Allergen Reactions    Milnacipran GI Intolerance    Percocet [Oxycodone-Acetaminophen] Vomiting    Pregabalin Other (See Comments)     Didn't help    Victoza [Liraglutide] GI Intolerance       Objective   Vitals: Blood pressure 126/64, pulse 80, temperature 98.2 °F (36.8 °C), resp. rate 17, height 5' 4\" (1.626 m), weight 116 kg (255 lb 3.2 oz), SpO2 95%.  Orthostatic Blood Pressures      Flowsheet Row Most Recent Value   Blood Pressure 126/64 filed at 06/15/2024 0713   Patient Position - Orthostatic VS Lying filed at 06/15/2024 0253              Invasive Devices       Peripheral Intravenous Line  Duration             Peripheral IV 06/14/24 Right Antecubital <1 day                    Physical Exam  Constitutional:       General: She is not in acute distress.     Appearance: Normal appearance. She is not ill-appearing.   Cardiovascular:      Rate and Rhythm: Normal rate and regular rhythm. No extrasystoles are present.     Chest Wall: PMI is not displaced.      Heart sounds: S1 normal and S2 normal. No murmur heard.     No systolic murmur is present.      No " diastolic murmur is present.      No friction rub. No gallop.   Pulmonary:      Breath sounds: No decreased breath sounds, wheezing or rales.   Musculoskeletal:      Right lower leg: Edema present.      Left lower leg: Edema present.   Neurological:      General: No focal deficit present.      Mental Status: She is oriented to person, place, and time.       Lab Results: I have personally reviewed pertinent lab results.    Results from last 7 days   Lab Units 06/14/24  1710 06/14/24  1434 06/14/24  1148   HS TNI 0HR ng/L  --   --  4   HS TNI 2HR ng/L  --  5  --    HS TNI 4HR ng/L 5  --   --          Results from last 7 days   Lab Units 06/15/24  0517 06/14/24  1148 06/10/24  1059   POTASSIUM mmol/L 3.6 4.3 3.8   CO2 mmol/L 25 28 30   CHLORIDE mmol/L 100 101 99   BUN mg/dL 14 15 14   CREATININE mg/dL 0.81 0.83 0.96     Results from last 7 days   Lab Units 06/15/24  0517 06/14/24  1148 06/10/24  1059   HEMOGLOBIN g/dL 13.4 13.6 14.9   HEMATOCRIT % 40.0 40.0 44.2   PLATELETS Thousands/uL 249 245 282             Telemetry:         ECHO:  No results found for this or any previous visit.    Results for orders placed during the hospital encounter of 04/25/24    Echo complete w/ contrast if indicated    Interpretation Summary    Left Ventricle: Left ventricular cavity size is normal. Wall thickness is normal. The left ventricular ejection fraction is 60%. Systolic function is normal. Although no diagnostic regional wall motion abnormality was identified, this possibility cannot be completely excluded on the basis of this study. Diastolic function is normal.    Aortic Valve: There is moderate stenosis. The aortic valve peak velocity is 2.72 m/s. The aortic valve peak gradient is 30 mmHg. The aortic valve mean gradient is 16 mmHg. The dimensionless velocity index is 0.38. The aortic valve area is 1.51 cm2.    Technically difficult study.  Definity could not be given due to inability to obtain IV access.      DARRICK:  No results  found for this or any previous visit.    Results for orders placed during the hospital encounter of 09/07/23    DARRICK    Interpretation Summary  Images from the original result were not included.      Left Ventricle: Left ventricular cavity size is normal. Wall thickness is normal. The left ventricular ejection fraction is 60%. Systolic function is normal. Wall motion is normal.    Left Atrium: The atrium is dilated. There is no thrombus.    Atrial Septum: There is no atrial septal defect. No patent foramen ovale detected, confirmed at rest using color doppler.    Aortic Valve: The aortic valve is congenitally bicuspid. The leaflets are moderately calcified. There is mildly reduced mobility. There is systolic doming. There is moderate stenosis. The aortic valve peak velocity is 2.5 m/s. The aortic valve peak gradient is 24.0 mmHg. The aortic valve mean gradient is 15.0 mmHg. The dimensionless velocity index is 0.32. The aortic valve area is 1.08 cm2 by continuity VTI, and approximately 1.1 to 1.3 cm2 by planimetry. The stroke volume index is 30.40 ml/m2, suggestive of mildly decreased flow.    Aorta: The aortic root is normal in size. The ascending aorta is normal in size. The ascending aorta is 2.8 cm.    Bicuspid aortic valve anatomy demonstrated with leaflet calcification, a moderate degree of valvular stenosis, and minimal regurgitation. Aortic root and ascending aorta are normal in size.    3D was performed for further investigation, better visualization, and additional quantification of the aortic valve. Results from the utilization of 3D are listed in the report below.      CMR:  No results found for this or any previous visit.    No results found for this or any previous visit.    No results found for this or any previous visit.      HOLTER  No results found for this or any previous visit.    Results for orders placed during the hospital encounter of 04/25/24    Holter monitor    Interpretation  Summary  INDICATIONS: Palpitations    DESCRIPTION OF FINDINGS:  The patient was monitored for a total of 24 hours and 00 minutes.  The patient was predominantly in Normal sinus during the study period.  The average heart rate was 78 beats per minute.  The heart rate ranged from a low of 58 beats per minute at 8:00 AM to a maximum of 126 beats per minute at 3:24 .    Ventricular ectopic activity consisted of 0 beats (0.0% of total beats). There was no sustained or nonsustained ventricular tachycardia.    Supraventricular ectopic activity consisted of 4 beats (0.0% of total beats). There was no evidence of atrial fibrillation or atrial flutter.    There were no significant pauses. The longest R-R interval was 1.1 seconds.  There was no evidence of advanced degree heart block.    Patient diary was attached. None of the reported symptoms correlated with any significant arrhythmias.    Impression  Predominantly normal sinus during the study period with an average HR of 78 bpm ( bpm).  Rare supraventricular and ventricular ectopy burden, mostly in the form of single PACs.  Patient diary was attached. None of the reported symptoms correlated with any significant arrhythmias.      Fellow: Manju Whitten DO  Attending: Yanira Millard MD        VTE Prophylaxis: Heparin       Kendall Coronel MD  Cardiology Fellow   FY-1    ==========================================================================================    Epic/ Allscripts/Care Everywhere records reviewed: y    ** Please Note: Fluency DirectDictation voice to text software may have been used in the creation of this document. **

## 2024-06-15 NOTE — ASSESSMENT & PLAN NOTE
Reports more like chest tightness.  Likely secondary to CHF exacerbation and is improving with diuresis.   History of CAD status post PCI/KSENIA  Serial troponin negative x 2  No acute ischemic changes

## 2024-06-15 NOTE — PROGRESS NOTES
[facility]  Progress Note  Name: Naina Jamison I  MRN: [id number]  Unit/Bed#: -01 I Date of Admission: 6/14/2024   Date of Service: 6/15/2024 I Hospital Day: 1    Assessment & Plan   * Acute on chronic diastolic (congestive) heart failure (HCC)  Assessment & Plan  Wt Readings from Last 3 Encounters:   06/15/24 116 kg (255 lb 3.2 oz)   06/13/24 119 kg (263 lb 3.2 oz)   05/22/24 118 kg (260 lb 3.2 oz)   Recent echo in 4//24, preserved EF of 60%  Will consult cardiology  IV Lasix 60 mg x 1 in ED.  She did take her usual dose of 40 mg daily prior to presentation to the ER.  Continue on IV Lasix 40 mg twice daily  Was recently started on Aldactone which we will continue  Monitor input/output, daily weights, fluid restriction  Serial troponin negative      Chest pain  Assessment & Plan  Reports more like chest tightness.  Likely secondary to CHF exacerbation and is improving with diuresis.   History of CAD status post PCI/KSENIA  Serial troponin negative x 2  No acute ischemic changes    SIRS (systemic inflammatory response syndrome) (HCC)  Assessment & Plan  Meeting SIRS criteria with tachycardia, leukocytosis likely reactive due to CHF exacerbation.   No focal source of infection  Is afebrile  Status post UA, no WBC.  Chest x-ray without infiltrate    Coronary artery disease involving native coronary artery of native heart without angina pectoris  Assessment & Plan  Status post recent PCI/KSENIA to LAD in July/2023  On DAPT with Plavix and aspirin, till July 1, 2024.  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   Patient able and competent to use independently  Fingerstick checks 4 times daily    Essential hypertension  Assessment & Plan  Stable    Morbid obesity  Assessment & Plan  Educated on lifestyle/dietary modification    Hyperthyroidism  Assessment & Plan  In euthyroid state  Has been off methimazole since 2022             VTE Pharmacologic Prophylaxis:   Moderate Risk (Score 3-4) - Pharmacological DVT Prophylaxis Ordered: heparin.    Mobility:   Basic Mobility Inpatient Raw Score: 24  JH-HLM Goal: 8: Walk 250 feet or more  JH-HLM Achieved: 1: Laying in bed  JH-HLM Goal achieved. Continue to encourage appropriate mobility.    Patient Centered Rounds: I performed bedside rounds with nursing staff today.       Education and Discussions with Family / Patient: Patient declined call to .     Total Time Spent on Date of Encounter in care of patient: 35 mins. This time was spent on one or more of the following: performing physical exam; counseling and coordination of care; obtaining or reviewing history; documenting in the medical record; reviewing/ordering tests, medications or procedures; communicating with other healthcare professionals and discussing with patient's family/caregivers.    Current Length of Stay: 1 day(s)  Current Patient Status: Inpatient   Certification Statement: The patient will continue to require additional inpatient hospital stay due to IV lasix  Discharge Plan: Anticipate discharge in 48-72 hrs to home.    Code Status: Level 1 - Full Code    Subjective:   Chest pain is resolved today.  Patient still feels very fatigued with ambulation and short distances.  Still feels edematous    Objective:     Vitals:   Temp (24hrs), Av.1 °F (36.7 °C), Min:97.8 °F (36.6 °C), Max:98.3 °F (36.8 °C)    Temp:  [97.8 °F (36.6 °C)-98.3 °F (36.8 °C)] 98.3 °F (36.8 °C)  HR:  [77-84] 82  Resp:  [15-19] 17  BP: ()/(54-81) 131/73  SpO2:  [92 %-99 %] 94 %  Body mass index is 43.8 kg/m².     Input  and Output Summary (last 24 hours):     Intake/Output Summary (Last 24 hours) at 6/15/2024 1129  Last data filed at 6/15/2024 0900  Gross per 24 hour   Intake 516 ml   Output 400 ml   Net 116 ml       Physical Exam:   Physical Exam  Constitutional:       Appearance: Normal appearance. She is obese.   Cardiovascular:      Rate and Rhythm: Normal rate and regular rhythm.      Heart sounds: No murmur heard.     Comments: Bilateral lower extremity edema  Pulmonary:      Effort: Pulmonary effort is normal.      Breath sounds: Normal breath sounds.   Abdominal:      General: Bowel sounds are normal. There is no distension.      Palpations: Abdomen is soft.      Tenderness: There is no abdominal tenderness.   Skin:     General: Skin is warm and dry.   Neurological:      General: No focal deficit present.      Mental Status: She is alert and oriented to person, place, and time.   Psychiatric:         Mood and Affect: Mood normal.          Additional Data:     Labs:  Results from last 7 days   Lab Units 06/15/24  0517 06/14/24  1148   WBC Thousand/uL 11.20* 12.64*   HEMOGLOBIN g/dL 13.4 13.6   HEMATOCRIT % 40.0 40.0   PLATELETS Thousands/uL 249 245   SEGS PCT %  --  72   LYMPHO PCT %  --  17   MONO PCT %  --  6   EOS PCT %  --  3     Results from last 7 days   Lab Units 06/15/24  0517 06/14/24  1148   SODIUM mmol/L 136 135   POTASSIUM mmol/L 3.6 4.3   CHLORIDE mmol/L 100 101   CO2 mmol/L 25 28   BUN mg/dL 14 15   CREATININE mg/dL 0.81 0.83   ANION GAP mmol/L 11 6   CALCIUM mg/dL 8.0* 8.7   ALBUMIN g/dL  --  3.7   TOTAL BILIRUBIN mg/dL  --  0.36   ALK PHOS U/L  --  81   ALT U/L  --  35   AST U/L  --  21   GLUCOSE RANDOM mg/dL 122 265*         Results from last 7 days   Lab Units 06/15/24  1107 06/15/24  0746 06/14/24  2045 06/14/24  1704 06/14/24  1054   POC GLUCOSE mg/dl 219* 142* 151* 147* 322*               Lines/Drains:  Invasive Devices       Peripheral Intravenous Line  Duration             Peripheral IV 06/14/24 Right  Antecubital <1 day                          Imaging: Reviewed radiology reports from this admission including: chest xray    Recent Cultures (last 7 days):         Last 24 Hours Medication List:   Current Facility-Administered Medications   Medication Dose Route Frequency Provider Last Rate    ALPRAZolam  1 mg Oral HS PRN Ira Joyce, DO      aspirin  81 mg Oral Daily Ira Barbara Joyce, DO      atorvastatin  40 mg Oral Daily With Dinner Irajacob Joyce, DO      cholecalciferol  2,000 Units Oral Daily Ira Barbara Joyce, DO      clopidogrel  75 mg Oral Daily Ira Barbara Isiah, DO      DULoxetine  30 mg Oral Daily Ira Barbara Isiah, DO      furosemide  40 mg Intravenous BID (diuretic) Ira Barbara Joyce, DO      heparin (porcine)  7,500 Units Subcutaneous Q8H [facility] Ira Barbara Joyce, DO      insulin aspart  1 Units Subcutaneous Insulin Pump Daily PRN Irajacob Joyce, DO      lisinopril  10 mg Oral Daily Ira Barbara Joyce, DO      magnesium sulfate  2 g Intravenous Q24H Ira Barbara Joyce, DO 2 g (06/14/24 1848)    metoclopramide  10 mg Intravenous Q8H [facility] Ira Barbara Joyce, DO      patient maintained insulin pump  1 each Subcutaneous Q8H Ira Barbara Joyce, DO      spironolactone  25 mg Oral Daily Ira Barbara Joyce, DO          Today, Patient Was Seen By: Jeni Frank PA-C    **Please Note: This note may have been constructed using a voice recognition system.**

## 2024-06-15 NOTE — ASSESSMENT & PLAN NOTE
Lab Results   Component Value Date    HGBA1C 11.0 (H) 05/08/2024       Recent Labs     06/14/24  1704 06/14/24  2045 06/15/24  0746 06/15/24  1107   POCGLU 147* 151* 142* 219*         Blood Sugar Average: Last 72 hrs:  (P) 196.2  Insulin pump in place.  Patient able and competent to use independently  Fingerstick checks 4 times daily

## 2024-06-16 LAB
ANION GAP SERPL CALCULATED.3IONS-SCNC: 16 MMOL/L (ref 4–13)
BUN SERPL-MCNC: 12 MG/DL (ref 5–25)
CALCIUM SERPL-MCNC: 7.8 MG/DL (ref 8.4–10.2)
CHLORIDE SERPL-SCNC: 101 MMOL/L (ref 96–108)
CO2 SERPL-SCNC: 17 MMOL/L (ref 21–32)
CREAT SERPL-MCNC: 0.77 MG/DL (ref 0.6–1.3)
GFR SERPL CREATININE-BSD FRML MDRD: 92 ML/MIN/1.73SQ M
GLUCOSE SERPL-MCNC: 125 MG/DL (ref 65–140)
GLUCOSE SERPL-MCNC: 134 MG/DL (ref 65–140)
GLUCOSE SERPL-MCNC: 201 MG/DL (ref 65–140)
GLUCOSE SERPL-MCNC: 289 MG/DL (ref 65–140)
GLUCOSE SERPL-MCNC: 322 MG/DL (ref 65–140)
POTASSIUM SERPL-SCNC: 4.5 MMOL/L (ref 3.5–5.3)
SODIUM SERPL-SCNC: 134 MMOL/L (ref 135–147)

## 2024-06-16 PROCEDURE — 99232 SBSQ HOSP IP/OBS MODERATE 35: CPT | Performed by: INTERNAL MEDICINE

## 2024-06-16 PROCEDURE — 82948 REAGENT STRIP/BLOOD GLUCOSE: CPT

## 2024-06-16 PROCEDURE — 99232 SBSQ HOSP IP/OBS MODERATE 35: CPT | Performed by: PHYSICIAN ASSISTANT

## 2024-06-16 PROCEDURE — 80048 BASIC METABOLIC PNL TOTAL CA: CPT | Performed by: PHYSICIAN ASSISTANT

## 2024-06-16 RX ORDER — FUROSEMIDE 10 MG/ML
60 INJECTION INTRAMUSCULAR; INTRAVENOUS
Status: DISCONTINUED | OUTPATIENT
Start: 2024-06-16 | End: 2024-06-18

## 2024-06-16 RX ADMIN — HEPARIN SODIUM 7500 UNITS: 5000 INJECTION INTRAVENOUS; SUBCUTANEOUS at 22:18

## 2024-06-16 RX ADMIN — HEPARIN SODIUM 7500 UNITS: 5000 INJECTION INTRAVENOUS; SUBCUTANEOUS at 13:28

## 2024-06-16 RX ADMIN — METOCLOPRAMIDE 10 MG: 5 INJECTION, SOLUTION INTRAMUSCULAR; INTRAVENOUS at 05:40

## 2024-06-16 RX ADMIN — CLOPIDOGREL BISULFATE 75 MG: 75 TABLET ORAL at 08:30

## 2024-06-16 RX ADMIN — LISINOPRIL 10 MG: 10 TABLET ORAL at 08:30

## 2024-06-16 RX ADMIN — FUROSEMIDE 60 MG: 10 INJECTION, SOLUTION INTRAMUSCULAR; INTRAVENOUS at 12:00

## 2024-06-16 RX ADMIN — Medication 2000 UNITS: at 08:30

## 2024-06-16 RX ADMIN — FUROSEMIDE 60 MG: 10 INJECTION, SOLUTION INTRAMUSCULAR; INTRAVENOUS at 08:30

## 2024-06-16 RX ADMIN — SPIRONOLACTONE 25 MG: 25 TABLET ORAL at 08:30

## 2024-06-16 RX ADMIN — ATORVASTATIN CALCIUM 40 MG: 40 TABLET, FILM COATED ORAL at 17:35

## 2024-06-16 RX ADMIN — ASPIRIN 81 MG: 81 TABLET, COATED ORAL at 08:30

## 2024-06-16 RX ADMIN — HEPARIN SODIUM 7500 UNITS: 5000 INJECTION INTRAVENOUS; SUBCUTANEOUS at 05:40

## 2024-06-16 RX ADMIN — FUROSEMIDE 60 MG: 10 INJECTION, SOLUTION INTRAMUSCULAR; INTRAVENOUS at 17:35

## 2024-06-16 NOTE — PROGRESS NOTES
[facility]  Progress Note  Name: Naina Jamison I  MRN: [id number]  Unit/Bed#: -01 I Date of Admission: 6/14/2024   Date of Service: 6/16/2024 I Hospital Day: 2    Assessment & Plan   * Acute on chronic diastolic (congestive) heart failure (HCC)  Assessment & Plan  Wt Readings from Last 3 Encounters:   06/16/24 115 kg (253 lb 6.4 oz)   06/13/24 119 kg (263 lb 3.2 oz)   05/22/24 118 kg (260 lb 3.2 oz)   Recent echo in 4//24, preserved EF of 60%  Will consult cardiology  IV Lasix 60 mg x 1 in ED.  She did take her usual dose of 40 mg daily prior to presentation to the ER.    Lasix increased to 60mg IV q8h  Tentative c.cath tomorrow if patient can lay flat  Was recently started on Aldactone which we will continue  Monitor input/output, daily weights, fluid restriction  Serial troponin negative      Chest pain  Assessment & Plan  Reports more like chest tightness.  Likely secondary to CHF exacerbation and is improving with diuresis.   History of CAD status post PCI/KSENIA  Serial troponin negative x 2  No acute ischemic changes  Tentative c.cath tomorrow    SIRS (systemic inflammatory response syndrome) (HCC)  Assessment & Plan  Meeting SIRS criteria with tachycardia, leukocytosis likely reactive due to CHF exacerbation.   No focal source of infection  Is afebrile  Status post UA, no WBC.  Chest x-ray without infiltrate    Coronary artery disease involving native coronary artery of native heart without angina pectoris  Assessment & Plan  Status post recent PCI/KSENIA to LAD in July/2023  On DAPT with Plavix and aspirin, till July 1, 2024.  And thereafter to be on aspirin 81 mg daily  Serial troponin negative    Type 1 diabetes mellitus with other circulatory complication (HCC)  Assessment & Plan  Lab Results   Component Value Date    HGBA1C 11.0 (H) 05/08/2024       Recent Labs     06/15/24  1645 06/15/24  2034 06/16/24  0747 06/16/24  1146   POCGLU 182* 193* 125 201*          Blood Sugar Average: Last 72 hrs:  (P) 186.4925256063316158  Insulin pump in place.  Patient able and competent to use independently  Fingerstick checks 4 times daily  Blood sugars uncontrolled per last A1c, but patient states she has a new insulin pump system in place since then    Essential hypertension  Assessment & Plan  Stable    Morbid obesity  Assessment & Plan  Educated on lifestyle/dietary modification    Hyperthyroidism  Assessment & Plan  In euthyroid state  Has been off methimazole since 2022             VTE Pharmacologic Prophylaxis:   Moderate Risk (Score 3-4) - Pharmacological DVT Prophylaxis Ordered: heparin.    Mobility:   Basic Mobility Inpatient Raw Score: 24  JH-HLM Goal: 8: Walk 250 feet or more  JH-HLM Achieved: 8: Walk 250 feet ot more  JH-HLM Goal achieved. Continue to encourage appropriate mobility.    Patient Centered Rounds: I performed bedside rounds with nursing staff today.       Education and Discussions with Family / Patient: Patient declined call to .     Total Time Spent on Date of Encounter in care of patient: 35 mins. This time was spent on one or more of the following: performing physical exam; counseling and coordination of care; obtaining or reviewing history; documenting in the medical record; reviewing/ordering tests, medications or procedures; communicating with other healthcare professionals and discussing with patient's family/caregivers.    Current Length of Stay: 2 day(s)  Current Patient Status: Inpatient   Certification Statement: The patient will continue to require additional inpatient hospital stay due to IV lasix  Discharge Plan: Anticipate discharge in 48-72 hrs to home.    Code Status: Level 1 - Full Code    Subjective:   Continues to feel better, but still with chest heaviness when lying flat    Objective:     Vitals:   Temp (24hrs), Av.6 °F (37 °C), Min:98.4 °F (36.9 °C), Max:98.9 °F (37.2 °C)    Temp:  [98.4 °F (36.9 °C)-98.9 °F  (37.2 °C)] 98.9 °F (37.2 °C)  HR:  [83-95] 95  Resp:  [16-18] 16  BP: (112-152)/(52-71) 127/71  SpO2:  [93 %-95 %] 95 %  Body mass index is 43.5 kg/m².     Input and Output Summary (last 24 hours):     Intake/Output Summary (Last 24 hours) at 6/16/2024 1304  Last data filed at 6/16/2024 1100  Gross per 24 hour   Intake 658 ml   Output 2800 ml   Net -2142 ml       Physical Exam:   Physical Exam  Vitals and nursing note reviewed.   Constitutional:       General: She is not in acute distress.     Appearance: She is well-developed.   HENT:      Head: Normocephalic and atraumatic.   Cardiovascular:      Rate and Rhythm: Normal rate and regular rhythm.      Heart sounds: No murmur heard.     No friction rub.      Comments: Bilateral lower extremity edema  Pulmonary:      Effort: Pulmonary effort is normal. No respiratory distress.      Breath sounds: Normal breath sounds. No wheezing.   Abdominal:      General: Bowel sounds are normal. There is no distension.      Palpations: Abdomen is soft.      Tenderness: There is no abdominal tenderness. There is no guarding or rebound.   Skin:     General: Skin is warm and dry.      Findings: No rash.   Neurological:      Mental Status: She is alert and oriented to person, place, and time.      Cranial Nerves: No cranial nerve deficit.          Additional Data:     Labs:  Results from last 7 days   Lab Units 06/15/24  0517 06/14/24  1148   WBC Thousand/uL 11.20* 12.64*   HEMOGLOBIN g/dL 13.4 13.6   HEMATOCRIT % 40.0 40.0   PLATELETS Thousands/uL 249 245   SEGS PCT %  --  72   LYMPHO PCT %  --  17   MONO PCT %  --  6   EOS PCT %  --  3     Results from last 7 days   Lab Units 06/16/24  0509 06/15/24  0517 06/14/24  1148   SODIUM mmol/L 134*   < > 135   POTASSIUM mmol/L 4.5   < > 4.3   CHLORIDE mmol/L 101   < > 101   CO2 mmol/L 17*   < > 28   BUN mg/dL 12   < > 15   CREATININE mg/dL 0.77   < > 0.83   ANION GAP mmol/L 16*   < > 6   CALCIUM mg/dL 7.8*   < > 8.7   ALBUMIN g/dL  --   --   3.7   TOTAL BILIRUBIN mg/dL  --   --  0.36   ALK PHOS U/L  --   --  81   ALT U/L  --   --  35   AST U/L  --   --  21   GLUCOSE RANDOM mg/dL 134   < > 265*    < > = values in this interval not displayed.         Results from last 7 days   Lab Units 06/16/24  1146 06/16/24  0747 06/15/24  2034 06/15/24  1645 06/15/24  1107 06/15/24  0746 06/14/24  2045 06/14/24  1704 06/14/24  1054   POC GLUCOSE mg/dl 201* 125 193* 182* 219* 142* 151* 147* 322*               Lines/Drains:  Invasive Devices       Peripheral Intravenous Line  Duration             Peripheral IV 06/14/24 Right Antecubital 2 days                          Imaging: No pertinent imaging reviewed.    Recent Cultures (last 7 days):         Last 24 Hours Medication List:   Current Facility-Administered Medications   Medication Dose Route Frequency Provider Last Rate    ALPRAZolam  1 mg Oral HS PRN Ira Joyce, DO      aspirin  81 mg Oral Daily Ira Joyce, DO      atorvastatin  40 mg Oral Daily With Dinner Ira Joyce, DO      cholecalciferol  2,000 Units Oral Daily Ira Joyce, DO      clopidogrel  75 mg Oral Daily Ira Joyce, DO      DULoxetine  30 mg Oral Daily Ira Barbara Joyce, DO      furosemide  60 mg Intravenous TID (diuretic) Julien Glez MD      heparin (porcine)  7,500 Units Subcutaneous Q8H [facility] Ira Joyce,       insulin aspart  1 Units Subcutaneous Insulin Pump Daily PRN Ira Joyce, DO      lisinopril  10 mg Oral Daily Ira Joyce, DO      patient maintained insulin pump  1 each Subcutaneous Q8H Ira Joyce, DO      spironolactone  25 mg Oral Daily Ira Joyce DO          Today, Patient Was Seen By: Jeni Frank PA-C    **Please Note: This note may have been constructed using a voice recognition system.**

## 2024-06-16 NOTE — ASSESSMENT & PLAN NOTE
Reports more like chest tightness.  Likely secondary to CHF exacerbation and is improving with diuresis.   History of CAD status post PCI/KSENIA  Serial troponin negative x 2  No acute ischemic changes  Tentative c.cath tomorrow

## 2024-06-16 NOTE — ASSESSMENT & PLAN NOTE
Lab Results   Component Value Date    HGBA1C 11.0 (H) 05/08/2024       Recent Labs     06/15/24  1645 06/15/24  2034 06/16/24  0747 06/16/24  1146   POCGLU 182* 193* 125 201*         Blood Sugar Average: Last 72 hrs:  (P) 186.6353456458005618  Insulin pump in place.  Patient able and competent to use independently  Fingerstick checks 4 times daily  Blood sugars uncontrolled per last A1c, but patient states she has a new insulin pump system in place since then

## 2024-06-16 NOTE — PLAN OF CARE
Problem: PAIN - ADULT  Goal: Verbalizes/displays adequate comfort level or baseline comfort level  Description: Interventions:  - Encourage patient to monitor pain and request assistance  - Assess pain using appropriate pain scale  - Administer analgesics based on type and severity of pain and evaluate response  - Implement non-pharmacological measures as appropriate and evaluate response  - Consider cultural and social influences on pain and pain management  - Notify physician/advanced practitioner if interventions unsuccessful or patient reports new pain  Outcome: Progressing     Problem: INFECTION - ADULT  Goal: Absence or prevention of progression during hospitalization  Description: INTERVENTIONS:  - Assess and monitor for signs and symptoms of infection  - Monitor lab/diagnostic results  - Monitor all insertion sites, i.e. indwelling lines, tubes, and drains  - Monitor endotracheal if appropriate and nasal secretions for changes in amount and color  - [place] appropriate cooling/warming therapies per order  - Administer medications as ordered  - Instruct and encourage patient and family to use good hand hygiene technique  - Identify and instruct in appropriate isolation precautions for identified infection/condition  Outcome: Progressing  Goal: Absence of fever/infection during neutropenic period  Description: INTERVENTIONS:  - Monitor WBC    Outcome: Progressing     Problem: SAFETY ADULT  Goal: Patient will remain free of falls  Description: INTERVENTIONS:  - Educate patient/family on patient safety including physical limitations  - Instruct patient to call for assistance with activity   - Consult OT/PT to assist with strengthening/mobility   - Keep Call bell within reach  - Keep bed low and locked with side rails adjusted as appropriate  - Keep care items and personal belongings within reach  - Initiate and maintain comfort rounds  - Make Fall Risk Sign visible to staff  - Offer Toileting every ***  Hours, in advance of need  - Initiate/Maintain ***alarm  - Obtain necessary fall risk management equipment: ***  - Apply yellow socks and bracelet for high fall risk patients  - Consider moving patient to room near nurses station  Outcome: Progressing  Goal: Maintain or return to baseline ADL function  Description: INTERVENTIONS:  -  Assess patient's ability to carry out ADLs; assess patient's baseline for ADL function and identify physical deficits which impact ability to perform ADLs (bathing, care of mouth/teeth, toileting, grooming, dressing, etc.)  - Assess/evaluate cause of self-care deficits   - Assess range of motion  - Assess patient's mobility; develop plan if impaired  - Assess patient's need for assistive devices and provide as appropriate  - Encourage maximum independence but intervene and supervise when necessary  - Involve family in performance of ADLs  - Assess for home care needs following discharge   - Consider OT consult to assist with ADL evaluation and planning for discharge  - Provide patient education as appropriate  Outcome: Progressing  Goal: Maintains/Returns to pre admission functional level  Description: INTERVENTIONS:  - Perform AM-PAC 6 Click Basic Mobility/ Daily Activity assessment daily.  - Set and communicate daily mobility goal to care team and patient/family/caregiver.   - Collaborate with rehabilitation services on mobility goals if consulted  - Perform Range of Motion *** times a day.  - Reposition patient every *** hours.  - Dangle patient *** times a day  - Stand patient *** times a day  - Ambulate patient *** times a day  - Out of bed to chair *** times a day   - Out of bed for meals *** times a day  - Out of bed for toileting  - Record patient progress and toleration of activity level   Outcome: Progressing     Problem: DISCHARGE PLANNING  Goal: Discharge to home or other facility with appropriate resources  Description: INTERVENTIONS:  - Identify barriers to discharge  w/patient and caregiver  - Arrange for needed discharge resources and transportation as appropriate  - Identify discharge learning needs (meds, wound care, etc.)  - Arrange for interpretive services to assist at discharge as needed  - Refer to Case Management Department for coordinating discharge planning if the patient needs post-hospital services based on physician/advanced practitioner order or complex needs related to functional status, cognitive ability, or social support system  Outcome: Progressing     Problem: Knowledge Deficit  Goal: Patient/family/caregiver demonstrates understanding of disease process, treatment plan, medications, and discharge instructions  Description: Complete learning assessment and assess knowledge base.  Interventions:  - Provide teaching at level of understanding  - Provide teaching via preferred learning methods  Outcome: Progressing     Problem: CARDIOVASCULAR - ADULT  Goal: Maintains optimal cardiac output and hemodynamic stability  Description: INTERVENTIONS:  - Monitor I/O, vital signs and rhythm  - Monitor for S/S and trends of decreased cardiac output  - Administer and titrate ordered vasoactive medications to optimize hemodynamic stability  - Assess quality of pulses, skin color and temperature  - Assess for signs of decreased coronary artery perfusion  - Instruct patient to report change in severity of symptoms  Outcome: Progressing  Goal: Absence of cardiac dysrhythmias or at baseline rhythm  Description: INTERVENTIONS:  - Continuous cardiac monitoring, vital signs, obtain 12 lead EKG if ordered  - Administer antiarrhythmic and heart rate control medications as ordered  - Monitor electrolytes and administer replacement therapy as ordered  Outcome: Progressing     Problem: RESPIRATORY - ADULT  Goal: Achieves optimal ventilation and oxygenation  Description: INTERVENTIONS:  - Assess for changes in respiratory status  - Assess for changes in mentation and behavior  -  Position to facilitate oxygenation and minimize respiratory effort  - Oxygen administered by appropriate delivery if ordered  - Initiate smoking cessation education as indicated  - Encourage broncho-pulmonary hygiene including cough, deep breathe, Incentive Spirometry  - Assess the need for suctioning and aspirate as needed  - Assess and instruct to report SOB or any respiratory difficulty  - Respiratory Therapy support as indicated  Outcome: Progressing     Problem: METABOLIC, FLUID AND ELECTROLYTES - ADULT  Goal: Electrolytes maintained within normal limits  Description: INTERVENTIONS:  - Monitor labs and assess patient for signs and symptoms of electrolyte imbalances  - Administer electrolyte replacement as ordered  - Monitor response to electrolyte replacements, including repeat lab results as appropriate  - Instruct patient on fluid and nutrition as appropriate  Outcome: Progressing  Goal: Fluid balance maintained  Description: INTERVENTIONS:  - Monitor labs   - Monitor I/O and WT  - Instruct patient on fluid and nutrition as appropriate  - Assess for signs & symptoms of volume excess or deficit  Outcome: Progressing  Goal: Glucose maintained within target range  Description: INTERVENTIONS:  - Monitor Blood Glucose as ordered  - Assess for signs and symptoms of hyperglycemia and hypoglycemia  - Administer ordered medications to maintain glucose within target range  - Assess nutritional intake and initiate nutrition service referral as needed  Outcome: Progressing

## 2024-06-16 NOTE — ASSESSMENT & PLAN NOTE
Wt Readings from Last 3 Encounters:   06/16/24 115 kg (253 lb 6.4 oz)   06/13/24 119 kg (263 lb 3.2 oz)   05/22/24 118 kg (260 lb 3.2 oz)   Recent echo in 4//24, preserved EF of 60%  Will consult cardiology  IV Lasix 60 mg x 1 in ED.  She did take her usual dose of 40 mg daily prior to presentation to the ER.    Lasix increased to 60mg IV q8h  Tentative c.cath tomorrow if patient can lay flat  Was recently started on Aldactone which we will continue  Monitor input/output, daily weights, fluid restriction  Serial troponin negative

## 2024-06-16 NOTE — PROGRESS NOTES
Progress Note - Cardiology   Naina Jamison 49 y.o. 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 Type I diabetic, moderate aortic stenosis from a bicuspid aortic valve. Coronary artery disease with prior PCI to the LAD approximately 1 year ago in the setting of an NSTEMI.    Developing symptoms of shortness of breath with exertion, edema, weight gain. Acute on chronic diastolic congestive heart failure. Separate from this, she is also experiencing symptoms of tightness in the throat and discomfort in her chest which is identical to her prior anginal equivalent which is occurring sometimes even at rest.    Plan:  Acute on chronic diastolic congestive heart failure: Increase diuretic dose to 60 mg of IV Lasix 3 times a day. Renal function and electrolytes remained stable. Dry weight seems to be in the low 240s. Blood pressure remains stable. She is still not able to lie completely flat comfortably.    CAD: History of PCI to the LAD in the setting of an NSTEMI. Her diabetes, type I, was poorly controlled but seems to be much better per her own report now. I am concerned about angina here. She is on dual antiplatelet therapy. We have discussed repeating an ischemic evaluation. Even though her troponin is negative and her ECG is unremarkable, I favor repeating a cardiac catheterization. This is given that she has identical symptoms to her CAD before including episodes at rest which have prompted more than 1 ER visit. For now, she is unable to lie flat but I will  "make her n.p.o. after midnight to see if she could potentially have a cath tomorrow. I will not order the cath yet.    Aortic stenosis: Moderate. Echocardiogram was done in April. Bicuspid aortic valve. Treatment of heart failure as noted above.    Diabetes: Type I diabetic. She is now following with [facility] endocrinology. Cannot be on SGLT2 inhibitor given type 1 diabetes. They have talked about GLP agonist but she was unable to get this from the pharmacy because it was not available. I think she would benefit from that from a weight loss, cardiac, and diabetes perspective so she should have continued efforts to see if the pharmacy can find this in stock.        Subjective/Objective     Subjective:  Patient feels a good response to the IV diuretic when she first gets sick, but then it tapers off pretty quickly. Her weight is down about 2 pounds. She is still somewhere in the neighborhood of 10 pounds above her dry weight.    She is feeling a little less short of breath, but still complains of orthopnea. She has not had any chest discomfort. She has not exerted herself much in the hospital.    Objective:    Vitals: /65 (BP Location: Right arm)   Pulse 84   Temp 98.9 °F (37.2 °C) (Oral)   Resp 16   Ht 5' 4\" (1.626 m)   Wt 115 kg (253 lb 6.4 oz)   SpO2 93%   BMI 43.50 kg/m²   Vitals:    06/15/24 0600 06/16/24 0500   Weight: 116 kg (255 lb 3.2 oz) 115 kg (253 lb 6.4 oz)     Orthostatic Blood Pressures      Flowsheet Row Most Recent Value   Blood Pressure 116/65 filed at 06/16/2024 0709   Patient Position - Orthostatic VS Lying filed at 06/16/2024 0709              Intake/Output Summary (Last 24 hours) at 6/16/2024 0909  Last data filed at 6/16/2024 0826  Gross per 24 hour   Intake 778 ml   Output 3400 ml   Net -2622 ml     Physical Exam:   General appearance: alert and in no acute distress  Head: Normocephalic, without obvious abnormality, atraumatic  Neck: no carotid bruit, no JVD and supple, " symmetrical, trachea midline  Lungs: clear to auscultation bilaterally. Normal air entry. Normal effort.  Heart: S1, S2 normal and no S3 or S4. No murmurs.  Abdomen: soft, non-tender; bowel sounds normal; no masses,  no organomegaly  Extremities: hands swollen. No pitting edema of legs  Pulses: 2+ and symmetric bilaterally  Skin: Skin color, texture, turgor normal. No rashes or lesions  Neurologic: Grossly normal. Alert and oriented.    Medications:    Current Facility-Administered Medications:     ALPRAZolam (XANAX) tablet 1 mg, 1 mg, Oral, HS PRN, Ira Joyce DO    aspirin (ECOTRIN LOW STRENGTH) EC tablet 81 mg, 81 mg, Oral, Daily, Ira Joyce DO, 81 mg at 06/16/24 0830    atorvastatin (LIPITOR) tablet 40 mg, 40 mg, Oral, Daily With Dinner, Ira Joyce DO, 40 mg at 06/15/24 1728    Cholecalciferol (VITAMIN D3) tablet 2,000 Units, 2,000 Units, Oral, Daily, Ira Joyce DO, 2,000 Units at 06/16/24 0830    clopidogrel (PLAVIX) tablet 75 mg, 75 mg, Oral, Daily, Ira Joyce DO, 75 mg at 06/16/24 0830    DULoxetine (CYMBALTA) delayed release capsule 30 mg, 30 mg, Oral, Daily, Ira Joyce DO    furosemide (LASIX) injection 60 mg, 60 mg, Intravenous, TID (diuretic), Julien Glez MD    heparin (porcine) subcutaneous injection 7,500 Units, 7,500 Units, Subcutaneous, Q8H MARYA, Ira Joyce DO, 7,500 Units at 06/16/24 0540    insulin aspart (NovoLOG) FOR PUMP REFILLS 1 Units, 1 Units, Subcutaneous Insulin Pump, Daily PRN, Ira Joyce DO    lisinopril (ZESTRIL) tablet 10 mg, 10 mg, Oral, Daily, Ira Joyce DO, 10 mg at 06/16/24 0830    magnesium sulfate 2 g/50 mL IVPB (premix) 2 g, 2 g, Intravenous, Q24H, Ira Joyce DO, Last Rate: 50 mL/hr at 06/15/24 1728, 2 g at 06/15/24 1728    metoclopramide (REGLAN) injection 10 mg, 10 mg, Intravenous, Q8H MARYA, Ira Joyce DO, 10 mg at 06/16/24  0540    PATIENT MAINTAINED INSULIN PUMP 1 each, 1 each, Subcutaneous, Q8H, Ira Joyce DO, 1 each at 06/16/24 0830    spironolactone (ALDACTONE) tablet 25 mg, 25 mg, Oral, Daily, Ira Joyce DO, 25 mg at 06/16/24 0830    Lab Results:      Results from last 7 days   Lab Units 06/15/24  0517 06/14/24  1148 06/10/24  1059   WBC Thousand/uL 11.20* 12.64* 9.26   HEMOGLOBIN g/dL 13.4 13.6 14.9   HEMATOCRIT % 40.0 40.0 44.2   PLATELETS Thousands/uL 249 245 282         Results from last 7 days   Lab Units 06/16/24  0509 06/15/24  0517 06/14/24  1148   SODIUM mmol/L 134* 136 135   POTASSIUM mmol/L 4.5 3.6 4.3   CHLORIDE mmol/L 101 100 101   CO2 mmol/L 17* 25 28   BUN mg/dL 12 14 15   CREATININE mg/dL 0.77 0.81 0.83   CALCIUM mg/dL 7.8* 8.0* 8.7   ALK PHOS U/L  --   --  81   ALT U/L  --   --  35   AST U/L  --   --  21         Results from last 7 days   Lab Units 06/15/24  0517 06/14/24  1148   MAGNESIUM mg/dL 2.4 1.6*       Telemetry: Personally reviewed.     Echo;  Left Ventricle: Left ventricular cavity size is normal. Wall thickness is normal. The left ventricular ejection fraction is 60%. Systolic function is normal. Although no diagnostic regional wall motion abnormality was identified, this possibility cannot be completely excluded on the basis of this study. Diastolic function is normal.    Aortic Valve: There is moderate stenosis. The aortic valve peak velocity is 2.72 m/s. The aortic valve peak gradient is 30 mmHg. The aortic valve mean gradient is 16 mmHg. The dimensionless velocity index is 0.38. The aortic valve area is 1.51 cm2.    Technically difficult study.  Definity could not be given due to inability to obtain IV access.

## 2024-06-16 NOTE — ED PROVIDER NOTES
History  Chief Complaint   Patient presents with    Chest Pain     Reports CP and SOB since Monday. Was seen in ED Monday for same symptoms. Is now c/o nausea and dizziness. Also reports sugar of 370     HPI  Patient is 48-year-old female presenting for upper chest pain with radiation to the jaw accompanied by shortness of breath that is worsened by lying flat.  Patient was recently seen in a separate ER for similar symptoms, states she had her diuretic medications adjusted, was seen by cardiologist yesterday.  Past medical history significant for CAD, prior stents, CHF, type 1 diabetes, hyperlipidemia, bicuspid aortic valve.  Patient also reports nausea but no vomiting, lightheadedness/dizziness.  Patient denies any abdominal pain.  Prior to Admission Medications   Prescriptions Last Dose Informant Patient Reported? Taking?   ALPRAZolam (XANAX) 1 mg tablet  Self Yes No   Sig: Take 1 mg by mouth daily at bedtime as needed for anxiety Pt unable to verify dose for RN   Continuous Blood Gluc Sensor (Guardian 4 Glucose Sensor) MISC  Self No No   Sig: Use 1 each every 7 days   Continuous Blood Gluc Transmit (Guardian 4 Transmitter) MISC  Self No No   Sig: To check blood sugars continuously   DULoxetine (CYMBALTA) 30 mg delayed release capsule  Self Yes No   Sig: Take 30 mg by mouth daily Pt unable to verify dose for RN   HYDROcodone-acetaminophen (NORCO) 5-325 mg per tablet  Self Yes No   Sig: Take 1 tablet by mouth every 6 (six) hours as needed for pain Pt unable to verify dose for RN   NovoLOG 100 UNIT/ML injection  Self No No   Sig: To be used in insulin pump approximately 120 units a day   PATIENT MAINTAINED INSULIN PUMP  Self Yes No   Sig: Inject 1 each under the skin every 8 (eight) hours   aspirin (ECOTRIN LOW STRENGTH) 81 mg EC tablet  Self No No   Sig: Take 1 tablet (81 mg total) by mouth daily Do not start before July 7, 2023.   atorvastatin (LIPITOR) 40 mg tablet  Self Yes No   capsaicin (ZOSTRIX) 0.025 %  cream   No No   Sig: Apply 1 Application topically 1 (one) time for 1 dose   cholecalciferol (VITAMIN D3) 1,000 units tablet  Self Yes No   Sig: Take 2,000 Units by mouth daily Pt unable to verify dose for RN   clopidogrel (Plavix) 75 mg tablet  Self No No   Sig: Take 1 tablet (75 mg total) by mouth daily   furosemide (LASIX) 80 mg tablet   No No   Sig: Take 1 tablet (80 mg total) by mouth daily   hydrOXYzine HCL (ATARAX) 25 mg tablet  Self Yes No   Sig: Take 25 mg by mouth every 6 (six) hours as needed for itching Pt unable to verify dose for RN   Patient not taking: Reported on 4/11/2024   lisinopril (ZESTRIL) 10 mg tablet   No No   Sig: Take 1 tablet (10 mg total) by mouth daily   nitroglycerin (NITROSTAT) 0.4 mg SL tablet  Self No No   Sig: Place 1 tablet (0.4 mg total) under the tongue every 5 (five) minutes as needed for chest pain   spironolactone (ALDACTONE) 25 mg tablet   No No   Sig: Take 1 tablet (25 mg total) by mouth daily      Facility-Administered Medications Last Administration Doses Remaining   furosemide (LASIX) injection 40 mg 6/13/2024  4:30 PM 0          Past Medical History:   Diagnosis Date    Diabetes mellitus (HCC)     Graves disease     High cholesterol     Hypertension        Past Surgical History:   Procedure Laterality Date    ABDOMINAL SURGERY      Gallbladder removal    CARDIAC CATHETERIZATION Left 7/5/2023    Procedure: Cardiac Left Heart Cath;  Surgeon: Elba Nevarez DO;  Location: [facility];  Service: Cardiology    CARDIAC CATHETERIZATION N/A 7/5/2023    Procedure: Cardiac Coronary Angiogram;  Surgeon: Elba Nevarez DO;  Location: [facility];  Service: Cardiology    HYSTERECTOMY      US GUIDED THYROID BIOPSY  10/4/2018       History reviewed. No pertinent family history.  I have reviewed and agree with the history as documented.    E-Cigarette/Vaping    E-Cigarette Use Never User      E-Cigarette/Vaping Substances    Nicotine No     THC No     CBD No      Flavoring No     Other No     Unknown No      Social History     Tobacco Use    Smoking status: Never    Smokeless tobacco: Never   Vaping Use    Vaping status: Never Used   Substance Use Topics    Alcohol use: Never    Drug use: Never        Review of Systems   Constitutional:  Positive for fatigue.   HENT: Negative.     Eyes: Negative.    Respiratory:  Positive for shortness of breath.    Cardiovascular:  Positive for chest pain.        Chest pain radiation to jaw.   Gastrointestinal:  Positive for nausea. Negative for diarrhea and vomiting.   Endocrine: Negative.    Genitourinary: Negative.    Musculoskeletal: Negative.    Skin: Negative.    Allergic/Immunologic: Negative.    Neurological:  Positive for dizziness and light-headedness.   Hematological: Negative.    Psychiatric/Behavioral: Negative.     All other systems reviewed and are negative.      Physical Exam  ED Triage Vitals   Temperature Pulse Respirations Blood Pressure SpO2   06/14/24 1052 06/14/24 1052 06/14/24 1052 06/14/24 1052 06/14/24 1052   98.2 °F (36.8 °C) 90 19 154/78 98 %      Temp Source Heart Rate Source Patient Position - Orthostatic VS BP Location FiO2 (%)   06/14/24 1905 06/14/24 1400 06/14/24 1052 06/14/24 1052 --   Oral Monitor Lying Right arm       Pain Score       06/14/24 1052       10 - Worst Possible Pain             Orthostatic Vital Signs  Vitals:    06/15/24 2155 06/16/24 0709 06/16/24 1048 06/16/24 1505   BP: 112/52 116/65 127/71 130/67   Pulse: 83 84 95 88   Patient Position - Orthostatic VS:  Lying  Lying       Physical Exam  Vitals and nursing note reviewed.   Constitutional:       General: She is not in acute distress.     Appearance: Normal appearance. She is normal weight. She is ill-appearing.      Comments: Patient appears grossly ill-appearing but not in any acute distress.  Speaking in full sentences.   HENT:      Head: Normocephalic and atraumatic.      Right Ear: Tympanic membrane, ear canal and external ear normal.       Left Ear: Tympanic membrane, ear canal and external ear normal.      Nose: Nose normal.      Mouth/Throat:      Mouth: Mucous membranes are moist.      Pharynx: Oropharynx is clear.   Eyes:      Extraocular Movements: Extraocular movements intact.      Conjunctiva/sclera: Conjunctivae normal.      Pupils: Pupils are equal, round, and reactive to light.   Cardiovascular:      Rate and Rhythm: Normal rate and regular rhythm.      Pulses: Normal pulses.      Heart sounds: Normal heart sounds.   Pulmonary:      Effort: Pulmonary effort is normal.      Breath sounds: Normal breath sounds.   Abdominal:      General: Abdomen is flat. Bowel sounds are normal.      Palpations: Abdomen is soft.   Musculoskeletal:         General: Normal range of motion.      Cervical back: Normal range of motion and neck supple.   Skin:     General: Skin is warm and dry.      Capillary Refill: Capillary refill takes less than 2 seconds.   Neurological:      General: No focal deficit present.      Mental Status: She is alert and oriented to person, place, and time.   Psychiatric:         Mood and Affect: Mood normal.         Behavior: Behavior normal.         Thought Content: Thought content normal.         Judgment: Judgment normal.         ED Medications  Medications   ALPRAZolam (XANAX) tablet 1 mg (has no administration in time range)   aspirin (ECOTRIN LOW STRENGTH) EC tablet 81 mg (81 mg Oral Given 6/16/24 0830)   atorvastatin (LIPITOR) tablet 40 mg (40 mg Oral Given 6/15/24 1728)   Cholecalciferol (VITAMIN D3) tablet 2,000 Units (2,000 Units Oral Given 6/16/24 0830)   clopidogrel (PLAVIX) tablet 75 mg (75 mg Oral Given 6/16/24 0830)   DULoxetine (CYMBALTA) delayed release capsule 30 mg (30 mg Oral Not Given 6/16/24 0829)   lisinopril (ZESTRIL) tablet 10 mg (10 mg Oral Given 6/16/24 0830)   spironolactone (ALDACTONE) tablet 25 mg (25 mg Oral Given 6/16/24 0830)   heparin (porcine) subcutaneous injection 7,500 Units (7,500 Units  Subcutaneous Given 6/16/24 1328)   insulin aspart (NovoLOG) FOR PUMP REFILLS 1 Units (has no administration in time range)   PATIENT MAINTAINED INSULIN PUMP 1 each (1 each Subcutaneous Given by Other 6/16/24 0830)   furosemide (LASIX) injection 60 mg (60 mg Intravenous Given 6/16/24 1200)   magnesium sulfate 2 g/50 mL IVPB (premix) 2 g (0 g Intravenous Stopped 6/14/24 1434)   HYDROmorphone (DILAUDID) injection 0.5 mg (0.5 mg Intravenous Given 6/14/24 1320)   furosemide (LASIX) injection 60 mg (60 mg Intravenous Given 6/14/24 1610)   butalbital-acetaminophen-caffeine (FIORICET,ESGIC) -40 mg per tablet 1 tablet (1 tablet Oral Given 6/14/24 1611)   ketorolac (TORADOL) injection 15 mg (15 mg Intravenous Given 6/14/24 1845)       Diagnostic Studies  Results Reviewed       Procedure Component Value Units Date/Time    Magnesium [[id number]]  (Normal) Collected: 06/15/24 0517    Lab Status: Final result Specimen: Blood from Arm, Left Updated: 06/15/24 0700     Magnesium 2.4 mg/dL     Basic metabolic panel [[id number]]  (Abnormal) Collected: 06/15/24 0517    Lab Status: Final result Specimen: Blood from Arm, Left Updated: 06/15/24 0700     Sodium 136 mmol/L      Potassium 3.6 mmol/L      Chloride 100 mmol/L      CO2 25 mmol/L      ANION GAP 11 mmol/L      BUN 14 mg/dL      Creatinine 0.81 mg/dL      Glucose 122 mg/dL      Calcium 8.0 mg/dL      eGFR 86 ml/min/1.73sq m     Narrative:      National Kidney Disease Foundation guidelines for Chronic Kidney Disease (CKD):     Stage 1 with normal or high GFR (GFR > 90 mL/min/1.73 square meters)    Stage 2 Mild CKD (GFR = 60-89 mL/min/1.73 square meters)    Stage 3A Moderate CKD (GFR = 45-59 mL/min/1.73 square meters)    Stage 3B Moderate CKD (GFR = 30-44 mL/min/1.73 square meters)    Stage 4 Severe CKD (GFR = 15-29 mL/min/1.73 square meters)    Stage 5 End Stage CKD (GFR <15 mL/min/1.73 square meters)  Note: GFR calculation is accurate only with a steady state creatinine    CBC  (With Platelets) [[id number]]  (Abnormal) Collected: 06/15/24 0517    Lab Status: Final result Specimen: Blood from Arm, Left Updated: 06/15/24 0625     WBC 11.20 Thousand/uL      RBC 4.54 Million/uL      Hemoglobin 13.4 g/dL      Hematocrit 40.0 %      MCV 88 fL      MCH 29.5 pg      MCHC 33.5 g/dL      RDW 12.0 %      Platelets 249 Thousands/uL      MPV 10.0 fL     HS Troponin I 4hr [[id number]]  (Normal) Collected: 06/14/24 1710    Lab Status: Final result Specimen: Blood from Hand, Left Updated: 06/14/24 1744     hs TnI 4hr 5 ng/L      Delta 4hr hsTnI 1 ng/L     HS Troponin I 2hr [[id number]]  (Normal) Collected: 06/14/24 1434    Lab Status: Final result Specimen: Blood from Arm, Right Updated: 06/14/24 1508     hs TnI 2hr 5 ng/L      Delta 2hr hsTnI 1 ng/L     Urine Microscopic [[id number]]  (Normal) Collected: 06/14/24 1220    Lab Status: Final result Specimen: Urine, Clean Catch Updated: 06/14/24 1319     RBC, UA 1-2 /hpf      WBC, UA 1-2 /hpf      Epithelial Cells Occasional /hpf      Bacteria, UA Occasional /hpf     TSH, 3rd generation with Free T4 reflex [[id number]]  (Normal) Collected: 06/14/24 1148    Lab Status: Final result Specimen: Blood from Arm, Right Updated: 06/14/24 1244     TSH 3RD GENERATON 1.600 uIU/mL     UA w Reflex to Microscopic w Reflex to Culture [[id number]]  (Abnormal) Collected: 06/14/24 1220    Lab Status: Final result Specimen: Urine, Clean Catch Updated: 06/14/24 1243     Color, UA Light Yellow     Clarity, UA Clear     Specific Gravity, UA 1.025     pH, UA 6.0     Leukocytes, UA Elevated glucose may cause decreased leukocyte values. See urine microscopic for [facility] result     Nitrite, UA Negative     Protein, UA Negative mg/dl      Glucose, UA >=1000 (1%) mg/dl      Ketones, UA 20 (1+) mg/dl      Urobilinogen, UA <2.0 mg/dl      Bilirubin, UA Negative     Occult Blood, UA Negative    HS Troponin 0hr (reflex protocol) [[id number]]  (Normal) Collected: 06/14/24 1148    Lab Status: Final  result Specimen: Blood from Arm, Right Updated: 06/14/24 1224     hs TnI 0hr 4 ng/L     B-Type Natriuretic Peptide(BNP) [[id number]]  (Normal) Collected: 06/14/24 1148    Lab Status: Final result Specimen: Blood from Arm, Right Updated: 06/14/24 1223     BNP 19 pg/mL     Comprehensive metabolic panel [[id number]]  (Abnormal) Collected: 06/14/24 1148    Lab Status: Final result Specimen: Blood from Arm, Right Updated: 06/14/24 1217     Sodium 135 mmol/L      Potassium 4.3 mmol/L      Chloride 101 mmol/L      CO2 28 mmol/L      ANION GAP 6 mmol/L      BUN 15 mg/dL      Creatinine 0.83 mg/dL      Glucose 265 mg/dL      Calcium 8.7 mg/dL      AST 21 U/L      ALT 35 U/L      Alkaline Phosphatase 81 U/L      Total Protein 5.9 g/dL      Albumin 3.7 g/dL      Total Bilirubin 0.36 mg/dL      eGFR 84 ml/min/1.73sq m     Narrative:      National Kidney Disease Foundation guidelines for Chronic Kidney Disease (CKD):     Stage 1 with normal or high GFR (GFR > 90 mL/min/1.73 square meters)    Stage 2 Mild CKD (GFR = 60-89 mL/min/1.73 square meters)    Stage 3A Moderate CKD (GFR = 45-59 mL/min/1.73 square meters)    Stage 3B Moderate CKD (GFR = 30-44 mL/min/1.73 square meters)    Stage 4 Severe CKD (GFR = 15-29 mL/min/1.73 square meters)    Stage 5 End Stage CKD (GFR <15 mL/min/1.73 square meters)  Note: GFR calculation is accurate only with a steady state creatinine    Magnesium [[id number]]  (Abnormal) Collected: 06/14/24 1148    Lab Status: Final result Specimen: Blood from Arm, Right Updated: 06/14/24 1217     Magnesium 1.6 mg/dL     Phosphorus [[id number]]  (Abnormal) Collected: 06/14/24 1148    Lab Status: Final result Specimen: Blood from Arm, Right Updated: 06/14/24 1217     Phosphorus 2.2 mg/dL     Beta Hydroxybutyrate [[id number]]  (Normal) Collected: 06/14/24 1148    Lab Status: Final result Specimen: Blood from Arm, Right Updated: 06/14/24 1217     Beta- Hydroxybutyrate <0.05 mmol/L     Blood gas, venous [[id number]]   (Abnormal) Collected: 06/14/24 1148    Lab Status: Final result Specimen: Blood from Arm, Right Updated: 06/14/24 1203     pH, Artemio 7.415     pCO2, Artemio 43.3 mm Hg      pO2, Artemio 24.1 mm Hg      HCO3, Artemio 27.1 mmol/L      Base Excess, Artemio 2.2 mmol/L      O2 Content, Artemio 8.6 ml/dL      O2 HGB, VENOUS 43.0 %     CBC and differential [[id number]]  (Abnormal) Collected: 06/14/24 1148    Lab Status: Final result Specimen: Blood from Arm, Right Updated: 06/14/24 1201     WBC 12.64 Thousand/uL      RBC 4.63 Million/uL      Hemoglobin 13.6 g/dL      Hematocrit 40.0 %      MCV 86 fL      MCH 29.4 pg      MCHC 34.0 g/dL      RDW 11.9 %      MPV 10.1 fL      Platelets 245 Thousands/uL      nRBC 0 /100 WBCs      Segmented % 72 %      Immature Grans % 1 %      Lymphocytes % 17 %      Monocytes % 6 %      Eosinophils Relative 3 %      Basophils Relative 1 %      Absolute Neutrophils 9.24 Thousands/µL      Absolute Immature Grans 0.07 Thousand/uL      Absolute Lymphocytes 2.14 Thousands/µL      Absolute Monocytes 0.78 Thousand/µL      Eosinophils Absolute 0.35 Thousand/µL      Basophils Absolute 0.06 Thousands/µL     Fingerstick Glucose (POCT) [[id number]]  (Abnormal) Collected: 06/14/24 1054    Lab Status: Final result Specimen: Blood Updated: 06/14/24 1055     POC Glucose 322 mg/dl                    XR chest 1 view portable   Final Result by Braden Melo MD (06/14 1407)      No acute cardiopulmonary disease.            Workstation performed: [id number]               Procedures  ECG 12 Lead Documentation Only    Date/Time: 6/14/2024 11:57 AM    Performed by: Kavon Wong MD  Authorized by: Kavon Wong MD    Indications / Diagnosis:  Shortness of breath  ECG reviewed by me, the ED Provider: yes    Patient location:  ED  Interpretation:     Interpretation: normal    Rate:     ECG rate assessment: normal    Rhythm:     Rhythm: sinus rhythm    Ectopy:     Ectopy: none    QRS:     QRS axis:  Normal    QRS intervals:   Normal  Conduction:     Conduction: normal    ST segments:     ST segments:  Normal  T waves:     T waves: normal          ED Course  ED Course as of 06/16/24 1543   Fri Jun 14, 2024   1243 hs TnI 0hr: 4             HEART Risk Score      Flowsheet Row Most Recent Value   Heart Score Risk Calculator    History 1 Filed at: 06/14/2024 1543   ECG 0 Filed at: 06/14/2024 1543   Age 1 Filed at: 06/14/2024 1543   Risk Factors 2 Filed at: 06/14/2024 1543   Troponin 0 Filed at: 06/14/2024 1543   HEART Score 4 Filed at: 06/14/2024 1543                        SBIRT 20yo+      Flowsheet Row Most Recent Value   Initial Alcohol Screen: US AUDIT-C     1. How often do you have a drink containing alcohol? 0 Filed at: 06/14/2024 1053   2. How many drinks containing alcohol do you have on a typical day you are drinking?  0 Filed at: 06/14/2024 1053   3a. Male UNDER 65: How often do you have five or more drinks on one occasion? 0 Filed at: 06/14/2024 1053   3b. FEMALE Any Age, or MALE 65+: How often do you have 4 or more drinks on one occassion? 0 Filed at: 06/14/2024 1053   Audit-C Score 0 Filed at: 06/14/2024 1053   OTIS: How many times in the past year have you...    Used an illegal drug or used a prescription medication for non-medical reasons? Never Filed at: 06/14/2024 1053                  Medical Decision Making  Patient is 49-year-old female presenting for shortness of breath, chest pain.  DDx: ACS, volume overload/CHF exacerbation, arrhythmia, anemia, dissection, pneumothorax, pneumonia, DKA  Based on patient presentation physical exam finds, primary concern is for volume overload given recent adjustments and diuretic medication.  Will also obtain DKA workup given diabetic history with increased urinary frequency and nausea.  Given this is second ER visit in 3 days and patient has not had resolution of symptoms most likely plan for admission.  Full cardiac workup obtained.  Chest x-ray concerning for volume overload.   Discussed with medical team who understands and agrees with plan.  Ready for admission.    Problems Addressed:  Chest tightness: acute illness or injury  Shortness of breath: acute illness or injury    Amount and/or Complexity of Data Reviewed  Labs: ordered. Decision-making details documented in ED Course.  Radiology: ordered.    Risk  Prescription drug management.  Decision regarding hospitalization.          Disposition  Final diagnoses:   Shortness of breath   Chest tightness     Time reflects when diagnosis was documented in both MDM as applicable and the Disposition within this note       Time User Action Codes Description Comment    6/14/2024  2:25 PM Kavon Wong Add [R06.02] Shortness of breath     6/14/2024  2:25 PM Kavon Wong Add [R07.89] Chest tightness     6/14/2024  3:12 PM Ira Joyce Add [I50.33] Acute on chronic diastolic (congestive) heart failure (HCC)           ED Disposition       ED Disposition   Admit    Condition   Stable    Date/Time   Fri Jun 14, 2024 9397    Comment                  Follow-up Information    None         Current Discharge Medication List        CONTINUE these medications which have NOT CHANGED    Details   ALPRAZolam (XANAX) 1 mg tablet Take 1 mg by mouth daily at bedtime as needed for anxiety Pt unable to verify dose for RN      aspirin (ECOTRIN LOW STRENGTH) 81 mg EC tablet Take 1 tablet (81 mg total) by mouth daily Do not start before July 7, 2023.  Refills: 0    Associated Diagnoses: NSTEMI (non-ST elevated myocardial infarction) ([facility])      atorvastatin (LIPITOR) 40 mg tablet       capsaicin (ZOSTRIX) 0.025 % cream Apply 1 Application topically 1 (one) time for 1 dose  Qty: 120 g, Refills: 2    Associated Diagnoses: Type 1 diabetes mellitus without complication ([facility]); Neuropathic pain of both feet      cholecalciferol (VITAMIN D3) 1,000 units tablet Take 2,000 Units by mouth daily Pt unable to verify dose for RN      clopidogrel (Plavix) 75 mg tablet Take 1 tablet  (75 mg total) by mouth daily  Qty: 90 tablet, Refills: 1    Associated Diagnoses: Coronary artery disease involving native coronary artery of native heart without angina pectoris      Continuous Blood Gluc Sensor (Guardian 4 Glucose Sensor) MISC Use 1 each every 7 days  Qty: 15 each, Refills: 0    Associated Diagnoses: Type 1 diabetes mellitus with other circulatory complication ([facility])      Continuous Blood Gluc Transmit (Guardian 4 Transmitter) MISC To check blood sugars continuously  Qty: 1 each, Refills: 0    Associated Diagnoses: Type 1 diabetes mellitus with other circulatory complication ([facility])      DULoxetine (CYMBALTA) 30 mg delayed release capsule Take 30 mg by mouth daily Pt unable to verify dose for RN      furosemide (LASIX) 80 mg tablet Take 1 tablet (80 mg total) by mouth daily  Qty: 30 tablet, Refills: 3    Associated Diagnoses: Coronary artery disease involving native coronary artery of native heart without angina pectoris      HYDROcodone-acetaminophen (NORCO) 5-325 mg per tablet Take 1 tablet by mouth every 6 (six) hours as needed for pain Pt unable to verify dose for RN      hydrOXYzine HCL (ATARAX) 25 mg tablet Take 25 mg by mouth every 6 (six) hours as needed for itching Pt unable to verify dose for RN      lisinopril (ZESTRIL) 10 mg tablet Take 1 tablet (10 mg total) by mouth daily  Qty: 90 tablet, Refills: 3    Associated Diagnoses: Essential hypertension      nitroglycerin (NITROSTAT) 0.4 mg SL tablet Place 1 tablet (0.4 mg total) under the tongue every 5 (five) minutes as needed for chest pain  Qty: 30 tablet, Refills: 2    Associated Diagnoses: NSTEMI (non-ST elevated myocardial infarction) ([facility])      NovoLOG 100 UNIT/ML injection To be used in insulin pump approximately 120 units a day  Qty: 110 mL, Refills: 1    Associated Diagnoses: Type 1 diabetes mellitus without complication ([facility])      PATIENT MAINTAINED INSULIN PUMP Inject 1 each under the skin every 8 (eight) hours      spironolactone  (ALDACTONE) 25 mg tablet Take 1 tablet (25 mg total) by mouth daily  Qty: 30 tablet, Refills: 11    Associated Diagnoses: Acute on chronic diastolic (congestive) heart failure (HCC)           No discharge procedures on file.    PDMP Review         Value Time User    PDMP Reviewed  Yes 6/10/2024 11:52 AM Vicky Recinos DO             ED Provider  Attending physically available and evaluated Naina Jamison. I managed the patient along with the ED Attending.    Electronically Signed by           Kavon Wong MD  06/16/24 [id number]

## 2024-06-17 LAB
ANION GAP SERPL CALCULATED.3IONS-SCNC: 11 MMOL/L (ref 4–13)
BUN SERPL-MCNC: 13 MG/DL (ref 5–25)
CALCIUM SERPL-MCNC: 8.2 MG/DL (ref 8.4–10.2)
CHLORIDE SERPL-SCNC: 97 MMOL/L (ref 96–108)
CHOLEST SERPL-MCNC: 140 MG/DL
CO2 SERPL-SCNC: 23 MMOL/L (ref 21–32)
CREAT SERPL-MCNC: 0.77 MG/DL (ref 0.6–1.3)
EST. AVERAGE GLUCOSE BLD GHB EST-MCNC: 217 MG/DL
GFR SERPL CREATININE-BSD FRML MDRD: 92 ML/MIN/1.73SQ M
GLUCOSE SERPL-MCNC: 117 MG/DL (ref 65–140)
GLUCOSE SERPL-MCNC: 124 MG/DL (ref 65–140)
GLUCOSE SERPL-MCNC: 192 MG/DL (ref 65–140)
GLUCOSE SERPL-MCNC: 234 MG/DL (ref 65–140)
GLUCOSE SERPL-MCNC: 275 MG/DL (ref 65–140)
GLUCOSE SERPL-MCNC: 335 MG/DL (ref 65–140)
HBA1C MFR BLD: 9.2 %
HCG SERPL QL: NEGATIVE
HDLC SERPL-MCNC: 51 MG/DL
LDLC SERPL CALC-MCNC: 59 MG/DL (ref 0–100)
POTASSIUM SERPL-SCNC: 3.7 MMOL/L (ref 3.5–5.3)
SODIUM SERPL-SCNC: 131 MMOL/L (ref 135–147)
TRIGL SERPL-MCNC: 149 MG/DL

## 2024-06-17 PROCEDURE — 83036 HEMOGLOBIN GLYCOSYLATED A1C: CPT | Performed by: PHYSICIAN ASSISTANT

## 2024-06-17 PROCEDURE — 80048 BASIC METABOLIC PNL TOTAL CA: CPT | Performed by: PHYSICIAN ASSISTANT

## 2024-06-17 PROCEDURE — 84703 CHORIONIC GONADOTROPIN ASSAY: CPT | Performed by: STUDENT IN AN ORGANIZED HEALTH CARE EDUCATION/TRAINING PROGRAM

## 2024-06-17 PROCEDURE — C1769 GUIDE WIRE: HCPCS | Performed by: INTERNAL MEDICINE

## 2024-06-17 PROCEDURE — C1894 INTRO/SHEATH, NON-LASER: HCPCS | Performed by: INTERNAL MEDICINE

## 2024-06-17 PROCEDURE — B2111ZZ FLUOROSCOPY OF MULTIPLE CORONARY ARTERIES USING LOW OSMOLAR CONTRAST: ICD-10-PCS | Performed by: INTERNAL MEDICINE

## 2024-06-17 PROCEDURE — 99153 MOD SED SAME PHYS/QHP EA: CPT | Performed by: INTERNAL MEDICINE

## 2024-06-17 PROCEDURE — 93458 L HRT ARTERY/VENTRICLE ANGIO: CPT | Performed by: INTERNAL MEDICINE

## 2024-06-17 PROCEDURE — 4A023N7 MEASUREMENT OF CARDIAC SAMPLING AND PRESSURE, LEFT HEART, PERCUTANEOUS APPROACH: ICD-10-PCS | Performed by: INTERNAL MEDICINE

## 2024-06-17 PROCEDURE — 99232 SBSQ HOSP IP/OBS MODERATE 35: CPT | Performed by: PHYSICIAN ASSISTANT

## 2024-06-17 PROCEDURE — 99152 MOD SED SAME PHYS/QHP 5/>YRS: CPT | Performed by: INTERNAL MEDICINE

## 2024-06-17 PROCEDURE — 82948 REAGENT STRIP/BLOOD GLUCOSE: CPT

## 2024-06-17 PROCEDURE — 80061 LIPID PANEL: CPT | Performed by: STUDENT IN AN ORGANIZED HEALTH CARE EDUCATION/TRAINING PROGRAM

## 2024-06-17 PROCEDURE — 99232 SBSQ HOSP IP/OBS MODERATE 35: CPT | Performed by: INTERNAL MEDICINE

## 2024-06-17 RX ORDER — SODIUM CHLORIDE 9 MG/ML
75 INJECTION, SOLUTION INTRAVENOUS CONTINUOUS
Status: CANCELLED | OUTPATIENT
Start: 2024-06-17 | End: 2024-06-17

## 2024-06-17 RX ORDER — FENTANYL CITRATE 50 UG/ML
INJECTION, SOLUTION INTRAMUSCULAR; INTRAVENOUS CODE/TRAUMA/SEDATION MEDICATION
Status: DISCONTINUED | OUTPATIENT
Start: 2024-06-17 | End: 2024-06-17 | Stop reason: HOSPADM

## 2024-06-17 RX ORDER — ASPIRIN 81 MG/1
324 TABLET, CHEWABLE ORAL ONCE
Status: CANCELLED | OUTPATIENT
Start: 2024-06-17 | End: 2024-06-17

## 2024-06-17 RX ORDER — NITROGLYCERIN 20 MG/100ML
INJECTION INTRAVENOUS CODE/TRAUMA/SEDATION MEDICATION
Status: DISCONTINUED | OUTPATIENT
Start: 2024-06-17 | End: 2024-06-17 | Stop reason: HOSPADM

## 2024-06-17 RX ORDER — HEPARIN SODIUM 1000 [USP'U]/ML
INJECTION, SOLUTION INTRAVENOUS; SUBCUTANEOUS CODE/TRAUMA/SEDATION MEDICATION
Status: DISCONTINUED | OUTPATIENT
Start: 2024-06-17 | End: 2024-06-17 | Stop reason: HOSPADM

## 2024-06-17 RX ORDER — SODIUM CHLORIDE 9 MG/ML
50 INJECTION, SOLUTION INTRAVENOUS CONTINUOUS
Status: DISPENSED | OUTPATIENT
Start: 2024-06-17 | End: 2024-06-17

## 2024-06-17 RX ORDER — VERAPAMIL HYDROCHLORIDE 2.5 MG/ML
INJECTION, SOLUTION INTRAVENOUS CODE/TRAUMA/SEDATION MEDICATION
Status: DISCONTINUED | OUTPATIENT
Start: 2024-06-17 | End: 2024-06-17 | Stop reason: HOSPADM

## 2024-06-17 RX ORDER — MIDAZOLAM HYDROCHLORIDE 2 MG/2ML
INJECTION, SOLUTION INTRAMUSCULAR; INTRAVENOUS CODE/TRAUMA/SEDATION MEDICATION
Status: DISCONTINUED | OUTPATIENT
Start: 2024-06-17 | End: 2024-06-17 | Stop reason: HOSPADM

## 2024-06-17 RX ADMIN — SODIUM CHLORIDE 50 ML/HR: 0.9 INJECTION, SOLUTION INTRAVENOUS at 15:15

## 2024-06-17 RX ADMIN — HEPARIN SODIUM 7500 UNITS: 5000 INJECTION INTRAVENOUS; SUBCUTANEOUS at 21:52

## 2024-06-17 RX ADMIN — FUROSEMIDE 60 MG: 10 INJECTION, SOLUTION INTRAMUSCULAR; INTRAVENOUS at 05:21

## 2024-06-17 RX ADMIN — ASPIRIN 81 MG: 81 TABLET, COATED ORAL at 09:26

## 2024-06-17 RX ADMIN — Medication 2000 UNITS: at 09:26

## 2024-06-17 RX ADMIN — LISINOPRIL 10 MG: 10 TABLET ORAL at 09:26

## 2024-06-17 RX ADMIN — ALPRAZOLAM 1 MG: 0.5 TABLET ORAL at 19:39

## 2024-06-17 RX ADMIN — CLOPIDOGREL BISULFATE 75 MG: 75 TABLET ORAL at 09:26

## 2024-06-17 RX ADMIN — HEPARIN SODIUM 7500 UNITS: 5000 INJECTION INTRAVENOUS; SUBCUTANEOUS at 05:21

## 2024-06-17 RX ADMIN — FUROSEMIDE 60 MG: 10 INJECTION, SOLUTION INTRAMUSCULAR; INTRAVENOUS at 12:46

## 2024-06-17 RX ADMIN — ATORVASTATIN CALCIUM 40 MG: 40 TABLET, FILM COATED ORAL at 17:15

## 2024-06-17 RX ADMIN — SPIRONOLACTONE 25 MG: 25 TABLET ORAL at 09:26

## 2024-06-17 NOTE — PLAN OF CARE
Problem: PAIN - ADULT  Goal: Verbalizes/displays adequate comfort level or baseline comfort level  Description: Interventions:  - Encourage patient to monitor pain and request assistance  - Assess pain using appropriate pain scale  - Administer analgesics based on type and severity of pain and evaluate response  - Implement non-pharmacological measures as appropriate and evaluate response  - Consider cultural and social influences on pain and pain management  - Notify physician/advanced practitioner if interventions unsuccessful or patient reports new pain  Outcome: Progressing     Problem: INFECTION - ADULT  Goal: Absence or prevention of progression during hospitalization  Description: INTERVENTIONS:  - Assess and monitor for signs and symptoms of infection  - Monitor lab/diagnostic results  - Monitor all insertion sites, i.e. indwelling lines, tubes, and drains  - Monitor endotracheal if appropriate and nasal secretions for changes in amount and color  - [place] appropriate cooling/warming therapies per order  - Administer medications as ordered  - Instruct and encourage patient and family to use good hand hygiene technique  - Identify and instruct in appropriate isolation precautions for identified infection/condition  Outcome: Progressing  Goal: Absence of fever/infection during neutropenic period  Description: INTERVENTIONS:  - Monitor WBC    Outcome: Progressing     Problem: SAFETY ADULT  Goal: Patient will remain free of falls  Description: INTERVENTIONS:  - Educate patient/family on patient safety including physical limitations  - Instruct patient to call for assistance with activity   - Consult OT/PT to assist with strengthening/mobility   - Keep Call bell within reach  - Keep bed low and locked with side rails adjusted as appropriate  - Keep care items and personal belongings within reach  - Initiate and maintain comfort rounds  - Make Fall Risk Sign visible to staff  - Offer Toileting every  Hours,  in advance of need  - Initiate/Maintain alarm  - Obtain necessary fall risk management equipment:   - Apply yellow socks and bracelet for high fall risk patients  - Consider moving patient to room near nurses station  Outcome: Progressing  Goal: Maintain or return to baseline ADL function  Description: INTERVENTIONS:  -  Assess patient's ability to carry out ADLs; assess patient's baseline for ADL function and identify physical deficits which impact ability to perform ADLs (bathing, care of mouth/teeth, toileting, grooming, dressing, etc.)  - Assess/evaluate cause of self-care deficits   - Assess range of motion  - Assess patient's mobility; develop plan if impaired  - Assess patient's need for assistive devices and provide as appropriate  - Encourage maximum independence but intervene and supervise when necessary  - Involve family in performance of ADLs  - Assess for home care needs following discharge   - Consider OT consult to assist with ADL evaluation and planning for discharge  - Provide patient education as appropriate  Outcome: Progressing  Goal: Maintains/Returns to pre admission functional level  Description: INTERVENTIONS:  - Perform AM-PAC 6 Click Basic Mobility/ Daily Activity assessment daily.  - Set and communicate daily mobility goal to care team and patient/family/caregiver.   - Collaborate with rehabilitation services on mobility goals if consulted  - Perform Range of Motion  times a day.  - Reposition patient every  hours.  - Dangle patient  times a day  - Stand patient times a day  - Ambulate patient  times a day  - Out of bed to chair  times a day   - Out of bed for meals times a day  - Out of bed for toileting  - Record patient progress and toleration of activity level   Outcome: Progressing     Problem: DISCHARGE PLANNING  Goal: Discharge to home or other facility with appropriate resources  Description: INTERVENTIONS:  - Identify barriers to discharge w/patient and caregiver  - Arrange for  needed discharge resources and transportation as appropriate  - Identify discharge learning needs (meds, wound care, etc.)  - Arrange for interpretive services to assist at discharge as needed  - Refer to Case Management Department for coordinating discharge planning if the patient needs post-hospital services based on physician/advanced practitioner order or complex needs related to functional status, cognitive ability, or social support system  Outcome: Progressing     Problem: Knowledge Deficit  Goal: Patient/family/caregiver demonstrates understanding of disease process, treatment plan, medications, and discharge instructions  Description: Complete learning assessment and assess knowledge base.  Interventions:  - Provide teaching at level of understanding  - Provide teaching via preferred learning methods  Outcome: Progressing     Problem: CARDIOVASCULAR - ADULT  Goal: Maintains optimal cardiac output and hemodynamic stability  Description: INTERVENTIONS:  - Monitor I/O, vital signs and rhythm  - Monitor for S/S and trends of decreased cardiac output  - Administer and titrate ordered vasoactive medications to optimize hemodynamic stability  - Assess quality of pulses, skin color and temperature  - Assess for signs of decreased coronary artery perfusion  - Instruct patient to report change in severity of symptoms  Outcome: Progressing  Goal: Absence of cardiac dysrhythmias or at baseline rhythm  Description: INTERVENTIONS:  - Continuous cardiac monitoring, vital signs, obtain 12 lead EKG if ordered  - Administer antiarrhythmic and heart rate control medications as ordered  - Monitor electrolytes and administer replacement therapy as ordered  Outcome: Progressing     Problem: RESPIRATORY - ADULT  Goal: Achieves optimal ventilation and oxygenation  Description: INTERVENTIONS:  - Assess for changes in respiratory status  - Assess for changes in mentation and behavior  - Position to facilitate oxygenation and  minimize respiratory effort  - Oxygen administered by appropriate delivery if ordered  - Initiate smoking cessation education as indicated  - Encourage broncho-pulmonary hygiene including cough, deep breathe, Incentive Spirometry  - Assess the need for suctioning and aspirate as needed  - Assess and instruct to report SOB or any respiratory difficulty  - Respiratory Therapy support as indicated  Outcome: Progressing     Problem: METABOLIC, FLUID AND ELECTROLYTES - ADULT  Goal: Electrolytes maintained within normal limits  Description: INTERVENTIONS:  - Monitor labs and assess patient for signs and symptoms of electrolyte imbalances  - Administer electrolyte replacement as ordered  - Monitor response to electrolyte replacements, including repeat lab results as appropriate  - Instruct patient on fluid and nutrition as appropriate  Outcome: Progressing  Goal: Fluid balance maintained  Description: INTERVENTIONS:  - Monitor labs   - Monitor I/O and WT  - Instruct patient on fluid and nutrition as appropriate  - Assess for signs & symptoms of volume excess or deficit  Outcome: Progressing  Goal: Glucose maintained within target range  Description: INTERVENTIONS:  - Monitor Blood Glucose as ordered  - Assess for signs and symptoms of hyperglycemia and hypoglycemia  - Administer ordered medications to maintain glucose within target range  - Assess nutritional intake and initiate nutrition service referral as needed  Outcome: Progressing

## 2024-06-17 NOTE — PROGRESS NOTES
Cardiology Progress note.   Unit/Bed#: -01 Encounter: [id number]        Naina Jamison 49 y.o. 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  She failed outpatient IV diuresis (24) so presented for worsening symptoms.  Was also complaining of angina with recent ER visit on , some episodes of pain at rest.  Decision was made for left heart cath this week after diuresis.      # Acute on chronic HFpEF on presentation-reason for admission  # Angina-some symptoms even at rest, recent ER visit noted 24  # History of CAD post KSENIA to mid LAD for NSTEMI in 2023  # History of poorly controlled diabetes A1c 9.2  # Hypertension  # Hyperlipidemia  # Bicuspid aortic valve, moderate AS  # Preserved EF 60%      Plan:    Still some volume overload although has significant improvement and able to lay flat.  Will plan for left heart cath today.  C/w IV Lasix 60 mg 3 times daily.  On DAPT with aspirin and Plavix.  C/w lisinopril and spironolactone for hypertension.  Follow-up on lipid panel.  Educated on aggressive risk factor modification.  Rest of care per primary team.          Subjective     Patient was seen and examined today.  She is now able to lay flat for over an hour without any shortness of breath.  No significant events on telemetry.  Objective     Vitals: Temp (24hrs), Av.5 °F (36.9 °C), Min:98.2 °F (36.8 °C), Max:98.9 °F (37.2 °C)  Current:  Temperature: 98.9 °F (37.2 °C)  Patient Vitals for the past 24 hrs:   BP Temp Temp src Pulse Resp SpO2 Weight   06/17/24 0926 146/75 -- -- -- -- -- --   06/17/24 0719 140/71 98.9 °F (37.2 °C) Oral 85 16 94 % --   06/17/24 0552 -- -- -- -- -- -- 112 kg (248 lb)   06/17/24 0302 113/76 98.2 °F (36.8 °C) Oral 83 16 (!) 89 % --   06/16/24 2150 121/72 98.2 °F (36.8 °C) -- 82 -- 95 % --   06/16/24 1856 143/84 -- -- 89 16 95 % --   06/16/24 1505 130/67 98.7 °F (37.1 °C) Oral 88 16 97 % --   06/16/24 1048 127/71 -- -- 95 -- 95 % --    Body mass index is 42.57 kg/m².  Physical Exam:  Physical Exam  Vitals reviewed.   Constitutional:       General: She is not in acute distress.     Appearance: She is well-developed. She is not diaphoretic.   Cardiovascular:      Rate and Rhythm: Normal rate and regular rhythm.      Heart sounds: Normal heart sounds. No murmur heard.     No friction rub.   Pulmonary:      Effort: Pulmonary effort is normal. No respiratory distress.      Breath sounds: Normal breath sounds. No stridor.   Abdominal:      General: Bowel sounds are normal. There is no distension.      Palpations: Abdomen is soft.      Tenderness: There is no abdominal tenderness. There is no rebound.   Psychiatric:         Mood and Affect: Mood and affect normal.         Behavior: Behavior normal.         Thought Content: Thought content normal.         Invasive Devices       Peripheral Intravenous Line  Duration             Peripheral IV 06/14/24 Right Antecubital 2 days                        Labs:   Results from last 7 days   Lab Units 06/15/24  0517 06/14/24  1148 06/10/24  1059   WBC Thousand/uL 11.20* 12.64* 9.26   HEMOGLOBIN g/dL 13.4 13.6 14.9   HEMATOCRIT % 40.0 40.0 44.2   PLATELETS Thousands/uL 249 245 282   SEGS PCT %  --  72 63   MONO PCT %  --  6 8   EOS PCT %  --  3 4      Results from last 7 days   Lab Units 06/17/24  0450 06/16/24  0509 06/15/24  0517 06/14/24  1148 06/10/24  1059   SODIUM mmol/L 131* 134* 136 135  "138   POTASSIUM mmol/L 3.7 4.5 3.6 4.3 3.8   CHLORIDE mmol/L 97 101 100 101 99   CO2 mmol/L 23 17* 25 28 30   BUN mg/dL 13 12 14 15 14   CREATININE mg/dL 0.77 0.77 0.81 0.83 0.96   CALCIUM mg/dL 8.2* 7.8* 8.0* 8.7 9.8   ALK PHOS U/L  --   --   --  81  --    ALT U/L  --   --   --  35  --    AST U/L  --   --   --  21  --    MAGNESIUM mg/dL  --   --  2.4 1.6*  --    PHOSPHORUS mg/dL  --   --   --  2.2*  --    EGFR ml/min/1.73sq m 92 92 86 84 71                 No results found for: \"PHART\", \"XMC5SCT\", \"PO2ART\", \"RIO1CAK\", \"F5VEVQBZ\", \"BEART\", \"SOURCE\"  No components found for: \"HIV1X2\"  No results found for: \"HAV\", \"HEPAIGM\", \"HEPBIGM\", \"HEPBCAB\", \"HBEAG\", \"HEPCAB\"  No results found for: \"SPEP\", \"UPEP\"   Lab Results   Component Value Date    HGBA1C 9.2 (H) 06/17/2024    HGBA1C 11.0 (H) 05/08/2024    HGBA1C 9.2 (A) 10/31/2023     No results found for: \"CHOL\"   Lab Results   Component Value Date    HDL 57 07/05/2023      Lab Results   Component Value Date    LDLCALC 33 07/05/2023      Lab Results   Component Value Date    TRIG 73 07/05/2023     No components found for: \"PROCAL\"  Results from last 7 days   Lab Units 06/14/24  1148   BNP pg/mL 19     Micro:      Urinalysis:  No results found for: \"AMPHETUR\", \"BDZUR\", \"COCAINEUR\", \"OPIATEUR\", \"PCPUR\", \"THCUR\", \"ETOH\", \"ACTMNPHEN\", \"SALICYLATE\"   Results from last 7 days   Lab Units 06/14/24  1220   COLOR UA  Light Yellow   CLARITY UA  Clear   SPEC GRAV UA  1.025   PH UA  6.0   LEUKOCYTES UA  Elevated glucose may cause decreased leukocyte values. See urine microscopic for UWBC result*   NITRITE UA  Negative   GLUCOSE UA mg/dl >=1000 (1%)*   KETONES UA mg/dl 20 (1+)*   BILIRUBIN UA  Negative   BLOOD UA  Negative      Results from last 7 days   Lab Units 06/14/24  1220   RBC UA /hpf 1-2   WBC UA /hpf 1-2   EPITHELIAL CELLS WET PREP /hpf Occasional   BACTERIA UA /hpf Occasional      Intake and Outputs:  I/O         06/15 0701 06/16 0700 06/16 0701 06/17 0700 06/17 0701 06/18 " 0700    P.O. 1014 478 0    Total Intake(mL/kg) 1014 (8.8) 478 (4.3) 0 (0)    Urine (mL/kg/hr) 3200 (1.2) 3400 (1.3) 800 (2)    Total Output 3200 3400 800    Net -2186 -2922 -800                 Nutrition:  Diet NPO; Sips with meds  Radiology Results:   XR chest 1 view portable   Final Result by Braden Melo MD (06/14 1407)      No acute cardiopulmonary disease.            Workstation performed: [id number]           Scheduled Medications:  aspirin, 81 mg, Daily  atorvastatin, 40 mg, Daily With Dinner  cholecalciferol, 2,000 Units, Daily  clopidogrel, 75 mg, Daily  DULoxetine, 30 mg, Daily  furosemide, 60 mg, TID (diuretic)  heparin (porcine), 7,500 Units, Q8H MARYA  lisinopril, 10 mg, Daily  patient maintained insulin pump, 1 each, Q8H  spironolactone, 25 mg, Daily      PRN MEDS:  ALPRAZolam, 1 mg, HS PRN  insulin aspart, 1 Units, Daily PRN      Last 24 Hour Meds: :   Medication Administration - last 24 hours from 06/16/2024 1033 to 06/17/2024 1033         Date/Time Order Dose Route Action Action by     06/17/2024 0926 EDT aspirin (ECOTRIN LOW STRENGTH) EC tablet 81 mg 81 mg Oral Given Akiko Abreu     06/16/2024 1735 EDT atorvastatin (LIPITOR) tablet 40 mg 40 mg Oral Given Mihaela Lemos RN     06/17/2024 0926 EDT Cholecalciferol (VITAMIN D3) tablet 2,000 Units 2,000 Units Oral Given Akiko Abreu     06/17/2024 0926 EDT clopidogrel (PLAVIX) tablet 75 mg 75 mg Oral Given Akiko Abreu     06/17/2024 0926 EDT DULoxetine (CYMBALTA) delayed release capsule 30 mg 30 mg Oral Not Given Akiko Abreu     06/17/2024 0926 EDT lisinopril (ZESTRIL) tablet 10 mg 10 mg Oral Given Akiko Abreu     06/17/2024 0926 EDT spironolactone (ALDACTONE) tablet 25 mg 25 mg Oral Given Akiko Abreu     06/17/2024 0521 EDT heparin (porcine) subcutaneous injection 7,500 Units 7,500 Units Subcutaneous Given Yadi Min RN     06/16/2024 2218 EDT heparin (porcine) subcutaneous injection 7,500 Units 7,500 Units Subcutaneous Given Yadi  PIETRO Min     06/16/2024 1328 EDT heparin (porcine) subcutaneous injection 7,500 Units 7,500 Units Subcutaneous Given Mihaela Lemos RN     06/17/2024 0928 EDT PATIENT MAINTAINED INSULIN PUMP 1 each 1 each Subcutaneous Given Akikoarcelia Abreu     06/16/2024 2301 EDT PATIENT MAINTAINED INSULIN PUMP 1 each 1 each Subcutaneous Given Yadi Min RN     06/16/2024 1736 EDT PATIENT MAINTAINED INSULIN PUMP 1 each 1 each Subcutaneous Given by Other Mihaela Lemos RN     06/17/2024 0521 EDT furosemide (LASIX) injection 60 mg 60 mg Intravenous Given Yadi Min RN     06/16/2024 1735 EDT furosemide (LASIX) injection 60 mg 60 mg Intravenous Given Mihaela Lemos RN     06/16/2024 1200 EDT furosemide (LASIX) injection 60 mg 60 mg Intravenous Given Mihaela Lemos RN            PLEASE NOTE:  This encounter was completed utilizing the [employer] Direct Speech Voice Recognition Software. Grammatical errors, random word insertions, pronoun errors and incomplete sentences are occasional consequences of the system due to software limitations, ambient noise and hardware issues.These may be missed by proof reading prior to affixing electronic signature. Any questions or concerns about the content, text or information contained within the body of this dictation should be directly addressed to the physician for clarification. Please do not hesitate to call me directly if you have any any questions or concerns.

## 2024-06-17 NOTE — PROGRESS NOTES
[facility]  Progress Note  Name: Naina Jamison I  MRN: [id number]  Unit/Bed#: -01 I Date of Admission: 6/14/2024   Date of Service: 6/17/2024 I Hospital Day: 3    Assessment & Plan   * Acute on chronic diastolic (congestive) heart failure (HCC)  Assessment & Plan  Wt Readings from Last 3 Encounters:   06/17/24 112 kg (248 lb)   06/13/24 119 kg (263 lb 3.2 oz)   05/22/24 118 kg (260 lb 3.2 oz)   Recent echo in 4//24, preserved EF of 60%  Cardiology following  IV Lasix 60 mg x 1 in ED.  She did take her usual dose of 40 mg daily prior to presentation to the ER.    Lasix increased to 60mg IV q8h 6/16  Tentative c.cath today  Was recently started on Aldactone which we will continue  Monitor input/output, daily weights, fluid restriction  Serial troponin negative      Chest pain  Assessment & Plan  Reports more like chest tightness.  Likely secondary to CHF exacerbation and is improving with diuresis.   History of CAD status post PCI/KSENIA  Serial troponin negative x 2  No acute ischemic changes  Tentative c.cath today    SIRS (systemic inflammatory response syndrome) ([facility])  Assessment & Plan  Meeting SIRS criteria with tachycardia, leukocytosis likely reactive due to CHF exacerbation.   No focal source of infection  Is afebrile  Status post UA, no WBC.  Chest x-ray without infiltrate    Coronary artery disease involving native coronary artery of native heart without angina pectoris  Assessment & Plan  Status post recent PCI/KSENIA to LAD in July/2023  On DAPT with Plavix and aspirin, till July 1, 2024.  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  Patient able and competent to use independently  Fingerstick checks 4 times daily  Blood sugars uncontrolled per last A1c, but patient states she has a new insulin pump system in place since then  For cath today - patient instructed to continue basal insulin. Hold boluses until after procedure. Will start D10 if sugars drop over course of the day    Essential hypertension  Assessment & Plan  Stable    Morbid obesity  Assessment & Plan  Educated on lifestyle/dietary modification    Hyperthyroidism  Assessment & Plan  In euthyroid state  Has been off methimazole since July 2022             VTE Pharmacologic Prophylaxis:   Moderate Risk (Score 3-4) - Pharmacological DVT Prophylaxis Ordered: heparin.    Mobility:   Basic Mobility Inpatient Raw Score: 24  JH-HLM Goal: 8: Walk 250 feet or more  JH-HLM Achieved: 8: Walk 250 feet ot more  JH-HLM Goal achieved. Continue to encourage appropriate mobility.    Patient Centered Rounds: I performed bedside rounds with nursing staff today.   Discussions with Specialists or Other Care Team Provider: case management    Education and Discussions with Family / Patient: Patient declined call to .     Total Time Spent on Date of Encounter in care of patient: 35 mins. This time was spent on one or more of the following: performing physical exam; counseling and coordination of care; obtaining or reviewing history; documenting in the medical record; reviewing/ordering tests, medications or procedures; communicating with other healthcare professionals and discussing with patient's family/caregivers.    Current Length of Stay: 3 day(s)  Current Patient Status: Inpatient   Certification Statement: The patient will continue to require additional inpatient hospital stay due to c.cath  Discharge Plan: Anticipate discharge in 24-48 hrs to home.    Code Status: Level 1 - Full Code    Subjective:   Continues to feel better. Edema improving. Chest  tightness improved.     Objective:     Vitals:   Temp (24hrs), Av.5 °F (36.9 °C), Min:98.2 °F (36.8 °C), Max:98.9 °F (37.2 °C)    Temp:  [98.2 °F (36.8 °C)-98.9 °F (37.2 °C)] 98.9 °F (37.2 °C)  HR:  [82-89] 85  Resp:  [16] 16  BP: (113-146)/(67-84) 146/75  SpO2:  [89 %-97 %] 94 %  Body mass index is 42.57 kg/m².     Input and Output Summary (last 24 hours):     Intake/Output Summary (Last 24 hours) at 2024 1104  Last data filed at 2024 0900  Gross per 24 hour   Intake 478 ml   Output 3400 ml   Net -2922 ml       Physical Exam:   Physical Exam  Vitals and nursing note reviewed.   Constitutional:       General: She is not in acute distress.     Appearance: She is well-developed. She is obese.   HENT:      Head: Normocephalic and atraumatic.   Cardiovascular:      Rate and Rhythm: Normal rate and regular rhythm.      Heart sounds: No murmur heard.     No friction rub.      Comments: Bilateral edema  Pulmonary:      Effort: Pulmonary effort is normal. No respiratory distress.      Breath sounds: Normal breath sounds. No wheezing.   Abdominal:      General: Bowel sounds are normal. There is no distension.      Palpations: Abdomen is soft.      Tenderness: There is no abdominal tenderness. There is no guarding or rebound.   Skin:     General: Skin is warm and dry.      Findings: No rash.   Neurological:      Mental Status: She is alert and oriented to person, place, and time.      Cranial Nerves: No cranial nerve deficit.          Additional Data:     Labs:  Results from last 7 days   Lab Units 06/15/24  0517 24  1148   WBC Thousand/uL 11.20* 12.64*   HEMOGLOBIN g/dL 13.4 13.6   HEMATOCRIT % 40.0 40.0   PLATELETS Thousands/uL 249 245   SEGS PCT %  --  72   LYMPHO PCT %  --  17   MONO PCT %  --  6   EOS PCT %  --  3     Results from last 7 days   Lab Units 24  0450 06/15/24  0517 24  1148   SODIUM mmol/L 131*   < > 135   POTASSIUM mmol/L 3.7   < > 4.3   CHLORIDE mmol/L 97   < > 101   CO2  mmol/L 23   < > 28   BUN mg/dL 13   < > 15   CREATININE mg/dL 0.77   < > 0.83   ANION GAP mmol/L 11   < > 6   CALCIUM mg/dL 8.2*   < > 8.7   ALBUMIN g/dL  --   --  3.7   TOTAL BILIRUBIN mg/dL  --   --  0.36   ALK PHOS U/L  --   --  81   ALT U/L  --   --  35   AST U/L  --   --  21   GLUCOSE RANDOM mg/dL 192*   < > 265*    < > = values in this interval not displayed.         Results from last 7 days   Lab Units 06/17/24  0734 06/16/24  2050 06/16/24  1648 06/16/24  1146 06/16/24  0747 06/15/24  2034 06/15/24  1645 06/15/24  1107 06/15/24  0746 06/14/24  2045 06/14/24  1704 06/14/24  1054   POC GLUCOSE mg/dl 275* 289* 322* 201* 125 193* 182* 219* 142* 151* 147* 322*     Results from last 7 days   Lab Units 06/17/24  0450   HEMOGLOBIN A1C % 9.2*           Lines/Drains:  Invasive Devices       Peripheral Intravenous Line  Duration             Peripheral IV 06/14/24 Right Antecubital 2 days                          Imaging: No pertinent imaging reviewed.    Recent Cultures (last 7 days):         Last 24 Hours Medication List:   Current Facility-Administered Medications   Medication Dose Route Frequency Provider Last Rate    ALPRAZolam  1 mg Oral HS PRN Ira Joyce,       aspirin  81 mg Oral Daily Ira Joyce, DO      atorvastatin  40 mg Oral Daily With Dinner Ira Joyce, DO      cholecalciferol  2,000 Units Oral Daily Ira Joyce, DO      clopidogrel  75 mg Oral Daily Ira Joyce, DO      DULoxetine  30 mg Oral Daily Ira Joyce,       furosemide  60 mg Intravenous TID (diuretic) Julien Glez MD      heparin (porcine)  7,500 Units Subcutaneous Q8H MARYA Ira Joyce DO      insulin aspart  1 Units Subcutaneous Insulin Pump Daily PRN Ira Joyce,       lisinopril  10 mg Oral Daily Ira Joyce DO      patient maintained insulin pump  1 each Subcutaneous Q8H Ira Joyce DO      spironolactone  25 mg  Oral Daily Ira Joyce DO          Today, Patient Was Seen By: Jeni Frank PA-C    **Please Note: This note may have been constructed using a voice recognition system.**

## 2024-06-17 NOTE — PLAN OF CARE
Problem: PAIN - ADULT  Goal: Verbalizes/displays adequate comfort level or baseline comfort level  Description: Interventions:  - Encourage patient to monitor pain and request assistance  - Assess pain using appropriate pain scale  - Administer analgesics based on type and severity of pain and evaluate response  - Implement non-pharmacological measures as appropriate and evaluate response  - Consider cultural and social influences on pain and pain management  - Notify physician/advanced practitioner if interventions unsuccessful or patient reports new pain  Outcome: Progressing     Problem: INFECTION - ADULT  Goal: Absence or prevention of progression during hospitalization  Description: INTERVENTIONS:  - Assess and monitor for signs and symptoms of infection  - Monitor lab/diagnostic results  - Monitor all insertion sites, i.e. indwelling lines, tubes, and drains  - Monitor endotracheal if appropriate and nasal secretions for changes in amount and color  - [place] appropriate cooling/warming therapies per order  - Administer medications as ordered  - Instruct and encourage patient and family to use good hand hygiene technique  - Identify and instruct in appropriate isolation precautions for identified infection/condition  Outcome: Progressing  Goal: Absence of fever/infection during neutropenic period  Description: INTERVENTIONS:  - Monitor WBC    Outcome: Progressing

## 2024-06-17 NOTE — ASSESSMENT & PLAN NOTE
Lab Results   Component Value Date    HGBA1C 9.2 (H) 06/17/2024       Recent Labs     06/16/24  1146 06/16/24  1648 06/16/24 2050 06/17/24  0734   POCGLU 201* 322* 289* 275*         Blood Sugar Average: Last 72 hrs:  (P) 214  Insulin pump in place.  Patient able and competent to use independently  Fingerstick checks 4 times daily  Blood sugars uncontrolled per last A1c, but patient states she has a new insulin pump system in place since then  For cath today - patient instructed to continue basal insulin. Hold boluses until after procedure. Will start D10 if sugars drop over course of the day

## 2024-06-17 NOTE — ASSESSMENT & PLAN NOTE
Wt Readings from Last 3 Encounters:   06/17/24 112 kg (248 lb)   06/13/24 119 kg (263 lb 3.2 oz)   05/22/24 118 kg (260 lb 3.2 oz)   Recent echo in 4//24, preserved EF of 60%  Cardiology following  IV Lasix 60 mg x 1 in ED.  She did take her usual dose of 40 mg daily prior to presentation to the ER.    Lasix increased to 60mg IV q8h 6/16  Tentative c.cath today  Was recently started on Aldactone which we will continue  Monitor input/output, daily weights, fluid restriction  Serial troponin negative

## 2024-06-17 NOTE — DISCHARGE INSTR - AVS FIRST PAGE
1. Please see the post cardiac catheterization dishcarge instructions.   No heavy lifting, greater than 10 lbs. or strenuous  activity for 48 hrs.    2.Remove band aid tomorrow.  Shower and wash area- wrist gently with soap and water- beginning tomorrow. Rinse and pat dry.  Apply new water seal band aid.  Repeat this process for 5 days. No powders, creams lotions or antibiotic ointments  for 5 days.  No tub baths, hot tubs or swimming for 5 days.     3. Please call our office ([phone]) if you have any fever, redness, swelling, discharge from your wrist access site.    4.No driving for 1 day

## 2024-06-17 NOTE — ASSESSMENT & PLAN NOTE
Reports more like chest tightness.  Likely secondary to CHF exacerbation and is improving with diuresis.   History of CAD status post PCI/KSENIA  Serial troponin negative x 2  No acute ischemic changes  Tentative c.cath today

## 2024-06-18 VITALS
DIASTOLIC BLOOD PRESSURE: 62 MMHG | HEIGHT: 64 IN | TEMPERATURE: 98.6 F | HEART RATE: 86 BPM | OXYGEN SATURATION: 96 % | SYSTOLIC BLOOD PRESSURE: 120 MMHG | RESPIRATION RATE: 17 BRPM | BODY MASS INDEX: 42.37 KG/M2 | WEIGHT: 248.2 LBS

## 2024-06-18 PROBLEM — R07.9 CHEST PAIN: Status: RESOLVED | Noted: 2024-06-14 | Resolved: 2024-06-18

## 2024-06-18 PROBLEM — I50.33 ACUTE ON CHRONIC DIASTOLIC (CONGESTIVE) HEART FAILURE (HCC): Status: RESOLVED | Noted: 2024-05-22 | Resolved: 2024-06-18

## 2024-06-18 PROBLEM — I50.32 CHRONIC DIASTOLIC HEART FAILURE (HCC): Status: ACTIVE | Noted: 2024-06-18

## 2024-06-18 LAB
ANION GAP SERPL CALCULATED.3IONS-SCNC: 9 MMOL/L (ref 4–13)
BUN SERPL-MCNC: 14 MG/DL (ref 5–25)
CALCIUM SERPL-MCNC: 8.1 MG/DL (ref 8.4–10.2)
CHLORIDE SERPL-SCNC: 95 MMOL/L (ref 96–108)
CO2 SERPL-SCNC: 27 MMOL/L (ref 21–32)
CREAT SERPL-MCNC: 0.75 MG/DL (ref 0.6–1.3)
GFR SERPL CREATININE-BSD FRML MDRD: 95 ML/MIN/1.73SQ M
GLUCOSE SERPL-MCNC: 237 MG/DL (ref 65–140)
GLUCOSE SERPL-MCNC: 306 MG/DL (ref 65–140)
GLUCOSE SERPL-MCNC: 322 MG/DL (ref 65–140)
POTASSIUM SERPL-SCNC: 3.7 MMOL/L (ref 3.5–5.3)
SODIUM SERPL-SCNC: 131 MMOL/L (ref 135–147)

## 2024-06-18 PROCEDURE — 80048 BASIC METABOLIC PNL TOTAL CA: CPT | Performed by: INTERNAL MEDICINE

## 2024-06-18 PROCEDURE — 99238 HOSP IP/OBS DSCHRG MGMT 30/<: CPT | Performed by: PHYSICIAN ASSISTANT

## 2024-06-18 PROCEDURE — 99232 SBSQ HOSP IP/OBS MODERATE 35: CPT | Performed by: INTERNAL MEDICINE

## 2024-06-18 PROCEDURE — 82948 REAGENT STRIP/BLOOD GLUCOSE: CPT

## 2024-06-18 RX ORDER — TORSEMIDE 20 MG/1
40 TABLET ORAL DAILY
Status: DISCONTINUED | OUTPATIENT
Start: 2024-06-18 | End: 2024-06-18 | Stop reason: HOSPADM

## 2024-06-18 RX ORDER — TORSEMIDE 20 MG/1
40 TABLET ORAL DAILY
Qty: 60 TABLET | Refills: 0 | Status: SHIPPED | OUTPATIENT
Start: 2024-06-18 | End: 2024-07-18

## 2024-06-18 RX ADMIN — CLOPIDOGREL BISULFATE 75 MG: 75 TABLET ORAL at 09:43

## 2024-06-18 RX ADMIN — HEPARIN SODIUM 7500 UNITS: 5000 INJECTION INTRAVENOUS; SUBCUTANEOUS at 05:00

## 2024-06-18 RX ADMIN — Medication 2000 UNITS: at 09:43

## 2024-06-18 RX ADMIN — SPIRONOLACTONE 25 MG: 25 TABLET ORAL at 09:43

## 2024-06-18 RX ADMIN — ASPIRIN 81 MG: 81 TABLET, COATED ORAL at 09:43

## 2024-06-18 RX ADMIN — TORSEMIDE 40 MG: 20 TABLET ORAL at 14:59

## 2024-06-18 RX ADMIN — LISINOPRIL 10 MG: 10 TABLET ORAL at 09:43

## 2024-06-18 RX ADMIN — FUROSEMIDE 60 MG: 10 INJECTION, SOLUTION INTRAMUSCULAR; INTRAVENOUS at 05:00

## 2024-06-18 NOTE — ASSESSMENT & PLAN NOTE
Wt Readings from Last 3 Encounters:   06/18/24 113 kg (248 lb 3.2 oz)   06/13/24 119 kg (263 lb 3.2 oz)   05/22/24 118 kg (260 lb 3.2 oz)   Recent echo in 4//24, preserved EF of 60%  Home dose diuretic: Lasix 80 mg daily  Cardiology following, appreciate ongoing recommendations  Status post diuresis with IV Lasix 60 mg 3 times daily  Transition to torsemide 40 mg daily on discharge  Cardiac cath with stable CAD  Continued on home dose lisinopril and Aldactone  Monitor input/output, daily weights, fluid restriction

## 2024-06-18 NOTE — ASSESSMENT & PLAN NOTE
Patient presenting with chest tightness. History of CAD status post PCI/KSENIA.  Likely secondary to CHF exacerbation and is improving with diuresis  Serial troponin negative and EKG without acute ischemic changes  Cardiac cath 6/17 with stable CAD  Continue with DAPT, statin

## 2024-06-18 NOTE — PROGRESS NOTES
Cardiology Progress note.   Unit/Bed#: -01 Encounter: [id number]        Naina Jamison 49 y.o. 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  She failed outpatient IV diuresis (24) so presented for worsening symptoms.  Was also complaining of angina with recent ER visit on , some episodes of pain at rest.  Decision was made for left heart cath this week after diuresis.      # Acute on chronic HFpEF on presentation-reason for admission  # Angina-some symptoms even at rest, recent ER visit noted 24   # Stable CAD and LAD stent on [facility] from 24.  # History of CAD post KSENIA to mid LAD for NSTEMI in 2023  # History of poorly controlled diabetes A1c 9.2  # Hypertension  # Hyperlipidemia  # Bicuspid aortic valve, moderate AS  # Preserved EF 60%      Plan:    Euvolemic on exam. Will discuss of switching to PO torsemide or Lasix.   On DAPT with aspirin and Plavix. Stable CAD and stents on [facility] from .   C/w lisinopril and spironolactone for hypertension.  Educated on aggressive risk factor modification.  Will discuss potential discharge today vs monitor one more day. Will schedule outpatient HF follow up.   Rest of care per primary team.          Subjective     Patient was seen and examined today. She is able to lay flat w/o any symptoms. No major events on tele.   Objective     Vitals: Temp (24hrs), Av.2 °F (36.8 °C), Min:97.9 °F (36.6 °C),  Max:98.4 °F (36.9 °C)  Current: Temperature: 98.3 °F (36.8 °C)  Patient Vitals for the past 24 hrs:   BP Temp Temp src Pulse Resp SpO2 Weight   06/18/24 0722 124/63 98.3 °F (36.8 °C) Oral 80 18 95 % --   06/18/24 0556 -- -- -- -- -- -- 113 kg (248 lb 3.2 oz)   06/18/24 0249 105/54 97.9 °F (36.6 °C) -- 74 16 93 % --   06/17/24 1830 110/60 -- -- 86 20 92 % --   06/17/24 1700 101/65 -- -- 97 -- 95 % --   06/17/24 1632 120/66 -- -- 91 -- 93 % --   06/17/24 1545 -- -- -- -- -- 96 % --   06/17/24 1515 134/72 -- -- 87 -- 95 % --   06/17/24 1502 147/69 98.4 °F (36.9 °C) -- 88 -- 93 % --   06/17/24 1500 -- -- -- 90 -- 94 % --   06/17/24 1100 -- -- -- -- -- 95 % --    Body mass index is 42.6 kg/m².  Physical Exam:  Physical Exam  Vitals reviewed.   Constitutional:       General: She is not in acute distress.     Appearance: She is well-developed. She is not diaphoretic.   Cardiovascular:      Rate and Rhythm: Normal rate and regular rhythm.      Heart sounds: Normal heart sounds. No murmur heard.     No friction rub.   Pulmonary:      Effort: Pulmonary effort is normal. No respiratory distress.      Breath sounds: Normal breath sounds. No stridor.   Abdominal:      General: Bowel sounds are normal. There is no distension.      Palpations: Abdomen is soft.      Tenderness: There is no abdominal tenderness. There is no rebound.   Psychiatric:         Behavior: Behavior normal.         Thought Content: Thought content normal.         Invasive Devices       Peripheral Intravenous Line  Duration             Peripheral IV 06/18/24 Dorsal (posterior);Left Hand <1 day                        Labs:   Results from last 7 days   Lab Units 06/15/24  0517 06/14/24  1148   WBC Thousand/uL 11.20* 12.64*   HEMOGLOBIN g/dL 13.4 13.6   HEMATOCRIT % 40.0 40.0   PLATELETS Thousands/uL 249 245   SEGS PCT %  --  72   MONO PCT %  --  6   EOS PCT %  --  3      Results from last 7 days   Lab Units 06/18/24  0454 06/17/24  0450 06/16/24  0509  "06/15/24  0517 06/14/24  1148   SODIUM mmol/L 131* 131* 134* 136 135   POTASSIUM mmol/L 3.7 3.7 4.5 3.6 4.3   CHLORIDE mmol/L 95* 97 101 100 101   CO2 mmol/L 27 23 17* 25 28   BUN mg/dL 14 13 12 14 15   CREATININE mg/dL 0.75 0.77 0.77 0.81 0.83   CALCIUM mg/dL 8.1* 8.2* 7.8* 8.0* 8.7   ALK PHOS U/L  --   --   --   --  81   ALT U/L  --   --   --   --  35   AST U/L  --   --   --   --  21   MAGNESIUM mg/dL  --   --   --  2.4 1.6*   PHOSPHORUS mg/dL  --   --   --   --  2.2*   EGFR ml/min/1.73sq m 95 92 92 86 84                 No results found for: \"PHART\", \"YDE3BOF\", \"PO2ART\", \"ZWR3TOK\", \"F3SJEGIZ\", \"BEART\", \"SOURCE\"  No components found for: \"HIV1X2\"  No results found for: \"HAV\", \"HEPAIGM\", \"HEPBIGM\", \"HEPBCAB\", \"HBEAG\", \"HEPCAB\"  No results found for: \"SPEP\", \"UPEP\"   Lab Results   Component Value Date    HGBA1C 9.2 (H) 06/17/2024    HGBA1C 11.0 (H) 05/08/2024    HGBA1C 9.2 (A) 10/31/2023     No results found for: \"CHOL\"   Lab Results   Component Value Date    HDL 51 06/17/2024    HDL 57 07/05/2023      Lab Results   Component Value Date    LDLCALC 59 06/17/2024    LDLCALC 33 07/05/2023      Lab Results   Component Value Date    TRIG 149 06/17/2024    TRIG 73 07/05/2023     No components found for: \"PROCAL\"  Results from last 7 days   Lab Units 06/14/24  1148   BNP pg/mL 19     Micro:      Urinalysis:  No results found for: \"AMPHETUR\", \"BDZUR\", \"COCAINEUR\", \"OPIATEUR\", \"PCPUR\", \"THCUR\", \"ETOH\", \"ACTMNPHEN\", \"SALICYLATE\"   Results from last 7 days   Lab Units 06/14/24  1220   COLOR UA  Light Yellow   CLARITY UA  Clear   SPEC GRAV UA  1.025   PH UA  6.0   LEUKOCYTES UA  Elevated glucose may cause decreased leukocyte values. See urine microscopic for UWBC result*   NITRITE UA  Negative   GLUCOSE UA mg/dl >=1000 (1%)*   KETONES UA mg/dl 20 (1+)*   BILIRUBIN UA  Negative   BLOOD UA  Negative      Results from last 7 days   Lab Units 06/14/24  1220   RBC UA /hpf 1-2   WBC UA /hpf 1-2   EPITHELIAL CELLS WET PREP /hpf " Occasional   BACTERIA UA /hpf Occasional      Intake and Outputs:  I/O         06/15 0701  06/16 0700 06/16 0701  06/17 0700 06/17 0701  06/18 0700    P.O. 1014 478 0    Total Intake(mL/kg) 1014 (8.8) 478 (4.3) 0 (0)    Urine (mL/kg/hr) 3200 (1.2) 3400 (1.3) 800 (2)    Total Output 3200 3400 800    Net -7786 -2315 -608                 Nutrition:  Diet Cardiovascular; Cardiac; Consistent Carbohydrate Diet Level 2 (5 carb servings/75 grams CHO/meal)  Radiology Results:   XR chest 1 view portable   Final Result by Braden Melo MD (06/14 4207)      No acute cardiopulmonary disease.            Workstation performed: [id number]           Scheduled Medications:  aspirin, 81 mg, Daily  atorvastatin, 40 mg, Daily With Dinner  cholecalciferol, 2,000 Units, Daily  clopidogrel, 75 mg, Daily  DULoxetine, 30 mg, Daily  furosemide, 60 mg, TID (diuretic)  heparin (porcine), 7,500 Units, Q8H MARYA  lisinopril, 10 mg, Daily  patient maintained insulin pump, 1 each, Q8H  spironolactone, 25 mg, Daily      PRN MEDS:  ALPRAZolam, 1 mg, HS PRN  insulin aspart, 1 Units, Daily PRN      Last 24 Hour Meds: :   Medication Administration - last 24 hours from 06/17/2024 0939 to 06/18/2024 0939         Date/Time Order Dose Route Action Action by     06/17/2024 1939 EDT ALPRAZolam (XANAX) tablet 1 mg 1 mg Oral Given Yadi Min RN     06/17/2024 1500 EDT ALPRAZolam (XANAX) tablet 1 mg -- Oral MAR Unhold Akiko Abreu     06/17/2024 1313 EDT ALPRAZolam (XANAX) tablet 1 mg -- Oral MAR Hold Automatic Transfer Provider     06/17/2024 1500 EDT aspirin (ECOTRIN LOW STRENGTH) EC tablet 81 mg -- Oral MAR Unhold Akiko Abreu     06/17/2024 1313 EDT aspirin (ECOTRIN LOW STRENGTH) EC tablet 81 mg -- Oral MAR Hold Automatic Transfer Provider     06/17/2024 1715 EDT atorvastatin (LIPITOR) tablet 40 mg 40 mg Oral Given Akiko Abreu     06/17/2024 1500 EDT atorvastatin (LIPITOR) tablet 40 mg -- Oral MAR Unhold Akiko Abreu     06/17/2024 1313 EDT  atorvastatin (LIPITOR) tablet 40 mg -- Oral MAR Hold Automatic Transfer Provider     06/17/2024 1500 EDT Cholecalciferol (VITAMIN D3) tablet 2,000 Units -- Oral MAR Unhold Akiko Abreu     06/17/2024 1313 EDT Cholecalciferol (VITAMIN D3) tablet 2,000 Units -- Oral MAR Hold Automatic Transfer Provider     06/17/2024 1500 EDT clopidogrel (PLAVIX) tablet 75 mg -- Oral MAR Unhold Akiko Abreu     06/17/2024 1313 EDT clopidogrel (PLAVIX) tablet 75 mg -- Oral MAR Hold Automatic Transfer Provider     06/17/2024 1500 EDT DULoxetine (CYMBALTA) delayed release capsule 30 mg -- Oral MAR Unhold Akiko Abreu     06/17/2024 1313 EDT DULoxetine (CYMBALTA) delayed release capsule 30 mg -- Oral MAR Hold Automatic Transfer Provider     06/17/2024 1500 EDT lisinopril (ZESTRIL) tablet 10 mg -- Oral MAR Unhold Akiko Abreu     06/17/2024 1313 EDT lisinopril (ZESTRIL) tablet 10 mg -- Oral MAR Hold Automatic Transfer Provider     06/17/2024 1500 EDT spironolactone (ALDACTONE) tablet 25 mg -- Oral MAR Unhold Akiko Abreu     06/17/2024 1313 EDT spironolactone (ALDACTONE) tablet 25 mg -- Oral MAR Hold Automatic Transfer Provider     06/18/2024 0500 EDT heparin (porcine) subcutaneous injection 7,500 Units 7,500 Units Subcutaneous Given Yadi Min RN     06/17/2024 2152 EDT heparin (porcine) subcutaneous injection 7,500 Units 7,500 Units Subcutaneous Given Yadi Min RN     06/17/2024 1500 EDT heparin (porcine) subcutaneous injection 7,500 Units -- Subcutaneous MAR Unhold Akiko Abreu     06/17/2024 1400 EDT heparin (porcine) subcutaneous injection 7,500 Units -- Subcutaneous Dose Auto Held Automatic Transfer Provider     06/17/2024 1313 EDT heparin (porcine) subcutaneous injection 7,500 Units -- Subcutaneous MAR Hold Automatic Transfer Provider     06/17/2024 1500 EDT insulin aspart (NovoLOG) FOR PUMP REFILLS 1 Units -- Subcutaneous Insulin Pump MAR Unhold Akiko Abreu     06/17/2024 1313 EDT insulin aspart (NovoLOG) FOR PUMP REFILLS 1  Units -- Subcutaneous Insulin Pump MAR Hold Automatic Transfer Provider     06/17/2024 2309 EDT PATIENT MAINTAINED INSULIN PUMP 1 each 1 each Subcutaneous Given Yadi Min RN     06/17/2024 1716 EDT PATIENT MAINTAINED INSULIN PUMP 1 each 1 each Subcutaneous Given Akiko Abreu     06/17/2024 1500 EDT PATIENT MAINTAINED INSULIN PUMP 1 each -- Subcutaneous MAR Unhold Akiko Abreu     06/17/2024 1313 EDT PATIENT MAINTAINED INSULIN PUMP 1 each -- Subcutaneous MAR Hold Automatic Transfer Provider     06/18/2024 0500 EDT furosemide (LASIX) injection 60 mg 60 mg Intravenous Given Yadi Min RN     06/17/2024 1714 EDT furosemide (LASIX) injection 60 mg 0 mg Intravenous Hold Akiko Abreu     06/17/2024 1500 EDT furosemide (LASIX) injection 60 mg -- Intravenous MAR Unhold Akiko Abreu     06/17/2024 1313 EDT furosemide (LASIX) injection 60 mg -- Intravenous MAR Hold Automatic Transfer Provider     06/17/2024 1246 EDT furosemide (LASIX) injection 60 mg 60 mg Intravenous Given Akiko Abreu     06/17/2024 1515 EDT sodium chloride 0.9 % infusion 50 mL/hr Intravenous New Bag Akiko Abreu            PLEASE NOTE:  This encounter was completed utilizing the [employer] Direct Speech Voice Recognition Software. Grammatical errors, random word insertions, pronoun errors and incomplete sentences are occasional consequences of the system due to software limitations, ambient noise and hardware issues.These may be missed by proof reading prior to affixing electronic signature. Any questions or concerns about the content, text or information contained within the body of this dictation should be directly addressed to the physician for clarification. Please do not hesitate to call me directly if you have any any questions or concerns.

## 2024-06-18 NOTE — ASSESSMENT & PLAN NOTE
Lab Results   Component Value Date    HGBA1C 9.2 (H) 06/17/2024       Recent Labs     06/17/24  1556 06/17/24  1646 06/17/24  2104 06/18/24  0758   POCGLU 124 234* 335* 306*         Blood Sugar Average: Last 72 hrs:  (P) [id number]  Uncontrolled per last A1c, but patient states she has a new insulin pump system in place since then  Insulin pump in place -- patient able and competent to use independently  Fingerstick checks 4 times daily  Hypoglycemia protocol  Carb controlled diet

## 2024-06-18 NOTE — ASSESSMENT & PLAN NOTE
Status post recent PCI/KSENIA to LAD in July/2023  On DAPT with Plavix and aspirin, till July 1, 2024 and thereafter to be on aspirin 81 mg daily  Repeat cardiac cath with stable disease  Serial troponin negative

## 2024-06-18 NOTE — PLAN OF CARE
Problem: PAIN - ADULT  Goal: Verbalizes/displays adequate comfort level or baseline comfort level  Description: Interventions:  - Encourage patient to monitor pain and request assistance  - Assess pain using appropriate pain scale  - Administer analgesics based on type and severity of pain and evaluate response  - Implement non-pharmacological measures as appropriate and evaluate response  - Consider cultural and social influences on pain and pain management  - Notify physician/advanced practitioner if interventions unsuccessful or patient reports new pain  Outcome: Progressing     Problem: INFECTION - ADULT  Goal: Absence or prevention of progression during hospitalization  Description: INTERVENTIONS:  - Assess and monitor for signs and symptoms of infection  - Monitor lab/diagnostic results  - Monitor all insertion sites, i.e. indwelling lines, tubes, and drains  - Monitor endotracheal if appropriate and nasal secretions for changes in amount and color  - [place] appropriate cooling/warming therapies per order  - Administer medications as ordered  - Instruct and encourage patient and family to use good hand hygiene technique  - Identify and instruct in appropriate isolation precautions for identified infection/condition  Outcome: Progressing  Goal: Absence of fever/infection during neutropenic period  Description: INTERVENTIONS:  - Monitor WBC    Outcome: Progressing

## 2024-06-18 NOTE — DISCHARGE SUMMARY
[facility]  Discharge- Naina Jamison 1975, 49 y.o. female MRN: [id number]  Unit/Bed#: [facility]-01 Encounter: [id number]  Primary Care Provider: Antoine Pratt MD   Date and time admitted to hospital: 6/14/2024 11:11 AM    * Acute on chronic diastolic (congestive) heart failure (HCC)-resolved as of 6/18/2024  Assessment & Plan  Wt Readings from Last 3 Encounters:   06/18/24 113 kg (248 lb 3.2 oz)   06/13/24 119 kg (263 lb 3.2 oz)   05/22/24 118 kg (260 lb 3.2 oz)   Recent echo in 4//24, preserved EF of 60%  Home dose diuretic: Lasix 80 mg daily  Cardiology following, appreciate ongoing recommendations  Status post diuresis with IV Lasix 60 mg 3 times daily  Transition to torsemide 40 mg daily on discharge  Cardiac cath with stable CAD  Continued on home dose lisinopril and Aldactone  Monitor input/output, daily weights, fluid restriction      Coronary artery disease involving native coronary artery of native heart without angina pectoris  Assessment & Plan  Status post recent PCI/KSENIA to LAD in July/2023  On DAPT with Plavix and aspirin, till July 1, 2024 and thereafter to be on aspirin 81 mg daily  Repeat cardiac cath with stable disease  Serial troponin negative    Chest pain-resolved as of 6/18/2024  Assessment & Plan  Patient presenting with chest tightness. History of CAD status post PCI/KSENIA.  Likely secondary to CHF exacerbation and is improving with diuresis  Serial troponin negative and EKG without acute ischemic changes  Cardiac cath 6/17 with stable CAD  Continue with DAPT, statin    SIRS (systemic inflammatory response syndrome) (HCC)  Assessment & Plan  Meeting SIRS criteria with tachycardia, leukocytosis likely reactive due to CHF exacerbation.   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 (HCC)  Assessment & Plan  Lab  Results   Component Value Date    HGBA1C 9.2 (H) 06/17/2024       Recent Labs     06/17/24  1556 06/17/24  1646 06/17/24  2104 06/18/24  0758   POCGLU 124 234* 335* 306*         Blood Sugar Average: Last 72 hrs:  (P) [id number]  Uncontrolled per last A1c, but patient states she has a new insulin pump system in place since then  Insulin pump in place -- patient able and competent to use independently  Fingerstick checks 4 times daily  Hypoglycemia protocol  Carb controlled diet    Essential hypertension  Assessment & Plan  Blood pressure well-controlled on review  Continued on home dose lisinopril and Aldactone    Hyperthyroidism  Assessment & Plan  In euthyroid state, has been off methimazole since July 2022    Morbid obesity  Assessment & Plan  Educated on lifestyle/dietary modification      Medical Problems       Resolved Problems  Date Reviewed: 6/18/2024            Resolved    * (Principal) Acute on chronic diastolic (congestive) heart failure (HCC) 6/18/2024     Resolved by  Alexa Joseph PA-C    Chest pain 6/18/2024     Resolved by  Alexa Joseph PA-C        Discharging Physician / Practitioner: Alexa Joseph PA-C  PCP: Antione Pratt MD  Admission Date:   Admission Orders (From admission, onward)       Ordered        06/14/24 1512  INPATIENT ADMISSION  Once            06/14/24 1425  Place in Observation  Once                          Discharge Date: 06/18/24    Consultations During Hospital Stay:  Cardiology     Procedures Performed:   Cardiac cath     Significant Findings / Test Results:   Outlined above     Incidental Findings:   None      Test Results Pending at Discharge (will require follow up):   None      Outpatient Tests Requested:  None     Complications:  none     Reason for Admission: acute on chronic CHF    Hospital Course:   Naina Jamison is a 49 y.o. female patient who originally presented to the hospital on 6/14/2024 due to chest tightness and diffuse edema.  On admission she was  "found to be in acute on chronic heart failure and she was seen in consultation by cardiology.  She underwent diuresis with IV Lasix with subsequent improvement in her symptoms.  Given recent history of KSENIA placement to LAD, she underwent repeat cardiac cath which showed stable CAD.  Her diuretic was switched from Lasix to torsemide 40 mg daily at time of discharge.  She will follow-up with cardiology in the outpatient setting.  No other changes to PTA medication regimen were made during this admission.    Please see above list of diagnoses and related plan for additional information.     Condition at Discharge: stable    Discharge Day Visit / Exam:   Subjective: Patient reports feeling much better today and is eager for discharge home.  Vitals: Blood Pressure: 120/62 (06/18/24 1455)  Pulse: 86 (06/18/24 1455)  Temperature: 98.6 °F (37 °C) (06/18/24 1455)  Temp Source: Oral (06/18/24 1455)  Respirations: 17 (06/18/24 1455)  Height: 5' 4\" (162.6 cm) (06/14/24 1727)  Weight - Scale: 113 kg (248 lb 3.2 oz) (06/18/24 0556)  SpO2: 96 % (06/18/24 1455)  Exam:   Physical Exam  Vitals and nursing note reviewed.   Constitutional:       General: She is not in acute distress.     Appearance: She is obese.   Cardiovascular:      Rate and Rhythm: Normal rate.   Pulmonary:      Effort: Pulmonary effort is normal. No respiratory distress.   Musculoskeletal:      Right lower leg: No edema.      Left lower leg: No edema.   Skin:     General: Skin is warm and dry.      Coloration: Skin is not pale.      Findings: No erythema.   Neurological:      General: No focal deficit present.      Mental Status: She is alert and oriented to person, place, and time. Mental status is at baseline.        Discussion with Family: Patient declined call to .     Discharge instructions/Information to patient and family:   See after visit summary for information provided to patient and family.      Provisions for Follow-Up Care:  See after " visit summary for information related to follow-up care and any pertinent home health orders.      Mobility at time of Discharge:   Basic Mobility Inpatient Raw Score: 24  JH-HLM Goal: 8: Walk 250 feet or more  JH-HLM Achieved: 7: Walk 25 feet or more  HLM Goal NOT achieved. Continue to encourage mobility in post discharge setting.     Disposition:   Home    Planned Readmission: no     Discharge Statement:  I spent 30 minutes discharging the patient. This time was spent on the day of discharge. I had direct contact with the patient on the day of discharge. Greater than 50% of the total time was spent examining patient, answering all patient questions, arranging and discussing plan of care with patient as well as directly providing post-discharge instructions.  Additional time then spent on discharge activities.    Discharge Medications:  See after visit summary for reconciled discharge medications provided to patient and/or family.      **Please Note: This note may have been constructed using a voice recognition system**

## 2024-06-18 NOTE — PLAN OF CARE
Problem: PAIN - ADULT  Goal: Verbalizes/displays adequate comfort level or baseline comfort level  Description: Interventions:  - Encourage patient to monitor pain and request assistance  - Assess pain using appropriate pain scale  - Administer analgesics based on type and severity of pain and evaluate response  - Implement non-pharmacological measures as appropriate and evaluate response  - Consider cultural and social influences on pain and pain management  - Notify physician/advanced practitioner if interventions unsuccessful or patient reports new pain  Outcome: Progressing     Problem: INFECTION - ADULT  Goal: Absence or prevention of progression during hospitalization  Description: INTERVENTIONS:  - Assess and monitor for signs and symptoms of infection  - Monitor lab/diagnostic results  - Monitor all insertion sites, i.e. indwelling lines, tubes, and drains  - Monitor endotracheal if appropriate and nasal secretions for changes in amount and color  - [place] appropriate cooling/warming therapies per order  - Administer medications as ordered  - Instruct and encourage patient and family to use good hand hygiene technique  - Identify and instruct in appropriate isolation precautions for identified infection/condition  Outcome: Progressing  Goal: Absence of fever/infection during neutropenic period  Description: INTERVENTIONS:  - Monitor WBC    Outcome: Progressing     Problem: SAFETY ADULT  Goal: Patient will remain free of falls  Description: INTERVENTIONS:  - Educate patient/family on patient safety including physical limitations  - Instruct patient to call for assistance with activity   - Consult OT/PT to assist with strengthening/mobility   - Keep Call bell within reach  - Keep bed low and locked with side rails adjusted as appropriate  - Keep care items and personal belongings within reach  - Initiate and maintain comfort rounds  - Make Fall Risk Sign visible to staff  - Offer Toileting every  Hours,  in advance of need  - Initiate/Maintain alarm  - Obtain necessary fall risk management equipment:   - Apply yellow socks and bracelet for high fall risk patients  - Consider moving patient to room near nurses station  Outcome: Progressing  Goal: Maintain or return to baseline ADL function  Description: INTERVENTIONS:  -  Assess patient's ability to carry out ADLs; assess patient's baseline for ADL function and identify physical deficits which impact ability to perform ADLs (bathing, care of mouth/teeth, toileting, grooming, dressing, etc.)  - Assess/evaluate cause of self-care deficits   - Assess range of motion  - Assess patient's mobility; develop plan if impaired  - Assess patient's need for assistive devices and provide as appropriate  - Encourage maximum independence but intervene and supervise when necessary  - Involve family in performance of ADLs  - Assess for home care needs following discharge   - Consider OT consult to assist with ADL evaluation and planning for discharge  - Provide patient education as appropriate  Outcome: Progressing  Goal: Maintains/Returns to pre admission functional level  Description: INTERVENTIONS:  - Perform AM-PAC 6 Click Basic Mobility/ Daily Activity assessment daily.  - Set and communicate daily mobility goal to care team and patient/family/caregiver.   - Collaborate with rehabilitation services on mobility goals if consulted  - Perform Range of Motion  times a day.  - Reposition patient every  hours.  - Dangle patient  times a day  - Stand patient  times a day  - Ambulate patient  times a day  - Out of bed to chair times a day   - Out of bed for meals times a day  - Out of bed for toileting  - Record patient progress and toleration of activity level   Outcome: Progressing     Problem: DISCHARGE PLANNING  Goal: Discharge to home or other facility with appropriate resources  Description: INTERVENTIONS:  - Identify barriers to discharge w/patient and caregiver  - Arrange for  needed discharge resources and transportation as appropriate  - Identify discharge learning needs (meds, wound care, etc.)  - Arrange for interpretive services to assist at discharge as needed  - Refer to Case Management Department for coordinating discharge planning if the patient needs post-hospital services based on physician/advanced practitioner order or complex needs related to functional status, cognitive ability, or social support system  Outcome: Progressing     Problem: Knowledge Deficit  Goal: Patient/family/caregiver demonstrates understanding of disease process, treatment plan, medications, and discharge instructions  Description: Complete learning assessment and assess knowledge base.  Interventions:  - Provide teaching at level of understanding  - Provide teaching via preferred learning methods  Outcome: Progressing     Problem: CARDIOVASCULAR - ADULT  Goal: Maintains optimal cardiac output and hemodynamic stability  Description: INTERVENTIONS:  - Monitor I/O, vital signs and rhythm  - Monitor for S/S and trends of decreased cardiac output  - Administer and titrate ordered vasoactive medications to optimize hemodynamic stability  - Assess quality of pulses, skin color and temperature  - Assess for signs of decreased coronary artery perfusion  - Instruct patient to report change in severity of symptoms  Outcome: Progressing  Goal: Absence of cardiac dysrhythmias or at baseline rhythm  Description: INTERVENTIONS:  - Continuous cardiac monitoring, vital signs, obtain 12 lead EKG if ordered  - Administer antiarrhythmic and heart rate control medications as ordered  - Monitor electrolytes and administer replacement therapy as ordered  Outcome: Progressing     Problem: RESPIRATORY - ADULT  Goal: Achieves optimal ventilation and oxygenation  Description: INTERVENTIONS:  - Assess for changes in respiratory status  - Assess for changes in mentation and behavior  - Position to facilitate oxygenation and  minimize respiratory effort  - Oxygen administered by appropriate delivery if ordered  - Initiate smoking cessation education as indicated  - Encourage broncho-pulmonary hygiene including cough, deep breathe, Incentive Spirometry  - Assess the need for suctioning and aspirate as needed  - Assess and instruct to report SOB or any respiratory difficulty  - Respiratory Therapy support as indicated  Outcome: Progressing     Problem: METABOLIC, FLUID AND ELECTROLYTES - ADULT  Goal: Electrolytes maintained within normal limits  Description: INTERVENTIONS:  - Monitor labs and assess patient for signs and symptoms of electrolyte imbalances  - Administer electrolyte replacement as ordered  - Monitor response to electrolyte replacements, including repeat lab results as appropriate  - Instruct patient on fluid and nutrition as appropriate  Outcome: Progressing  Goal: Fluid balance maintained  Description: INTERVENTIONS:  - Monitor labs   - Monitor I/O and WT  - Instruct patient on fluid and nutrition as appropriate  - Assess for signs & symptoms of volume excess or deficit  Outcome: Progressing  Goal: Glucose maintained within target range  Description: INTERVENTIONS:  - Monitor Blood Glucose as ordered  - Assess for signs and symptoms of hyperglycemia and hypoglycemia  - Administer ordered medications to maintain glucose within target range  - Assess nutritional intake and initiate nutrition service referral as needed  Outcome: Progressing

## 2024-06-18 NOTE — ASSESSMENT & PLAN NOTE
Meeting SIRS criteria with tachycardia, leukocytosis likely reactive due to CHF exacerbation.   No signs or symptoms of infection at this time  Remains afebrile and WBC downtrending on last check  Tachycardia resolved

## 2024-06-19 ENCOUNTER — TELEPHONE (OUTPATIENT)
Dept: CARDIOLOGY CLINIC | Facility: CLINIC | Age: 49
End: 2024-06-19

## 2024-06-19 NOTE — TELEPHONE ENCOUNTER
Symptoms:  -leg swelling [x]  -abdominal bloating, distension, pants fitting tighter []    -loss of appetite, feeling full sooner than usual []  -worsening shortness of breath/SCHAFFER, activity intolerance[]  -difficulty lying flat, waking up a night feeling breathless, using more pillows, sleeping in a recliner []  -palpitations []  -chest pain/pressure []  -new or worsening cough []  -unusual fatigue []    Comments:  Less then before being admitted.      Weight:   -weighs self daily [x]  -dry/target weight ___240__  -today's weight __248.8 lbs___  -understands what to do for rapid weight gain, ie 3lbs in 24 hours or 5 lbs in one week[x]    Comments:  Educated on      Diet:   -consuming any high sodium foods (canned soups, lunch meats, fast food/take out, snack food, prepared foods, sport drinks) yes[]no[]  - fluid consumed per day-    50-60       oz   -2g (2000 mg) Sodium, 2L (2000 ml, around 60-64 oz) fluid restriction] reinforced [x]      Comments:  Educated on.  Has not had much to eat today, mostly sleeping.       Medications:  -med list reviewed [x]   -missed doses or taking a different dose than prescribed?  yes[]no[x]  -still urinates well on current diuretic ? yes[x]no[]  -using O2 as directed? yes[]no[]    Comments:      Home vital signs: (if available)  BP ____does not have a home bp monitor  HR____  Pulse ox____    Recent labs:   BMP/CMP -   BNP, NT Pro BNP-  CBC-    Device check: N/A  Arrhythmia burden ?     Optivol/Corvue crossed ?      Other possible triggers ?:none  Recent viral symptoms/infection []  NSAID use []  Steroids []  Anemia []  COPD/hypoxia []  Not using CPAP/BiPAP []    Comments:Self-management/education and teach back:  Primary learner: self  Following low sodium diet: yes  has not had much to eat today.  Following fluid restriction: yes  Hospital discharge weight: 248 lbs 3.2 oz  Weighing daily:  y          Recording:y  1st home weight       Weight today:248.8 lbs  Monitoring symptoms:  Y  Any current symptoms:  see above  Knows when to call provider: educated on  Medication reviewed and taking all as prescribed:Y  Knows name of diuretic:Y  Escalation plan: not yet  HF education reviewed/reinforced including low sodium diet, 64 oz fluid restriction, activity, symptoms of decompensation and when and who to call.     Care Coordination:  Aware of cardiology follow up appointment: Y  Aware of PCP follow up appointment:Y  Transportation:Y  Social Support: lives with    Insurance/financial concerns:n  Home health care: n  Health literacy:fair  Engagement:good  Personal Goal: stay out of the hospital  Additional comments: pt is fine with frequent calls to get update on status.

## 2024-06-20 ENCOUNTER — TELEMEDICINE (OUTPATIENT)
Dept: CARDIOLOGY CLINIC | Facility: CLINIC | Age: 49
End: 2024-06-20
Payer: COMMERCIAL

## 2024-06-20 VITALS — BODY MASS INDEX: 43.02 KG/M2 | WEIGHT: 252 LBS | HEIGHT: 64 IN

## 2024-06-20 DIAGNOSIS — E66.01 MORBID OBESITY (HCC): ICD-10-CM

## 2024-06-20 DIAGNOSIS — Q23.1 BICUSPID AORTIC VALVE: ICD-10-CM

## 2024-06-20 DIAGNOSIS — E78.2 MIXED HYPERLIPIDEMIA: ICD-10-CM

## 2024-06-20 DIAGNOSIS — I10 ESSENTIAL HYPERTENSION: ICD-10-CM

## 2024-06-20 DIAGNOSIS — I50.32 CHRONIC DIASTOLIC HEART FAILURE (HCC): Primary | ICD-10-CM

## 2024-06-20 DIAGNOSIS — E10.59 TYPE 1 DIABETES MELLITUS WITH OTHER CIRCULATORY COMPLICATION (HCC): ICD-10-CM

## 2024-06-20 DIAGNOSIS — I25.10 CORONARY ARTERY DISEASE INVOLVING NATIVE CORONARY ARTERY OF NATIVE HEART WITHOUT ANGINA PECTORIS: ICD-10-CM

## 2024-06-20 PROCEDURE — 99214 OFFICE O/P EST MOD 30 MIN: CPT | Performed by: NURSE PRACTITIONER

## 2024-06-20 NOTE — LETTER
June 20, 2024     Antione Pratt MD  [address]  [address]  [place] [address]    Patient: Naina Jamison   YOB: 1975   Date of Visit: 6/20/2024       Dear Dr. Pratt:    Thank you for referring Naina Jamison to me for evaluation. Below are my notes for this consultation.    If you have questions, please do not hesitate to call me. I look forward to following your patient along with you.         Sincerely,        PENG Hobson        CC: MD Geraldine Bell CRNP  6/20/2024  2:33 PM  Sign when Signing Visit  Cardiology  Heart Failure   Follow Up Office Visit Note -     Naina Jamison   49 y.o.   female   MRN: [id number]  [facility] [place]  [address]  [address]  [facility] [address]  [phone]    PCP: Antione Pratt MD  Cardiologist : Dr Newell        Virtual Regular Visit    Verification of patient location:    Patient is located at Home in the following state in which I hold an active license [place]      Assessment/Plan:  Summary of Plan:  2 g sodium diet, 2 L fluid restriction Daily weight.  Liberated to taking extra dose of torsemide 40 mg as needed for weight gain 3 pounds in 24 hours  She declined a referral to a dietitian.  She is comfortable with the information that she will be receiving from her insurance regarding a salt restricted diet  Nonfasting BMP, BNP Monday, June 24  Follow-up July 11 with STEVEN Mota which is already been established  Follow up will be scheduled with Dr Newell          Impression/plan  Chronic HFpEF.  Recent ADM for decompensation 6/14 - 6/18/2024  She underwent left heart catheterization which showed stable CAD and a patent LAD stent.  Dry weight: In the 240s  Wt Readings from Last 3 Encounters:   06/18/24 113 kg (248 lb 3.2 oz)   06/13/24 119 kg (263 lb 3.2 oz)   05/22/24 118 kg (260 lb 3.2 oz)   --beta-blocker:    --Diuretic: Torsemide 40 mg daily.  In the  "recent past was on Lasix at 20 to 40 mg.  This was escalated to 80 mg a day before she went to the hospital but she had not really evened had an opportunity to try this much.  --ACE/ARB/ARNI: Lisinopril 10 mg daily  --MRA: Spironolactone 25 mg daily.  This is new  --2 g sodium diet, 1800 cc fluid restriction. Daily weights.   CAD s/p KSENIA x 1 to mLAD after NSTEMI 7/4/2023  On DAPT: ASA + Plavix 75mg/d.  After July 1 she will be solely on aspirin  BB stopped in March 2024 due to fatigue per prior notes.   Lipitor 40 mg daily  Nitroglycerin causes headaches  Patient was taken off of metoprolol due to complaints of fatigue.  Hypertension.  Unable to assess BP at this time on lisinopril 10 mg daily, spironolactone 25 mg daily, torsemide  Bicuspid aortic valve (identified by DARRICK 9/7/2023), with moderate AS  Hyperlipidemia. On atorvastatin 40 mg/d.  6/17/2024: Calculated LDL 59.  Continue the current plan   Latest Reference Range & Units 07/05/23 05:53 06/17/24 04:50   Cholesterol See Comment mg/dL 105 140   Triglycerides See Comment mg/dL 73 149   HDL >=50 mg/dL 57 51   Non-HDL Cholesterol mg/dl 48    LDL Calculated 0 - 100 mg/dL 33 59   Morbid obesity BMI 42  Type 1 diabetes.  Last hemoglobin A1c was 9.2%  ( 6/17/24) per endocrinology  Cardiac testing  DARRICK 9/7/2023 bicuspid aortic valve   TTE 07/05/2023: LVEF 65%. LVIDd 4.1 cm. Normal RV. Possible bicuspid AV with mild to moderate AS.    [facility] 07/05/2023: 95% stenosis mid LAD (received PCI with KSENIA x1).   [facility] 6/17/2024.  Patent LAD stent.  Minimal CAD in the remaining epicardial vessels            .                 HPI:   Naina Jamison is a 49 y.o.year old  with coronary disease ,S/P  MI last year with drug-eluting stent to the LAD. She has a bicuspid valve with moderate aortic stenosis.    3/6/2024 OV Dr. Newell  Per his note:  \"She presented with atypical symptoms SLB and found to have elevated troponin > 4000. She underwent cardiac cath which showed a mid-LAD 95% " culprit lesion which was stented (Xience Skypoint 3 x 33 mm KSENIA). Echo showed normal LV function and mild-moderate AS. I personally reviewed and interpreted the echo images from 7/5/23 and agree as reported that her AV is possibly bicuspid. We started Jardiance last visit however patient is no longer taking. She has been having random episodes of dyspnea lately. She feels like she can't get a breath. Sometimes it feels like her throat is closing. It happens at night in bed and sitting upright driving she notices. It has been occurring since her NSTEMI.      4/18/2024 OV STEVEN Beal  Per her office note:  Has only taken Lasix once since being prescribed. Advised to begin Lasix 20 mg BID.   Advised to being daily weights.   Advised to cut back fluid intake to ~65-70 oz daily.   Will ask office RN to contact patient early next week for clinical update.   if later determined that she will require Lasix to maintain euvolemia, would consider discontinuing HCTZ at that time.   TTE and Holter monitor scheduled for next week.   Will check BMP and BNP in ~10-14 days.         5/22/2024 office visit Dr. Osiris Alvarezmike Jamison is a 48 y.o. year old female with morbid obesity, T1DM, HLD, and premature CAD with NSTEMI and PCI to LAD in July 2023 presenting for follow-up.   She presented with atypical symptoms SLB and found to have elevated troponin > 4000. She underwent cardiac cath 7/5/23 which showed a mid-LAD 95% culprit lesion which was stented (Xience Skypoint 3 x 33 mm KSENIA). Echo showed normal LV function and mild-moderate AS. We followed up with DARRICK on 9/7/23 which confirmed congenitally bicuspid AV.   She continues to feel tired. She also feels dyspneic and swollen  Seen by Kriss KHOURY, was advised to take lasix 20 mg daily but has only been taking daily. She has been using a wedge pillow.  When flat she has orthopnea.       6/13/2024 office visit Dr. Newell  Per his note:  1. Acute on chronic diastolic  "(congestive) heart failure (HCC)  She has persistent symptoms of volume overload today  Failed increase of lasix to 40 mg daily   Given lasix 40 mg IV once in office today with 700 cc UOP  Continue lasix 40 mg daily with addition of spironolactone 25 mg daily  If does not continue to diurese increase lasix to 80 mg daily  2. Coronary artery disease involving native coronary artery of native heart without angina pectoris  Continue DAPT Until July 2024 then aspirin 81 mg daily        Adm 6/14-6/18/24  CC:Recently gaining weight. worsening shortness of breath, weight gain, and now worsening chest pressure with radiation to her neck   DX: Acute on chronic diastolic congestive heart failure.   She was only recently started on diuretics these have been ramped up she got a dose of IV Lasix (earlier last week) in the office but was having episodes of chest pain which prompted ER evaluation.  She is volume overloaded.   diuresed with increase Lasix to 60 IV twice daily.   Dry weight is in the 240s.  She also c/o chest pain which she says is very similar to her prior symptoms of ischemia and she has had LAD stent in the past. Some sx occur at rest  Troponin is negative   EKG is unremarkable  She diuresed with Lasix 60 IV 3 times daily  [facility] pursued: Stable CAD  Recommend continuing DAPT until 7 1 then can stop Plavix and continue on spirin 81 mg only  Chest pain felt to be due to acute heart failure  Discharge weight : 248   Discharge creatinine: 0.75  Discharge diuretics:torsemide 40 mg/d + spironolactone   Of note: Hyponatremic with sodium levels around 131        6/20/24  Virtual visit by video  She continues to improve, she still has some overall generalized edema but it is significantly better than before.  She did not weigh herself yesterday, as she was \"delirious\" after the hospital course, and was just getting reacclimated to home..  Her weight this morning and very little clothing was 252 pounds.  She is abiding by a " salt restricted diet.  She will be receiving some information from her insurance company about a salt restricted diet.  She has been educated about the importance of a salt restricted diet, and daily weights along with the fluid restriction she reports  She is currently on torsemide 40 mg daily as well as spironolactone 25 mg daily in the morning  She is amenable to labs next Monday on the 24th.  I will order a nonfasting BMP and BNP.  I will ask staff to fax it to [facility] where she will be going based on her insurance request.  She asked what brought her into the acute heart failure.  It is not clear.  I am not certain whether there is potentially some sodium dietary indiscretion.  She does have moderate aortic stenosis.  She will return to see Kriss Medrano PA-C July 11 in the office.  We reviewed this.  She declined needing any med refills today.          Problem List Items Addressed This Visit          Cardiovascular and Mediastinum    Type 1 diabetes mellitus with other circulatory complication (HCC)    Essential hypertension    Coronary artery disease involving native coronary artery of native heart without angina pectoris    Bicuspid aortic valve    Chronic diastolic heart failure (HCC) - Primary    Relevant Orders    Basic metabolic panel    B-Type Natriuretic Peptide(BNP)       Other    Morbid obesity    Hyperlipidemia            Reason for visit is   Chief Complaint   Patient presents with   • Hospital Follow-up     Hospital follow-up for acute on chronic diastolic heart failure     • Fatigue     From hospital stay   • Edema     Whole body fluid retention   • Shortness of Breath     Not since she has been home   • Dizziness     Not since she has been home from hospital stay   • Virtual Regular Visit          Encounter provider PENG Hobson      Recent Visits  No visits were found meeting these conditions.  Showing recent visits within past 7 days and meeting all other requirements  Today's  Visits  Date Type Provider Dept   06/20/24 Telemedicine PENG Hobson [facility]  [place]   Showing today's visits and meeting all other requirements  Future Appointments  No visits were found meeting these conditions.  Showing future appointments within next 150 days and meeting all other requirements       The patient was identified by name and date of birth. Naina Jamison was informed that this is a telemedicine visit and that the visit is being conducted through the Epic Embedded platform. She agrees to proceed..  My office door was closed. No one else was in the room.  She acknowledged consent and understanding of privacy and security of the video platform. The patient has agreed to participate and understands they can discontinue the visit at any time.    Patient is aware this is a billable service.         Past Medical History:   Diagnosis Date   • Diabetes mellitus (HCC)    • Graves disease    • High cholesterol    • Hypertension        Past Surgical History:   Procedure Laterality Date   • ABDOMINAL SURGERY      Gallbladder removal   • CARDIAC CATHETERIZATION Left 7/5/2023    Procedure: Cardiac Left Heart Cath;  Surgeon: Elba Nevarez DO;  Location: [facility];  Service: Cardiology   • CARDIAC CATHETERIZATION N/A 7/5/2023    Procedure: Cardiac Coronary Angiogram;  Surgeon: Elba Nevarez DO;  Location: [facility];  Service: Cardiology   • CARDIAC CATHETERIZATION Left 6/17/2024    Procedure: Cardiac Left Heart Cath;  Surgeon: Elba Nevarez DO;  Location: [facility];  Service: Cardiology   • CARDIAC CATHETERIZATION N/A 6/17/2024    Procedure: Cardiac Coronary Angiogram;  Surgeon: Elba Nevarez DO;  Location: [facility];  Service: Cardiology   • HYSTERECTOMY     • US GUIDED THYROID BIOPSY  10/4/2018       Current Outpatient Medications   Medication Sig Dispense Refill   • ALPRAZolam (XANAX) 1 mg tablet Take 1 mg by mouth daily at bedtime as  needed for anxiety Pt unable to verify dose for RN     • aspirin (ECOTRIN LOW STRENGTH) 81 mg EC tablet Take 1 tablet (81 mg total) by mouth daily Do not start before July 7, 2023.  0   • atorvastatin (LIPITOR) 40 mg tablet      • cholecalciferol (VITAMIN D3) 1,000 units tablet Take 2,000 Units by mouth daily Pt unable to verify dose for RN     • clopidogrel (Plavix) 75 mg tablet Take 1 tablet (75 mg total) by mouth daily 90 tablet 1   • Continuous Blood Gluc Sensor (Guardian 4 Glucose Sensor) MISC Use 1 each every 7 days 15 each 0   • DULoxetine (CYMBALTA) 30 mg delayed release capsule Take 30 mg by mouth daily Pt unable to verify dose for RN     • HYDROcodone-acetaminophen (NORCO) 5-325 mg per tablet Take 1 tablet by mouth every 6 (six) hours as needed for pain Pt unable to verify dose for RN     • lisinopril (ZESTRIL) 10 mg tablet Take 1 tablet (10 mg total) by mouth daily 90 tablet 3   • nitroglycerin (NITROSTAT) 0.4 mg SL tablet Place 1 tablet (0.4 mg total) under the tongue every 5 (five) minutes as needed for chest pain 30 tablet 2   • NovoLOG 100 UNIT/ML injection To be used in insulin pump approximately 120 units a day 110 mL 1   • PATIENT MAINTAINED INSULIN PUMP Inject 1 each under the skin every 8 (eight) hours     • spironolactone (ALDACTONE) 25 mg tablet Take 1 tablet (25 mg total) by mouth daily 30 tablet 11   • torsemide (DEMADEX) 20 mg tablet Take 2 tablets (40 mg total) by mouth daily 60 tablet 0   • Continuous Blood Gluc Transmit (Guardian 4 Transmitter) MISC To check blood sugars continuously (Patient not taking: Reported on 6/20/2024) 1 each 0     No current facility-administered medications for this visit.        Allergies   Allergen Reactions   • Ticagrelor Anaphylaxis     Made throat feel like it was closing   • Milnacipran GI Intolerance   • Liraglutide GI Intolerance     Heartburn, nausea.   • Oxycodone-Acetaminophen Vomiting and GI Intolerance   • Pregabalin Other (See Comments)     Didn't  "help       Review of Systems   Constitutional:  Negative for activity change, appetite change, chills, diaphoresis, fatigue, fever and unexpected weight change.        Admits to some generalized edema   Respiratory:  Negative for cough, choking, chest tightness, shortness of breath and wheezing.    Cardiovascular:  Positive for leg swelling. Negative for chest pain and palpitations.   Skin:  Negative for color change, pallor and rash.   Neurological:  Negative for dizziness, light-headedness and headaches.   All other systems reviewed and are negative.      Video Exam    Vitals:    06/20/24 1351   Weight: 114 kg (252 lb)   Height: 5' 4\" (1.626 m)       Physical Exam  Constitutional:       Comments: No conversational dyspnea.   Neurological:      Mental Status: She is alert.          Visit Time  Total Visit Duration: 16 mins        "

## 2024-06-20 NOTE — UTILIZATION REVIEW
NOTIFICATION OF ADMISSION DISCHARGE   This is a Notification of Discharge from [facility]. Please be advised that this patient has been discharge from our facility. Below you will find the admission and discharge date and time including the patient’s disposition.   UTILIZATION REVIEW CONTACT:  Kami Fuentes  Utilization   Network Utilization Review Department  Phone: [phone] x carefully listen to the prompts. All voicemails are confidential.  Email: [email or web address]     ADMISSION INFORMATION  PRESENTATION DATE: 6/14/2024 11:11 AM  OBERVATION ADMISSION DATE:   INPATIENT ADMISSION DATE: 6/14/24  3:12 PM   DISCHARGE DATE: 6/18/2024  4:39 PM   DISPOSITION:Home/Self Care    Network Utilization Review Department  ATTENTION: Please call with any questions or concerns to [phone] and carefully listen to the prompts so that you are directed to the right person. All voicemails are confidential.   For Discharge needs, contact Care Management DC Support Team at [phone] opt. 2  Send all requests for admission clinical reviews, approved or denied determinations and any other requests to dedicated fax number below belonging to the campus where the patient is receiving treatment. List of dedicated fax numbers for the Facilities:  FACILITY NAME UR FAX NUMBER   ADMISSION DENIALS (Administrative/Medical Necessity) [phone]   DISCHARGE SUPPORT TEAM ([facility]) [phone]   PARENT CHILD HEALTH (Maternity/NICU/Pediatrics) [phone]   [facility] [phone]   [facility] [phone]   [facility] [phone]   [facility] [phone]   [facility] [phone]   [facility] [phone]   [facility] [phone]   [facility] [phone]    [facility] [phone]   [facility] [phone]   [facility] [phone]   [facility] [phone]

## 2024-06-20 NOTE — PROGRESS NOTES
Cardiology  Heart Failure   Follow Up Office Visit Note -     Naina Jamison   49 y.o.   female   MRN: [id number]  [facility] [place]  [address]  [address]  [place] [address] [phone]    PCP: Antione Pratt MD  Cardiologist : Dr Newell        Virtual Regular Visit    Verification of patient location:    Patient is located at Home in the following state in which I hold an active license [place]      Assessment/Plan:  Summary of Plan:  2 g sodium diet, 2 L fluid restriction Daily weight.  Liberated to taking extra dose of torsemide 40 mg as needed for weight gain 3 pounds in 24 hours  She declined a referral to a dietitian.  She is comfortable with the information that she will be receiving from her insurance regarding a salt restricted diet  Nonfasting BMP, BNP Monday, June 24  Follow-up July 11 with STEVEN Mota which is already been established  Follow up will be scheduled with Dr Newell          Impression/plan  Chronic HFpEF.  Recent ADM for decompensation 6/14 - 6/18/2024  She underwent left heart catheterization which showed stable CAD and a patent LAD stent.  Dry weight: In the 240s  Wt Readings from Last 3 Encounters:   06/18/24 113 kg (248 lb 3.2 oz)   06/13/24 119 kg (263 lb 3.2 oz)   05/22/24 118 kg (260 lb 3.2 oz)   --beta-blocker:    --Diuretic: Torsemide 40 mg daily.  In the recent past was on Lasix at 20 to 40 mg.  This was escalated to 80 mg a day before she went to the hospital but she had not really evened had an opportunity to try this much.  --ACE/ARB/ARNI: Lisinopril 10 mg daily  --MRA: Spironolactone 25 mg daily.  This is new  --2 g sodium diet, 1800 cc fluid restriction. Daily weights.   CAD s/p KSENIA x 1 to mLAD after NSTEMI 7/4/2023  On DAPT: ASA + Plavix 75mg/d.  After July 1 she will be solely on aspirin  BB stopped in March 2024 due to fatigue per prior notes.   Lipitor 40 mg daily  Nitroglycerin causes headaches  Patient was taken  "off of metoprolol due to complaints of fatigue.  Hypertension.  Unable to assess BP at this time on lisinopril 10 mg daily, spironolactone 25 mg daily, torsemide  Bicuspid aortic valve (identified by DARRICK 9/7/2023), with moderate AS  Hyperlipidemia. On atorvastatin 40 mg/d.  6/17/2024: Calculated LDL 59.  Continue the current plan   Latest Reference Range & Units 07/05/23 05:53 06/17/24 04:50   Cholesterol See Comment mg/dL 105 140   Triglycerides See Comment mg/dL 73 149   HDL >=50 mg/dL 57 51   Non-HDL Cholesterol mg/dl 48    LDL Calculated 0 - 100 mg/dL 33 59   Morbid obesity BMI 42  Type 1 diabetes.  Last hemoglobin A1c was 9.2%  ( 6/17/24) per endocrinology  Cardiac testing  DARRICK 9/7/2023 bicuspid aortic valve   TTE 07/05/2023: LVEF 65%. LVIDd 4.1 cm. Normal RV. Possible bicuspid AV with mild to moderate AS.    [facility] 07/05/2023: 95% stenosis mid LAD (received PCI with KSENIA x1).   [facility] 6/17/2024.  Patent LAD stent.  Minimal CAD in the remaining epicardial vessels            .                 HPI:   Naina Jamison is a 49 y.o.year old  with coronary disease ,S/P  MI last year with drug-eluting stent to the LAD. She has a bicuspid valve with moderate aortic stenosis.    3/6/2024 OV Dr. Newell  Per his note:  \"She presented with atypical symptoms SLB and found to have elevated troponin > 4000. She underwent cardiac cath which showed a mid-LAD 95% culprit lesion which was stented (Xience Skypoint 3 x 33 mm KSENIA). Echo showed normal LV function and mild-moderate AS. I personally reviewed and interpreted the echo images from 7/5/23 and agree as reported that her AV is possibly bicuspid. We started Jardiance last visit however patient is no longer taking. She has been having random episodes of dyspnea lately. She feels like she can't get a breath. Sometimes it feels like her throat is closing. It happens at night in bed and sitting upright driving she notices. It has been occurring since her NSTEMI.      4/18/2024 OV M. " STEVEN Medrano  Per her office note:  Has only taken Lasix once since being prescribed. Advised to begin Lasix 20 mg BID.   Advised to being daily weights.   Advised to cut back fluid intake to ~65-70 oz daily.   Will ask office RN to contact patient early next week for clinical update.   if later determined that she will require Lasix to maintain euvolemia, would consider discontinuing HCTZ at that time.   TTE and Holter monitor scheduled for next week.   Will check BMP and BNP in ~10-14 days.         5/22/2024 office visit Dr. Newell  Naina Jamison is a 48 y.o. year old female with morbid obesity, T1DM, HLD, and premature CAD with NSTEMI and PCI to LAD in July 2023 presenting for follow-up.   She presented with atypical symptoms SLB and found to have elevated troponin > 4000. She underwent cardiac cath 7/5/23 which showed a mid-LAD 95% culprit lesion which was stented (Xience Skypoint 3 x 33 mm KSENIA). Echo showed normal LV function and mild-moderate AS. We followed up with DARRICK on 9/7/23 which confirmed congenitally bicuspid AV.   She continues to feel tired. She also feels dyspneic and swollen  Seen by Kriss KHOURY, was advised to take lasix 20 mg daily but has only been taking daily. She has been using a wedge pillow.  When flat she has orthopnea.       6/13/2024 office visit Dr. Newell  Per his note:  1. Acute on chronic diastolic (congestive) heart failure (HCC)  She has persistent symptoms of volume overload today  Failed increase of lasix to 40 mg daily   Given lasix 40 mg IV once in office today with 700 cc UOP  Continue lasix 40 mg daily with addition of spironolactone 25 mg daily  If does not continue to diurese increase lasix to 80 mg daily  2. Coronary artery disease involving native coronary artery of native heart without angina pectoris  Continue DAPT Until July 2024 then aspirin 81 mg daily        Adm 6/14-6/18/24  CC:Recently gaining weight. worsening shortness of breath, weight gain,  "and now worsening chest pressure with radiation to her neck   DX: Acute on chronic diastolic congestive heart failure.   She was only recently started on diuretics these have been ramped up she got a dose of IV Lasix (earlier last week) in the office but was having episodes of chest pain which prompted ER evaluation.  She is volume overloaded.   diuresed with increase Lasix to 60 IV twice daily.   Dry weight is in the 240s.  She also c/o chest pain which she says is very similar to her prior symptoms of ischemia and she has had LAD stent in the past. Some sx occur at rest  Troponin is negative   EKG is unremarkable  She diuresed with Lasix 60 IV 3 times daily  [facility] pursued: Stable CAD  Recommend continuing DAPT until 7 1 then can stop Plavix and continue on spirin 81 mg only  Chest pain felt to be due to acute heart failure  Discharge weight : 248   Discharge creatinine: 0.75  Discharge diuretics:torsemide 40 mg/d + spironolactone   Of note: Hyponatremic with sodium levels around 131        6/20/24  Virtual visit by video  She continues to improve, she still has some overall generalized edema but it is significantly better than before.  She did not weigh herself yesterday, as she was \"delirious\" after the hospital course, and was just getting reacclimated to home..  Her weight this morning and very little clothing was 252 pounds.  She is abiding by a salt restricted diet.  She will be receiving some information from her insurance company about a salt restricted diet.  She has been educated about the importance of a salt restricted diet, and daily weights along with the fluid restriction she reports  She is currently on torsemide 40 mg daily as well as spironolactone 25 mg daily in the morning  She is amenable to labs next Monday on the 24th.  I will order a nonfasting BMP and BNP.  I will ask staff to fax it to [facility] where she will be going based on her insurance request.  She asked what brought her into the acute " heart failure.  It is not clear.  I am not certain whether there is potentially some sodium dietary indiscretion.  She does have moderate aortic stenosis.  She will return to see Kriss Medrano PA-C July 11 in the office.  We reviewed this.  She declined needing any med refills today.          Problem List Items Addressed This Visit          Cardiovascular and Mediastinum    Type 1 diabetes mellitus with other circulatory complication (HCC)    Essential hypertension    Coronary artery disease involving native coronary artery of native heart without angina pectoris    Bicuspid aortic valve    Chronic diastolic heart failure (HCC) - Primary    Relevant Orders    Basic metabolic panel    B-Type Natriuretic Peptide(BNP)       Other    Morbid obesity    Hyperlipidemia            Reason for visit is   Chief Complaint   Patient presents with    Hospital Follow-up     Hospital follow-up for acute on chronic diastolic heart failure      Fatigue     From hospital stay    Edema     Whole body fluid retention    Shortness of Breath     Not since she has been home    Dizziness     Not since she has been home from hospital stay    Virtual Regular Visit          Encounter provider PENG Hobson      Recent Visits  No visits were found meeting these conditions.  Showing recent visits within past 7 days and meeting all other requirements  Today's Visits  Date Type Provider Dept   06/20/24 Telemedicine [facility]   Showing today's visits and meeting all other requirements  Future Appointments  No visits were found meeting these conditions.  Showing future appointments within next 150 days and meeting all other requirements       The patient was identified by name and date of birth. Naina Shaheen was informed that this is a telemedicine visit and that the visit is being conducted through the Epic Embedded platform. She agrees to proceed..  My office door was closed. No one else was in the  room.  She acknowledged consent and understanding of privacy and security of the video platform. The patient has agreed to participate and understands they can discontinue the visit at any time.    Patient is aware this is a billable service.         Past Medical History:   Diagnosis Date    Diabetes mellitus (HCC)     Graves disease     High cholesterol     Hypertension        Past Surgical History:   Procedure Laterality Date    ABDOMINAL SURGERY      Gallbladder removal    CARDIAC CATHETERIZATION Left 7/5/2023    Procedure: Cardiac Left Heart Cath;  Surgeon: Elba Nevarez DO;  Location: [facility];  Service: Cardiology    CARDIAC CATHETERIZATION N/A 7/5/2023    Procedure: Cardiac Coronary Angiogram;  Surgeon: Elba Nevarez DO;  Location: [facility];  Service: Cardiology    CARDIAC CATHETERIZATION Left 6/17/2024    Procedure: Cardiac Left Heart Cath;  Surgeon: Elba Nevarez DO;  Location: [facility];  Service: Cardiology    CARDIAC CATHETERIZATION N/A 6/17/2024    Procedure: Cardiac Coronary Angiogram;  Surgeon: Elba Nevarez DO;  Location: [facility];  Service: Cardiology    HYSTERECTOMY      US GUIDED THYROID BIOPSY  10/4/2018       Current Outpatient Medications   Medication Sig Dispense Refill    ALPRAZolam (XANAX) 1 mg tablet Take 1 mg by mouth daily at bedtime as needed for anxiety Pt unable to verify dose for RN      aspirin (ECOTRIN LOW STRENGTH) 81 mg EC tablet Take 1 tablet (81 mg total) by mouth daily Do not start before July 7, 2023.  0    atorvastatin (LIPITOR) 40 mg tablet       cholecalciferol (VITAMIN D3) 1,000 units tablet Take 2,000 Units by mouth daily Pt unable to verify dose for RN      clopidogrel (Plavix) 75 mg tablet Take 1 tablet (75 mg total) by mouth daily 90 tablet 1    Continuous Blood Gluc Sensor (Guardian 4 Glucose Sensor) MISC Use 1 each every 7 days 15 each 0    DULoxetine (CYMBALTA) 30 mg delayed release capsule Take 30 mg by  mouth daily Pt unable to verify dose for RN      HYDROcodone-acetaminophen (NORCO) 5-325 mg per tablet Take 1 tablet by mouth every 6 (six) hours as needed for pain Pt unable to verify dose for RN      lisinopril (ZESTRIL) 10 mg tablet Take 1 tablet (10 mg total) by mouth daily 90 tablet 3    nitroglycerin (NITROSTAT) 0.4 mg SL tablet Place 1 tablet (0.4 mg total) under the tongue every 5 (five) minutes as needed for chest pain 30 tablet 2    NovoLOG 100 UNIT/ML injection To be used in insulin pump approximately 120 units a day 110 mL 1    PATIENT MAINTAINED INSULIN PUMP Inject 1 each under the skin every 8 (eight) hours      spironolactone (ALDACTONE) 25 mg tablet Take 1 tablet (25 mg total) by mouth daily 30 tablet 11    torsemide (DEMADEX) 20 mg tablet Take 2 tablets (40 mg total) by mouth daily 60 tablet 0    Continuous Blood Gluc Transmit (Guardian 4 Transmitter) MISC To check blood sugars continuously (Patient not taking: Reported on 6/20/2024) 1 each 0     No current facility-administered medications for this visit.        Allergies   Allergen Reactions    Ticagrelor Anaphylaxis     Made throat feel like it was closing    Milnacipran GI Intolerance    Liraglutide GI Intolerance     Heartburn, nausea.    Oxycodone-Acetaminophen Vomiting and GI Intolerance    Pregabalin Other (See Comments)     Didn't help       Review of Systems   Constitutional:  Negative for activity change, appetite change, chills, diaphoresis, fatigue, fever and unexpected weight change.        Admits to some generalized edema   Respiratory:  Negative for cough, choking, chest tightness, shortness of breath and wheezing.    Cardiovascular:  Positive for leg swelling. Negative for chest pain and palpitations.   Skin:  Negative for color change, pallor and rash.   Neurological:  Negative for dizziness, light-headedness and headaches.   All other systems reviewed and are negative.      Video Exam    Vitals:    06/20/24 1351   Weight: 114 kg  "(252 lb)   Height: 5' 4\" (1.626 m)       Physical Exam  Constitutional:       Comments: No conversational dyspnea.   Neurological:      Mental Status: She is alert.          Visit Time  Total Visit Duration: 16 mins        "

## 2024-06-26 DIAGNOSIS — E10.59 TYPE 1 DIABETES MELLITUS WITH OTHER CIRCULATORY COMPLICATION (HCC): Primary | ICD-10-CM

## 2024-06-28 DIAGNOSIS — N17.9 AKI (ACUTE KIDNEY INJURY) (HCC): Primary | ICD-10-CM

## 2024-06-28 LAB
BNP SERPL-MCNC: 13 PG/ML
BUN SERPL-MCNC: 21 MG/DL (ref 7–25)
BUN/CREAT SERPL: 18 (CALC) (ref 6–22)
CALCIUM SERPL-MCNC: 9.1 MG/DL (ref 8.6–10.2)
CHLORIDE SERPL-SCNC: 97 MMOL/L (ref 98–110)
CO2 SERPL-SCNC: 27 MMOL/L (ref 20–32)
CREAT SERPL-MCNC: 1.15 MG/DL (ref 0.5–0.99)
GFR/BSA.PRED SERPLBLD CYS-BASED-ARV: 59 ML/MIN/1.73M2
GLUCOSE SERPL-MCNC: 258 MG/DL (ref 65–99)
POTASSIUM SERPL-SCNC: 4.3 MMOL/L (ref 3.5–5.3)
SODIUM SERPL-SCNC: 136 MMOL/L (ref 135–146)

## 2024-07-01 ENCOUNTER — TELEPHONE (OUTPATIENT)
Dept: CARDIOLOGY CLINIC | Facility: CLINIC | Age: 49
End: 2024-07-01

## 2024-07-01 NOTE — TELEPHONE ENCOUNTER
Received call from [place] who is a nurse with [facility] f/u for patient stating she is concerned patient maybe experiencing orthostatic hypotension due to experiencing dizziness in changing position from sitting to standing. Patient does not have a BP cuff at home.     Reviewed patient's current torsemide dosage & 6/28 lab results, as well as F/U OV with Kriss velasquez PA-C on 7/11..

## 2024-07-02 ENCOUNTER — OFFICE VISIT (OUTPATIENT)
Dept: ENDOCRINOLOGY | Facility: CLINIC | Age: 49
End: 2024-07-02
Payer: COMMERCIAL

## 2024-07-02 VITALS
WEIGHT: 249.2 LBS | BODY MASS INDEX: 42.78 KG/M2 | SYSTOLIC BLOOD PRESSURE: 130 MMHG | OXYGEN SATURATION: 100 % | TEMPERATURE: 98.1 F | HEART RATE: 89 BPM | DIASTOLIC BLOOD PRESSURE: 68 MMHG

## 2024-07-02 DIAGNOSIS — E10.59 TYPE 1 DIABETES MELLITUS WITH OTHER CIRCULATORY COMPLICATION (HCC): Primary | ICD-10-CM

## 2024-07-02 DIAGNOSIS — I10 ESSENTIAL HYPERTENSION: ICD-10-CM

## 2024-07-02 DIAGNOSIS — E05.90 HYPERTHYROIDISM: ICD-10-CM

## 2024-07-02 DIAGNOSIS — E04.1 THYROID NODULE: ICD-10-CM

## 2024-07-02 DIAGNOSIS — E78.2 MIXED HYPERLIPIDEMIA: ICD-10-CM

## 2024-07-02 PROCEDURE — 95251 CONT GLUC MNTR ANALYSIS I&R: CPT | Performed by: STUDENT IN AN ORGANIZED HEALTH CARE EDUCATION/TRAINING PROGRAM

## 2024-07-02 PROCEDURE — 99214 OFFICE O/P EST MOD 30 MIN: CPT | Performed by: STUDENT IN AN ORGANIZED HEALTH CARE EDUCATION/TRAINING PROGRAM

## 2024-07-02 NOTE — ASSESSMENT & PLAN NOTE
Lab Results   Component Value Date    HGBA1C 9.2 (H) 06/17/2024   Uncontrolled with A1c of 9.2%, however her glycemic control has improved on 780 [employer] which time in range of 69% and minimal hypoglycemia.  She recently has started Ozempic 0.25 mg weekly which will be increased to 0.5 mg weekly after 4 weeks.  I made changes in her overnight basal rate and I decreased basal rate to 1.8 units/h at 3 AM (was decreased from 2.0 units/h).  we will maintain rest of the setting.  Return back in 3 months.  Labs prior to next visit.

## 2024-07-02 NOTE — ASSESSMENT & PLAN NOTE
History of question Graves' disease, was on methimazole which stopped in 2022, she is currently clinically and biochemically euthyroid.

## 2024-07-02 NOTE — ASSESSMENT & PLAN NOTE
Blood pressure is controlled, 130/68, currently on lisinopril and Aldactone which will be maintained.

## 2024-07-02 NOTE — PROGRESS NOTES
Established patient Progress Note      Cc: diabetes    Referring Provider  No referring provider defined for this encounter.     History of Present Illness:   Naina Jamison is a 49 y.o. female with a history of type 1 diabetes  who presented for follow up.        Reports complications of CAD S/P stent.     Current regimen:     Ozempic 0.25 mg weekly    Patient is on a Minimed Medtronic 780 pump prescribed by Endocrinology.  She has been on this pump for 1 years.   She denies any malfunctioning of the pump.      Current Insulin pump settings:  Basal rate:  12 AM:  2.2 units/h  3 AM : 2.0 units/h  7 AM: 3.3 units/h  3 PM: 3.5 units/h  10 PM: 2.8 units/h    Insulin to carb ratio:  12 AM: 5  6 AM: 2.5  10 PM: 5  Insulin sensitivity factor:  12 AM: 16  6 AM: 10  23:00 16    Type of insulin:Novolog  Active Insulin Time: 2 hr    Episodes of hypoglycemia. Use of Glucagon    Diet: Carb consistent diet  Discussed with patient in case of malfunctioning of the pump to use basal and bolus therapy as backup which is prescribed to the patient. Also notified patient to call clinic if any issues.     Naina Jamison   Device used, guardian 4  Home use       Indication   Type 1 Diabetes      More than 72 hours of data was reviewed. Report to be scanned to chart.     Date Range: June 18-July 1    Analysis of data:   Average Glucose: 168 mg/dL  Coefficient of Variation: 43.1%  SD : 73 mg/dL  Time in Target Range: 69%  Time Above Range: 40%  Time Below Range: 1%        Hypoglycemic episodes:   H/o of hypoglycemia causing hospitalization or Intervention such as glucagon injection  or ambulance call : no  Has awareness: yes       Opthamology:  overdue; will schedule   Podiatry: no    on ACE inhibitor or ARB: Lisinopril 10 mg daily, aldactone 25 mg daily   on statin: lipitor 40 mg daily    Thyroid disorders: thyroid  nodule,       Patient Active Problem List   Diagnosis    Type 1 diabetes mellitus with other circulatory complication (HCC)    Essential hypertension    Fibromyalgia    Morbid obesity    Hyperlipidemia    Anxiety    Tick bites/exposure    Coronary artery disease involving native coronary artery of native heart without angina pectoris    Graves disease    Hyperthyroidism    Esophageal reflux    Sleep apnea    Bicuspid aortic valve    SIRS (systemic inflammatory response syndrome) (HCC)    Chronic diastolic heart failure (HCC)      Past Medical History:   Diagnosis Date    Diabetes mellitus (HCC)     Graves disease     High cholesterol     Hypertension       Past Surgical History:   Procedure Laterality Date    ABDOMINAL SURGERY      Gallbladder removal    CARDIAC CATHETERIZATION Left 7/5/2023    Procedure: Cardiac Left Heart Cath;  Surgeon: Elba Nevarez DO;  Location: [facility];  Service: Cardiology    CARDIAC CATHETERIZATION N/A 7/5/2023    Procedure: Cardiac Coronary Angiogram;  Surgeon: Elba Nevarez DO;  Location: [facility];  Service: Cardiology    CARDIAC CATHETERIZATION Left 6/17/2024    Procedure: Cardiac Left Heart Cath;  Surgeon: Elba Nevarez DO;  Location: [facility];  Service: Cardiology    CARDIAC CATHETERIZATION N/A 6/17/2024    Procedure: Cardiac Coronary Angiogram;  Surgeon: Elba Nevarez DO;  Location: [facility];  Service: Cardiology    HYSTERECTOMY      US GUIDED THYROID BIOPSY  10/4/2018      History reviewed. No pertinent family history.  Social History     Tobacco Use    Smoking status: Never    Smokeless tobacco: Never   Substance Use Topics    Alcohol use: Never     Allergies   Allergen Reactions    Ticagrelor Anaphylaxis     Made throat feel like it was closing    Milnacipran GI Intolerance    Liraglutide GI Intolerance     Heartburn, nausea.    Oxycodone-Acetaminophen Vomiting and GI Intolerance    Pregabalin Other (See Comments)      Didn't help         Current Outpatient Medications:     ALPRAZolam (XANAX) 1 mg tablet, Take 1 mg by mouth daily at bedtime as needed for anxiety Pt unable to verify dose for RN, Disp: , Rfl:     aspirin (ECOTRIN LOW STRENGTH) 81 mg EC tablet, Take 1 tablet (81 mg total) by mouth daily Do not start before July 7, 2023., Disp: , Rfl: 0    atorvastatin (LIPITOR) 40 mg tablet, , Disp: , Rfl:     Continuous Blood Gluc Sensor (Guardian 4 Glucose Sensor) MISC, Use 1 each every 7 days, Disp: 15 each, Rfl: 0    Continuous Blood Gluc Transmit (Guardian 4 Transmitter) MISC, To check blood sugars continuously, Disp: 1 each, Rfl: 0    HYDROcodone-acetaminophen (NORCO) 5-325 mg per tablet, Take 1 tablet by mouth every 6 (six) hours as needed for pain Pt unable to verify dose for RN, Disp: , Rfl:     lisinopril (ZESTRIL) 10 mg tablet, Take 1 tablet (10 mg total) by mouth daily, Disp: 90 tablet, Rfl: 3    nitroglycerin (NITROSTAT) 0.4 mg SL tablet, Place 1 tablet (0.4 mg total) under the tongue every 5 (five) minutes as needed for chest pain, Disp: 30 tablet, Rfl: 2    NovoLOG 100 UNIT/ML injection, To be used in insulin pump approximately 120 units a day, Disp: 110 mL, Rfl: 1    PATIENT MAINTAINED INSULIN PUMP, Inject 1 each under the skin every 8 (eight) hours, Disp: , Rfl:     semaglutide, 0.25 or 0.5 mg/dose, (Ozempic, 0.25 or 0.5 MG/DOSE,) 2 mg/3 mL injection pen, Inject 0.375 mL (0.25 mg total) under the skin every 7 days for 30 days, THEN 0.75 mL (0.5 mg total) every 7 days., Disp: 9 mL, Rfl: 0    spironolactone (ALDACTONE) 25 mg tablet, Take 1 tablet (25 mg total) by mouth daily, Disp: 30 tablet, Rfl: 11    torsemide (DEMADEX) 20 mg tablet, Take 2 tablets (40 mg total) by mouth daily, Disp: 60 tablet, Rfl: 0    cholecalciferol (VITAMIN D3) 1,000 units tablet, Take 2,000 Units by mouth daily Pt unable to verify dose for RN (Patient not taking: Reported on 7/2/2024), Disp: , Rfl:     clopidogrel (Plavix) 75 mg tablet, Take 1  "tablet (75 mg total) by mouth daily (Patient not taking: Reported on 7/2/2024), Disp: 90 tablet, Rfl: 1    DULoxetine (CYMBALTA) 30 mg delayed release capsule, Take 30 mg by mouth daily Pt unable to verify dose for RN (Patient not taking: Reported on 7/2/2024), Disp: , Rfl:   Review of Systems   Constitutional:  Negative for appetite change, fatigue and unexpected weight change.   HENT:  Negative for trouble swallowing and voice change.    Cardiovascular:  Negative for chest pain and palpitations.   Gastrointestinal:  Negative for abdominal pain, constipation, diarrhea, nausea and vomiting.   Endocrine: Negative for cold intolerance, heat intolerance, polydipsia, polyphagia and polyuria.       Physical Exam:  Body mass index is 42.78 kg/m².  /68 (BP Location: Right arm, Patient Position: Sitting, Cuff Size: Adult)   Pulse 89   Temp 98.1 °F (36.7 °C)   Wt 113 kg (249 lb 3.2 oz)   SpO2 100%   BMI 42.78 kg/m²    Wt Readings from Last 3 Encounters:   07/02/24 113 kg (249 lb 3.2 oz)   06/20/24 114 kg (252 lb)   06/18/24 113 kg (248 lb 3.2 oz)       GEN: NAD  E/n/m nl facies,   Eyes: no stare or proptosis,   Neck: trachea midline, thyroid NT to palpation, nl in size, no nodules or neck masses noted,   CV; heart reg rate s1s2 nl, systolic  Murmur   Resp: CTAB, good effort  Neuro: no tremor,   Skin: warm and dry,   Ext:  no edema bilaterally,   Psych: nl mood and affect, no gross lapses in memory      Labs:   No components found for: \"HA1C\"  No components found for: \"GLU\"    Lab Results   Component Value Date    CREATININE 1.15 (H) 06/27/2024    CREATININE 0.75 06/18/2024    CREATININE 0.77 06/17/2024    BUN 21 06/27/2024    K 4.3 06/27/2024    CL 97 (L) 06/27/2024    CO2 27 06/27/2024     GFR, Calculated   Date Value Ref Range Status   11/26/2018 108 >60 mL/min/1.73m2 Final     Comment:     Please refer to initial GFR, CALC footnote     eGFRcr   Date Value Ref Range Status   05/08/2024 81 > OR = 60 mL/min/1.73m2 " "Final   07/02/2023 74 >59 Final     eGFR   Date Value Ref Range Status   06/27/2024 59 (L) > OR = 60 mL/min/1.73m2 Final   06/18/2024 95 ml/min/1.73sq m Final     No components found for: \"MALBCRER\"    Lab Results   Component Value Date    HDL 51 06/17/2024    TRIG 149 06/17/2024       Lab Results   Component Value Date    ALT 35 06/14/2024    AST 21 06/14/2024    ALKPHOS 81 06/14/2024       Lab Results   Component Value Date    TSH 1.40 10/04/2021       Impression:  1. Type 1 diabetes mellitus with other circulatory complication (HCC)    2. Essential hypertension    3. Mixed hyperlipidemia    4. Thyroid nodule    5. Hyperthyroidism           Plan:    Problem List Items Addressed This Visit          Cardiovascular and Mediastinum    Type 1 diabetes mellitus with other circulatory complication (HCC) - Primary       Lab Results   Component Value Date    HGBA1C 9.2 (H) 06/17/2024   Uncontrolled with A1c of 9.2%, however her glycemic control has improved on 780 Medtronic which time in range of 69% and minimal hypoglycemia.  She recently has started Ozempic 0.25 mg weekly which will be increased to 0.5 mg weekly after 4 weeks.  I made changes in her overnight basal rate and I decreased basal rate to 1.8 units/h at 3 AM (was decreased from 2.0 units/h).  we will maintain rest of the setting.  Return back in 3 months.  Labs prior to next visit.           Relevant Orders    Hemoglobin A1C    Comprehensive metabolic panel    Lipid Panel with Direct LDL reflex    Vitamin D 25 hydroxy    Albumin / creatinine urine ratio    Essential hypertension     Blood pressure is controlled, 130/68, currently on lisinopril and Aldactone which will be maintained.           Relevant Orders    Hemoglobin A1C    Comprehensive metabolic panel    Lipid Panel with Direct LDL reflex    Albumin / creatinine urine ratio       Endocrine    Hyperthyroidism     History of question Graves' disease, was on methimazole which stopped in 2022, she is " currently clinically and biochemically euthyroid.              Other    Hyperlipidemia     LDL goal less than 70, continue Lipitor 40 mg daily.         Relevant Orders    Hemoglobin A1C    Comprehensive metabolic panel    Lipid Panel with Direct LDL reflex    Albumin / creatinine urine ratio     Other Visit Diagnoses       Thyroid nodule        Relevant Orders    US thyroid                  Discussed with the patient and all questioned fully answered. She will call me if any problems arise.    Counseled patient on diagnostic results, prognosis, risk and benefit of treatment options, instruction for management, importance of treatment compliance, Risk  factor reduction and impressions      Adrienne Aguilera MD

## 2024-07-11 ENCOUNTER — OFFICE VISIT (OUTPATIENT)
Dept: CARDIOLOGY CLINIC | Facility: CLINIC | Age: 49
End: 2024-07-11
Payer: COMMERCIAL

## 2024-07-11 VITALS
HEART RATE: 81 BPM | SYSTOLIC BLOOD PRESSURE: 114 MMHG | WEIGHT: 249.9 LBS | OXYGEN SATURATION: 97 % | DIASTOLIC BLOOD PRESSURE: 60 MMHG | BODY MASS INDEX: 42.66 KG/M2 | HEIGHT: 64 IN

## 2024-07-11 DIAGNOSIS — I25.10 CORONARY ARTERY DISEASE INVOLVING NATIVE CORONARY ARTERY OF NATIVE HEART WITHOUT ANGINA PECTORIS: ICD-10-CM

## 2024-07-11 DIAGNOSIS — I50.32 CHRONIC HEART FAILURE WITH PRESERVED EJECTION FRACTION (HCC): Primary | ICD-10-CM

## 2024-07-11 DIAGNOSIS — I10 ESSENTIAL HYPERTENSION: ICD-10-CM

## 2024-07-11 PROCEDURE — 99214 OFFICE O/P EST MOD 30 MIN: CPT | Performed by: PHYSICIAN ASSISTANT

## 2024-07-11 RX ORDER — TORSEMIDE 20 MG/1
40 TABLET ORAL 2 TIMES DAILY
Qty: 360 TABLET | Refills: 1 | Status: SHIPPED | OUTPATIENT
Start: 2024-07-11 | End: 2024-10-09

## 2024-07-11 NOTE — PATIENT INSTRUCTIONS
Continue torsemide at 40 mg twice daily.   Can try Claritin.     Please weigh yourself every day (after emptying your bladder) and keep a detailed log of weights.   Contact the Heart Failure program at [phone] if you gain 3+ lbs overnight or 5+ lbs in 5-7 days.  Limit daily sodium/salt intake to 2000 mg daily to prevent fluid retention.  Avoid canned foods, fast food/Chinese food, and processed meats (hot dogs, lunch meat, and sausage etc.). Caution with condiments.  Limit fluid intake to 2000 mL or 2 liters (about 60-65 ounces) daily.  Avoid electrolyte replacement drinks (such as Gatorade, Pedialyte, Propel, Liquid IV, etc.).  Bring complete list of medications and log of daily weights to your follow-up appointment.

## 2024-07-11 NOTE — PROGRESS NOTES
General Cardiology Outpatient Progress Note    Naina Jamison 49 y.o. female   MRN: [id number]  Encounter: [id number]    Assessment:  Patient Active Problem List    Diagnosis Date Noted    Chronic diastolic heart failure (HCC) 06/18/2024    SIRS (systemic inflammatory response syndrome) ([facility]) 06/14/2024    Bicuspid aortic valve 03/06/2024    Graves disease     Coronary artery disease involving native coronary artery of native heart without angina pectoris 08/25/2023    Type 1 diabetes mellitus with other circulatory complication ([facility]) 07/04/2023    Essential hypertension 07/04/2023    Fibromyalgia 07/04/2023    Morbid obesity 07/04/2023    Hyperlipidemia 07/04/2023    Anxiety 07/04/2023    Tick bites/exposure 07/04/2023    Hyperthyroidism 04/19/2018    Sleep apnea 10/29/2010    Esophageal reflux 06/28/2010       Today's Plan:  Continue torsemide 40 mg BID. Volume up on exam today. Await BMP results to determine further diuretic dosing.  Addendum: Reviewed BMP results from 07/09; creatinine 1.19. Contacted patient via telephone PM of 07/11 and advised okay to try increasing total daily torsemide dose by 20-40 mg for a few days for additional diuresis.  Plavix therapy completed. Removed from medication list.   Refill sent to pharmacy as requested.  Timing of next TTE per Dr. Newell.  Advised to follow-up with PCP regarding ear fullness +/- cerumen impaction.    Plan:  Chronic HFpEF; LVEF 60%   TTE 04/25/2024: LVEF 60%. LVIDd 4.6 cm. Normal RV. Moderate AS.     Weight of 248 lbs on 06/18 (day of discharge) Today, weighs 249 lbs.    Most recent BMP from 06/27/2024: sodium 136; potassium 4.3; BUN 21; creatinine 1.15; eGFR 59.     Pharmacotherapies:  --Aldosterone Antagonist: spironolactone 25 mg daily.    --SGLT2 Inhibitor: No, type 1 DM.   --Diuretic: torsemide 40 mg BID.    Coronary artery disease   Without active chest pain.   [facility] 07/05/2023: 95% stenosis mid LAD (received PCI with KSENIA x1).   [facility] 06/17/2024:  "\"Stable CAD with patent prior stent and without new progressive CAD findings.\"   Continue on aspirin and statin. PRN SL nitro prescribed.   BB stopped in March 2024 due to fatigue per prior notes.     Hypertension   BP of 114/60 mmHg in office today.   Continues on lisinopril 10 mg daily and medications as above.    Bicuspid aortic valve with moderate stenosis  Hyperlipidemia   Diabetes mellitus, type I  Sleep apnea  Grave's disease    HPI:   Naina Jamison is a 49-year-old woman with a PMH as above who presents to the office for follow-up. Follows with Dr. Newell.     06/20/2024 with CW: \"She continues to improve, she still has some overall generalized edema but it is significantly better than before.  She did not weigh herself yesterday, as she was \"delirious\" after the hospital course, and was just getting reacclimated to home..  Her weight this morning and very little clothing was 252 pounds.  She is abiding by a salt restricted diet.  She will be receiving some information from her insurance company about a salt restricted diet.  She has been educated about the importance of a salt restricted diet, and daily weights along with the fluid restriction she reports  She is currently on torsemide 40 mg daily as well as spironolactone 25 mg daily in the morning  She is amenable to labs next Monday on the 24th.  I will order a nonfasting BMP and BNP.  I will ask staff to fax it to [facility] where she will be going based on her insurance request.  She asked what brought her into the acute heart failure.  It is not clear.  I am not certain whether there is potentially some sodium dietary indiscretion.  She does have moderate aortic stenosis.\"  Torsemide increased to 40 mg BID.     07/11/2024: Patient presents for follow-up. Primary complaint today of shortness of breath primarily at rest and when lying down. Denies SCHAFFER and PND. Also has been experiencing worsening dizziness that is primarily occurring when lying down. " "Reports having history of cerumen impaction that resulted in severe dizziness and she is plan to follow-up with her PCP regarding this. Denies near-syncope and syncope. Since her telemedicine visit with cardiology last month, patient has continued on torsemide 40 mg BID and reports that weights on her home scale have been holding steady at this current dose. Does feel that she is \"puffy\" in her upper extremities which is where she has retained fluid in the past.  Recently started on Ozempic.    Past Medical History:   Diagnosis Date    Diabetes mellitus (HCC)     Graves disease     High cholesterol     Hypertension      Review of Systems   Constitutional:  Negative for activity change, appetite change, fatigue and unexpected weight change.   Respiratory:  Positive for shortness of breath. Negative for cough and chest tightness.    Cardiovascular:  Positive for leg swelling. Negative for chest pain and palpitations.   Gastrointestinal:  Negative for abdominal distention and abdominal pain.   Genitourinary:  Negative for decreased urine volume, dysuria and urgency.   Musculoskeletal: Negative.    Skin: Negative.    Neurological:  Positive for dizziness. Negative for syncope, weakness and light-headedness.   Psychiatric/Behavioral:  Positive for sleep disturbance. Negative for confusion. The patient is not nervous/anxious.      Allergies   Allergen Reactions    Ticagrelor Anaphylaxis     Made throat feel like it was closing    Milnacipran GI Intolerance    Liraglutide GI Intolerance     Heartburn, nausea.    Oxycodone-Acetaminophen Vomiting and GI Intolerance    Pregabalin Other (See Comments)     Didn't help         Current Outpatient Medications:     ALPRAZolam (XANAX) 1 mg tablet, Take 1 mg by mouth daily at bedtime as needed for anxiety Pt unable to verify dose for RN, Disp: , Rfl:     aspirin (ECOTRIN LOW STRENGTH) 81 mg EC tablet, Take 1 tablet (81 mg total) by mouth daily Do not start before July 7, 2023., Disp: " , Rfl: 0    atorvastatin (LIPITOR) 40 mg tablet, , Disp: , Rfl:     Continuous Blood Gluc Transmit (Guardian 4 Transmitter) MISC, To check blood sugars continuously, Disp: 1 each, Rfl: 0    HYDROcodone-acetaminophen (NORCO) 5-325 mg per tablet, Take 1 tablet by mouth every 6 (six) hours as needed for pain Pt unable to verify dose for RN, Disp: , Rfl:     lisinopril (ZESTRIL) 10 mg tablet, Take 1 tablet (10 mg total) by mouth daily, Disp: 90 tablet, Rfl: 3    nitroglycerin (NITROSTAT) 0.4 mg SL tablet, Place 1 tablet (0.4 mg total) under the tongue every 5 (five) minutes as needed for chest pain, Disp: 30 tablet, Rfl: 2    NovoLOG 100 UNIT/ML injection, To be used in insulin pump approximately 120 units a day, Disp: 110 mL, Rfl: 1    PATIENT MAINTAINED INSULIN PUMP, Inject 1 each under the skin every 8 (eight) hours, Disp: , Rfl:     semaglutide, 0.25 or 0.5 mg/dose, (Ozempic, 0.25 or 0.5 MG/DOSE,) 2 mg/3 mL injection pen, Inject 0.375 mL (0.25 mg total) under the skin every 7 days for 30 days, THEN 0.75 mL (0.5 mg total) every 7 days., Disp: 9 mL, Rfl: 0    spironolactone (ALDACTONE) 25 mg tablet, Take 1 tablet (25 mg total) by mouth daily, Disp: 30 tablet, Rfl: 11    torsemide (DEMADEX) 20 mg tablet, Take 2 tablets (40 mg total) by mouth daily, Disp: 60 tablet, Rfl: 0    cholecalciferol (VITAMIN D3) 1,000 units tablet, Take 2,000 Units by mouth daily Pt unable to verify dose for RN (Patient not taking: Reported on 7/2/2024), Disp: , Rfl:     clopidogrel (Plavix) 75 mg tablet, Take 1 tablet (75 mg total) by mouth daily (Patient not taking: Reported on 7/2/2024), Disp: 90 tablet, Rfl: 1    DULoxetine (CYMBALTA) 30 mg delayed release capsule, Take 30 mg by mouth daily Pt unable to verify dose for RN (Patient not taking: Reported on 7/2/2024), Disp: , Rfl:     Social History     Socioeconomic History    Marital status: /Civil Union     Spouse name: Not on file    Number of children: Not on file    Years of  "education: Not on file    Highest education level: Not on file   Occupational History    Not on file   Tobacco Use    Smoking status: Never    Smokeless tobacco: Never   Vaping Use    Vaping status: Never Used   Substance and Sexual Activity    Alcohol use: Never    Drug use: Never    Sexual activity: Not Currently   Other Topics Concern    Not on file   Social History Narrative    Not on file     Social Determinants of Health     Financial Resource Strain: Not on file   Food Insecurity: No Food Insecurity (6/15/2024)    Hunger Vital Sign     Worried About Running Out of Food in the Last Year: Never true     Ran Out of Food in the Last Year: Never true   Transportation Needs: No Transportation Needs (6/15/2024)    PRAPARE - Transportation     Lack of Transportation (Medical): No     Lack of Transportation (Non-Medical): No   Physical Activity: Not on file   Stress: Not on file   Social Connections: Not on file   Intimate Partner Violence: Not on file   Housing Stability: Low Risk  (6/15/2024)    Housing Stability Vital Sign     Unable to Pay for Housing in the Last Year: No     Number of Times Moved in the Last Year: 0     Homeless in the Last Year: No     No family history on file.    Vitals:   Blood pressure 114/60, pulse 81, height 5' 4\" (1.626 m), weight 113 kg (249 lb 14.4 oz), SpO2 97%.    Wt Readings from Last 10 Encounters:   07/11/24 113 kg (249 lb 14.4 oz)   07/02/24 113 kg (249 lb 3.2 oz)   06/20/24 114 kg (252 lb)   06/18/24 113 kg (248 lb 3.2 oz)   06/13/24 119 kg (263 lb 3.2 oz)   05/22/24 118 kg (260 lb 3.2 oz)   04/25/24 116 kg (256 lb)   04/18/24 116 kg (256 lb 11.2 oz)   04/11/24 114 kg (251 lb)   03/06/24 114 kg (251 lb)     Vitals:    07/11/24 1143   BP: 114/60   BP Location: Left arm   Patient Position: Sitting   Cuff Size: Large   Pulse: 81   SpO2: 97%   Weight: 113 kg (249 lb 14.4 oz)   Height: 5' 4\" (1.626 m)       Physical Exam  Vitals reviewed.   Constitutional:       General: She is awake. " She is not in acute distress.     Appearance: Normal appearance. She is well-developed and overweight. She is not ill-appearing, toxic-appearing or diaphoretic.   HENT:      Head: Normocephalic.      Nose: Nose normal.      Mouth/Throat:      Mouth: Mucous membranes are moist.   Eyes:      General: No scleral icterus.     Conjunctiva/sclera: Conjunctivae normal.   Neck:      Vascular: JVD present.      Trachea: No tracheal deviation.   Cardiovascular:      Rate and Rhythm: Normal rate and regular rhythm.      Heart sounds: Murmur heard.   Pulmonary:      Effort: Pulmonary effort is normal. No tachypnea, bradypnea or respiratory distress.      Breath sounds: Normal air entry. No decreased air movement. No decreased breath sounds or wheezing.   Abdominal:      General: There is distension.      Palpations: Abdomen is soft.      Tenderness: There is no abdominal tenderness.   Musculoskeletal:      Cervical back: Neck supple.      Right lower leg: No edema.      Left lower leg: No edema.   Skin:     General: Skin is warm and dry.      Coloration: Skin is not jaundiced or pale.   Neurological:      General: No focal deficit present.      Mental Status: She is alert and oriented to person, place, and time.   Psychiatric:         Attention and Perception: Attention normal.         Mood and Affect: Affect normal. Mood is anxious.         Speech: Speech normal.         Behavior: Behavior normal. Behavior is cooperative.         Thought Content: Thought content normal.       Labs & Results:  Lab Results   Component Value Date    WBC 11.20 (H) 06/15/2024    HGB 13.4 06/15/2024    HCT 40.0 06/15/2024    MCV 88 06/15/2024     06/15/2024     Lab Results   Component Value Date    SODIUM 136 06/27/2024    K 4.3 06/27/2024    CL 97 (L) 06/27/2024    CO2 27 06/27/2024    BUN 21 06/27/2024    CREATININE 1.15 (H) 06/27/2024    GLUC 258 (H) 06/27/2024    CALCIUM 9.1 06/27/2024     Lab Results   Component Value Date    INR 1.11  07/04/2023    PROTIME 14.5 07/04/2023      Lab Results   Component Value Date    BNP 13 06/27/2024      Kriss Medrano PA-C

## 2024-07-23 ENCOUNTER — HOSPITAL ENCOUNTER (OUTPATIENT)
Dept: ULTRASOUND IMAGING | Facility: HOSPITAL | Age: 49
Discharge: HOME | End: 2024-07-23
Attending: STUDENT IN AN ORGANIZED HEALTH CARE EDUCATION/TRAINING PROGRAM
Payer: COMMERCIAL

## 2024-07-23 DIAGNOSIS — E04.1 THYROID NODULE: ICD-10-CM

## 2024-07-23 PROCEDURE — 76536 US EXAM OF HEAD AND NECK: CPT

## 2024-07-25 ENCOUNTER — TELEPHONE (OUTPATIENT)
Dept: ENDOCRINOLOGY | Facility: CLINIC | Age: 49
End: 2024-07-25

## 2024-07-25 NOTE — TELEPHONE ENCOUNTER
----- Message from Adrienne Aguilera MD sent at 7/24/2024  5:05 PM EDT -----  Michele Chew.  Your thyroid ultrasound showed 2 thyroid nodules, which does not need biopsy although we need to do another ultrasound in a year.

## 2024-07-30 ENCOUNTER — TELEPHONE (OUTPATIENT)
Dept: CARDIOLOGY CLINIC | Facility: CLINIC | Age: 49
End: 2024-07-30

## 2024-07-30 NOTE — TELEPHONE ENCOUNTER
Called pt , she is no longer on Lisinopril- HCTZ.  She just takes Lisinopril 10 mg .    Called [facility] and ask them to take that medication off her list . We keep getting refill request for this medication she is not taking. They advised they will take it off the list.

## 2024-08-16 DIAGNOSIS — R11.2 NAUSEA AND VOMITING, UNSPECIFIED VOMITING TYPE: Primary | ICD-10-CM

## 2024-08-16 RX ORDER — ONDANSETRON 4 MG/1
4 TABLET, FILM COATED ORAL EVERY 8 HOURS PRN
Qty: 20 TABLET | Refills: 0 | Status: SHIPPED | OUTPATIENT
Start: 2024-08-16

## 2024-08-20 ENCOUNTER — APPOINTMENT (OUTPATIENT)
Dept: URGENT CARE | Facility: CLINIC | Age: 49
End: 2024-08-20

## 2024-09-30 DIAGNOSIS — E10.59 TYPE 1 DIABETES MELLITUS WITH OTHER CIRCULATORY COMPLICATION (HCC): Primary | ICD-10-CM

## 2024-09-30 DIAGNOSIS — E78.2 MIXED HYPERLIPIDEMIA: ICD-10-CM

## 2024-09-30 DIAGNOSIS — I10 ESSENTIAL HYPERTENSION: ICD-10-CM

## 2024-09-30 DIAGNOSIS — E05.90 HYPERTHYROIDISM: ICD-10-CM

## 2024-10-17 ENCOUNTER — OFFICE VISIT (OUTPATIENT)
Dept: ENDOCRINOLOGY | Facility: CLINIC | Age: 49
End: 2024-10-17
Payer: COMMERCIAL

## 2024-10-17 VITALS
TEMPERATURE: 98.1 F | HEART RATE: 88 BPM | OXYGEN SATURATION: 97 % | HEIGHT: 64 IN | SYSTOLIC BLOOD PRESSURE: 112 MMHG | DIASTOLIC BLOOD PRESSURE: 66 MMHG | BODY MASS INDEX: 41.66 KG/M2 | WEIGHT: 244 LBS

## 2024-10-17 DIAGNOSIS — E10.9 TYPE 1 DIABETES MELLITUS WITHOUT COMPLICATION (HCC): ICD-10-CM

## 2024-10-17 DIAGNOSIS — E10.59 TYPE 1 DIABETES MELLITUS WITH OTHER CIRCULATORY COMPLICATION (HCC): Primary | ICD-10-CM

## 2024-10-17 DIAGNOSIS — E66.01 CLASS 3 SEVERE OBESITY DUE TO EXCESS CALORIES WITH SERIOUS COMORBIDITY AND BODY MASS INDEX (BMI) OF 40.0 TO 44.9 IN ADULT (HCC): ICD-10-CM

## 2024-10-17 DIAGNOSIS — E66.813 CLASS 3 SEVERE OBESITY DUE TO EXCESS CALORIES WITH SERIOUS COMORBIDITY AND BODY MASS INDEX (BMI) OF 40.0 TO 44.9 IN ADULT (HCC): ICD-10-CM

## 2024-10-17 LAB — SL AMB POCT HEMOGLOBIN AIC: 8.2 (ref ?–6.5)

## 2024-10-17 PROCEDURE — 99214 OFFICE O/P EST MOD 30 MIN: CPT | Performed by: STUDENT IN AN ORGANIZED HEALTH CARE EDUCATION/TRAINING PROGRAM

## 2024-10-17 PROCEDURE — 95251 CONT GLUC MNTR ANALYSIS I&R: CPT | Performed by: STUDENT IN AN ORGANIZED HEALTH CARE EDUCATION/TRAINING PROGRAM

## 2024-10-17 PROCEDURE — 83036 HEMOGLOBIN GLYCOSYLATED A1C: CPT | Performed by: STUDENT IN AN ORGANIZED HEALTH CARE EDUCATION/TRAINING PROGRAM

## 2024-10-17 RX ORDER — INSULIN ASPART 100 [IU]/ML
INJECTION, SOLUTION INTRAVENOUS; SUBCUTANEOUS
Qty: 110 ML | Refills: 3 | Status: SHIPPED | OUTPATIENT
Start: 2024-10-17

## 2024-10-17 RX ORDER — TIRZEPATIDE 2.5 MG/.5ML
2.5 INJECTION, SOLUTION SUBCUTANEOUS WEEKLY
Qty: 2 ML | Refills: 0 | Status: SHIPPED | OUTPATIENT
Start: 2024-10-17 | End: 2024-11-14

## 2024-10-17 NOTE — ASSESSMENT & PLAN NOTE
Lab Results   Component Value Date    HGBA1C 8.2 (A) 10/17/2024     Diabetes remains suboptimally controlled although glycemic control has improved significantly with time in range of 76%.  Therefore, we will continue insulin pump with current setting.  She was advised to insert right amount of carbs before she eats.  Hypoglycemia symptoms and treatment reviewed.   In case of pump malfunction, Lantus 50 units daily.  Pulmonology and podiatry follow-up  Return back in 3 months with  Labs prior to next visit.    Orders:    POCT hemoglobin A1c

## 2024-10-17 NOTE — PROGRESS NOTES
Ambulatory Visit  Name: Naina Jamison      : 1975      MRN: [id number]  Encounter Provider: Adrienne Aguilera MD  Encounter Date: 10/17/2024   Encounter department: [facility] [place]    Assessment & Plan  Type 1 diabetes mellitus with other circulatory complication ([facility])    Lab Results   Component Value Date    HGBA1C 8.2 (A) 10/17/2024     Diabetes remains suboptimally controlled although glycemic control has improved significantly with time in range of 76%.  Therefore, we will continue insulin pump with current setting.  She was advised to insert right amount of carbs before she eats.  Hypoglycemia symptoms and treatment reviewed.   In case of pump malfunction, Lantus 50 units daily.  Pulmonology and podiatry follow-up  Return back in 3 months with  Labs prior to next visit.    Orders:    POCT hemoglobin A1c    Class 3 severe obesity due to excess calories with serious comorbidity and body mass index (BMI) of 40.0 to 44.9 in adult ([facility])  We did emphasize importance of maintaining effort to change lifestyle including regular daily exercise and balanced diet.  She is interested to try GLP-1 GIP dual agonist.  Adverse reaction including GI side effects, MTC, pancreatitis, reviewed.  Zepbound 2.5 mg weekly started which will be increased as she tolerates.    Orders:    tirzepatide (Zepbound) 2.5 mg/0.5 mL auto-injector; Inject 0.5 mL (2.5 mg total) under the skin once a week for 28 days      History of Present Illness     Naina Jamison is a 49 y.o. female who presents for follow up for diabetes.    Patient is on a [employer] 780 pump prescribed by Endocrinology.  She has been on this pump for 1 years.   She denies any malfunctioning of the pump.       Current Insulin pump settings:  Basal rate:  12 AM:2.2 units/h  3 AM : 1.8 units/h  7 AM: 3.3 units/h  3 PM: 3.5 units/h  10 PM: 2.8 units/h     Insulin to carb ratio:  12 AM: 5  6 AM: 2.5  10 PM: 5  Insulin  sensitivity factor:  12 AM: 16  6 AM: 10  23:00 16     Type of insulin:Novolog  Active Insulin Time: 2 hr    Naina Shaheen   Device used,guardian 4  Home use       Indication   Type 1 Diabetes      More than 72 hours of data was reviewed. Report to be scanned to chart.     Date Range: oct 4 - 17th    Analysis of data:   Average Glucose: 152 mg/dl  Coefficient of Variation: 36.5%   SD : 56 mg/dl   Time in Target Range: 76%   Time Above Range: 23%   Time Below Range: 1%        For hyperlipidemia on Lipitor 40 mg daily.  On lisinopril 10 mg daily.    Eye exam: Up-to-date  With exam: Up-to-date  History obtained from : patient  Review of Systems   Constitutional:  Positive for unexpected weight change. Negative for appetite change and fatigue.   Gastrointestinal:  Negative for constipation, nausea and vomiting.   Endocrine: Negative for cold intolerance, heat intolerance, polydipsia, polyphagia and polyuria.     Medical History Reviewed by provider this encounter:       Current Outpatient Medications on File Prior to Visit   Medication Sig Dispense Refill    ALPRAZolam (XANAX) 1 mg tablet Take 1 mg by mouth daily at bedtime as needed for anxiety Pt unable to verify dose for RN      aspirin (ECOTRIN LOW STRENGTH) 81 mg EC tablet Take 1 tablet (81 mg total) by mouth daily Do not start before July 7, 2023.  0    atorvastatin (LIPITOR) 40 mg tablet       Continuous Blood Gluc Transmit (Guardian 4 Transmitter) MISC To check blood sugars continuously 1 each 0    HYDROcodone-acetaminophen (NORCO) 5-325 mg per tablet Take 1 tablet by mouth every 6 (six) hours as needed for pain Pt unable to verify dose for RN      lisinopril (ZESTRIL) 10 mg tablet Take 1 tablet (10 mg total) by mouth daily 90 tablet 3    nitroglycerin (NITROSTAT) 0.4 mg SL tablet Place 1 tablet (0.4 mg total) under the tongue every 5 (five) minutes as needed for chest pain 30 tablet 2    ondansetron (ZOFRAN) 4 mg tablet Take 1 tablet (4 mg total) by mouth  "every 8 (eight) hours as needed for nausea or vomiting 20 tablet 0    PATIENT MAINTAINED INSULIN PUMP Inject 1 each under the skin every 8 (eight) hours      spironolactone (ALDACTONE) 25 mg tablet Take 1 tablet (25 mg total) by mouth daily 30 tablet 11    torsemide (DEMADEX) 20 mg tablet Take 2 tablets (40 mg total) by mouth 2 (two) times a day 360 tablet 1    cholecalciferol (VITAMIN D3) 1,000 units tablet Take 2,000 Units by mouth daily Pt unable to verify dose for RN (Patient not taking: Reported on 7/2/2024)      DULoxetine (CYMBALTA) 30 mg delayed release capsule Take 30 mg by mouth daily Pt unable to verify dose for RN (Patient not taking: Reported on 7/2/2024)       No current facility-administered medications on file prior to visit.          Objective     /66 (BP Location: Left arm, Patient Position: Sitting, Cuff Size: Large)   Pulse 88   Temp 98.1 °F (36.7 °C) (Tympanic)   Ht 5' 4\" (1.626 m)   Wt 111 kg (244 lb)   SpO2 97%   BMI 41.88 kg/m²     Physical Exam  Vitals and nursing note reviewed.   Constitutional:       General: She is not in acute distress.     Appearance: She is well-developed.   HENT:      Head: Normocephalic and atraumatic.   Cardiovascular:      Rate and Rhythm: Normal rate and regular rhythm.      Heart sounds: Murmur heard.   Pulmonary:      Effort: Pulmonary effort is normal. No respiratory distress.   Abdominal:      Palpations: Abdomen is soft.   Musculoskeletal:         General: No swelling.      Cervical back: Neck supple.   Skin:     General: Skin is warm and dry.   Neurological:      Mental Status: She is alert.       Component      Latest Ref Rng 10/17/2024   Hemoglobin A1C      6.5  8.2 !       Legend:  ! Abnormal  "

## 2024-10-18 ENCOUNTER — OFFICE VISIT (OUTPATIENT)
Dept: CARDIOLOGY CLINIC | Facility: CLINIC | Age: 49
End: 2024-10-18
Payer: COMMERCIAL

## 2024-10-18 VITALS
BODY MASS INDEX: 42 KG/M2 | SYSTOLIC BLOOD PRESSURE: 124 MMHG | HEIGHT: 64 IN | HEART RATE: 90 BPM | OXYGEN SATURATION: 97 % | DIASTOLIC BLOOD PRESSURE: 72 MMHG | WEIGHT: 246 LBS

## 2024-10-18 DIAGNOSIS — Q23.81 BICUSPID AORTIC VALVE: Primary | ICD-10-CM

## 2024-10-18 DIAGNOSIS — I50.32 CHRONIC HEART FAILURE WITH PRESERVED EJECTION FRACTION (HCC): ICD-10-CM

## 2024-10-18 PROCEDURE — 99214 OFFICE O/P EST MOD 30 MIN: CPT | Performed by: INTERNAL MEDICINE

## 2024-10-18 RX ORDER — TORSEMIDE 20 MG/1
40 TABLET ORAL DAILY
Qty: 180 TABLET | Refills: 3 | Status: SHIPPED | OUTPATIENT
Start: 2024-10-18

## 2024-10-18 NOTE — PROGRESS NOTES
Cardiology Outpatient Follow-Up Note - Naina Jamison 49 y.o. female MRN: [id number]      Assessment/Plan:    1. Chronic heart failure with preserved ejection fraction (HCC)  Her dry weight is in mid 240s lbs. She is euvolemic today. We will continue torsemide 40 mg daily and spironolactone 25 mg daily.   - torsemide (DEMADEX) 20 mg tablet; Take 2 tablets (40 mg total) by mouth daily  Dispense: 180 tablet; Refill: 3    2. Bicuspid aortic valve  She has moderate stenosis by echo April 2024. Recheck echo in April 2025.   - Echo complete w/ contrast if indicated; Future      We will see Naina Jamison back in  6 months  for follow-up.    Subjective:     HPI: Naina Jamison is a 49 y.o. year old female with morbid obesity, T1DM, HLD, and premature CAD with NSTEMI and PCI to LAD in July 2023 presenting for follow-up.     She presented with atypical symptoms SLB and found to have elevated troponin > 4000. She underwent cardiac cath 7/5/23 which showed a mid-LAD 95% culprit lesion which was stented (Xience Skypoint 3 x 33 mm KSENIA). Echo showed normal LV function and mild-moderate AS. We followed up with DARRICK on 9/7/23 which confirmed congenitally bicuspid AV.     Since last seen by me she was admitted to [facility] and required IV diuretics. Her oral diuretics were torsemide 40 mg BID on discharge but she reduced to 40 mg daily since then. She also takes spironolactone 25 mg daily. Her weight has been stable on this regimen.      She has persistent fatigue, some left sided abdominal/chest cramping, and nightly leg cramping  Denies orthopnea or PND    Cardiac Testing:    Cardiac Cath 6/17/24  Impression         Stable CAD with patent prior stent and without new progressive CAD findings    LVEDP is normal without gradient on LV-AO pullback     Recommendation    Recommendation   Cont GDMT optimization for CAD  Cont risk factor reduction   on diet/lifestyle modification, referral to  Weight Mgmt  program if amenable  Cardiac rehab upon discharge (complete all 36 sessions)        Holter 4/25/24     IMPRESSION:     Predominantly normal sinus during the study period with an average HR of 78 bpm ( bpm).  Rare supraventricular and ventricular ectopy burden, mostly in the form of single PACs.   Patient diary was attached. None of the reported symptoms correlated with any significant arrhythmias.             Echo 4/25/24    Interpretation Summary  Show Result Comparison     Left Ventricle: Left ventricular cavity size is normal. Wall thickness is normal. The left ventricular ejection fraction is 60%. Systolic function is normal. Although no diagnostic regional wall motion abnormality was identified, this possibility cannot be completely excluded on the basis of this study. Diastolic function is normal.    Aortic Valve: There is moderate stenosis. The aortic valve peak velocity is 2.72 m/s. The aortic valve peak gradient is 30 mmHg. The aortic valve mean gradient is 16 mmHg. The dimensionless velocity index is 0.38. The aortic valve area is 1.51 cm2.    Technically difficult study.  Definity could not be given due to inability to obtain IV access.          DARRICK 3D 9/7/23    Interpretation Summary         Left Ventricle: Left ventricular cavity size is normal. Wall thickness is normal. The left ventricular ejection fraction is 60%. Systolic function is normal. Wall motion is normal.    Left Atrium: The atrium is dilated. There is no thrombus.    Atrial Septum: There is no atrial septal defect. No patent foramen ovale detected, confirmed at rest using color doppler.    Aortic Valve: The aortic valve is congenitally bicuspid. The leaflets are moderately calcified. There is mildly reduced mobility. There is systolic doming. There is moderate stenosis. The aortic valve peak velocity is 2.5 m/s. The aortic valve peak gradient is 24.0 mmHg. The aortic valve mean gradient is 15.0 mmHg. The dimensionless velocity index  is 0.32. The aortic valve area is 1.08 cm2 by continuity VTI, and approximately 1.1 to 1.3 cm2 by planimetry. The stroke volume index is 30.40 ml/m2, suggestive of mildly decreased flow.    Aorta: The aortic root is normal in size. The ascending aorta is normal in size. The ascending aorta is 2.8 cm.     Bicuspid aortic valve anatomy demonstrated with leaflet calcification, a moderate degree of valvular stenosis, and minimal regurgitation. Aortic root and ascending aorta are normal in size.             Cardiac Cath 7/5/23   Impression         Mid LAD lesion is 95% stenosed. Culprit of NSTEMI. S/P successful IVUS-guided PCI to the mid LAD with KSENIA x 1. Mild reisdual CAD amenable to GDMT.    LVEDP normal without gradient on LV-AO pullback     Recommendation    Cont GDMT optimization for CAD  DAPT for at least 12 months  Aggressive risk factor reduction   on diet/lifestyle modification  Cardiac rehab upon discharge       Echo 7/5/23 Interpretation Summary         Left Ventricle: Left ventricular cavity size is normal. Wall thickness is normal. The left ventricular ejection fraction is 65%. Systolic function is normal. Diastolic function is normal.    The following segments are hypokinetic: apical septal, apical inferior and apex.    All other segments are normal.    Aortic Valve: The aortic valve is possibly bicuspid. The leaflets are not thickened. The leaflets are not calcified. The leaflets exhibit normal mobility. There is mild to moderate stenosis. The aortic valve mean gradient is 17 mmHg. The dimensionless velocity index is 0.41. The aortic valve area is 1.60 cm2.          EKGs, personally reviewed:  6/10/24 - NSR, 75 bpm, normal study     Relevant Labs & Results:  CMP 7/26/23 -- reviewed, K 4.2, Cr 1.02  CBC 7/26/23 -- WNL  Lipid profile 7/05/23 -- LDL 33 mg/dL  HbA1c 7/5/23 -- 9.5%    CMP 5/8/24 - Cr 0.89, K 4.4  BNP 5/8/24 - 22  HbA1c 5/8/24 - 11%  Lipid panel 5/8/24 - LDL 49 mg/dL   Troponin 6/10/24  "negative x3  BMP 6/10/24 - K 3.8, Cr 0.96  CBC 6/10/24 - Hgb 14.9 g/dL    ROS:  Review of Systems:  Review of Systems    Objective:     Vitals:   Vitals:    10/18/24 1540   BP: 124/72   BP Location: Right arm   Patient Position: Sitting   Cuff Size: Large   Pulse: 90   SpO2: 97%   Weight: 112 kg (246 lb)   Height: 5' 4\" (1.626 m)      Body surface area is 2.14 meters squared.  Wt Readings from Last 3 Encounters:   10/18/24 112 kg (246 lb)   10/17/24 111 kg (244 lb)   07/11/24 113 kg (249 lb 14.4 oz)       Physical Exam:    General: Naina Jamison is a well appearing female, in no acute distress, sitting comfortably  HEENT: moist mucous membranes, EOMI  Neck:  No JVD, supple, trachea midline  Cardiovascular: unremarkable S1/S2, regular rate and rhythm, no murmurs, rubs or gallops  Pulmonary: normal respiratory effort, CTAB  Abdomen: soft and nondistended  Extremities: trace lower extremity edema. Warm and well perfused extremities.  Neuro: no focal motor deficits, AAOx3 (person, place, time)  Psych: Normal mood and affect, cooperative        Medications (at the START of this encounter):  Outpatient Medications Prior to Visit   Medication Sig Dispense Refill    ALPRAZolam (XANAX) 1 mg tablet Take 1 mg by mouth daily at bedtime as needed for anxiety Pt unable to verify dose for RN      aspirin (ECOTRIN LOW STRENGTH) 81 mg EC tablet Take 1 tablet (81 mg total) by mouth daily Do not start before July 7, 2023.  0    atorvastatin (LIPITOR) 40 mg tablet       HYDROcodone-acetaminophen (NORCO) 5-325 mg per tablet Take 1 tablet by mouth every 6 (six) hours as needed for pain Pt unable to verify dose for RN      Insulin Aspart (NovoLOG) 100 units/mL injection To be used in insulin pump approximately 120 units a day 110 mL 3    lisinopril (ZESTRIL) 10 mg tablet Take 1 tablet (10 mg total) by mouth daily 90 tablet 3    nitroglycerin (NITROSTAT) 0.4 mg SL tablet Place 1 tablet (0.4 mg total) under the tongue every 5 (five) " "minutes as needed for chest pain 30 tablet 2    PATIENT MAINTAINED INSULIN PUMP Inject 1 each under the skin every 8 (eight) hours      spironolactone (ALDACTONE) 25 mg tablet Take 1 tablet (25 mg total) by mouth daily 30 tablet 11    torsemide (DEMADEX) 20 mg tablet Take 2 tablets (40 mg total) by mouth 2 (two) times a day (Patient taking differently: Take 40 mg by mouth 2 (two) times a day Usually takes 2 tablets once a day) 360 tablet 1    cholecalciferol (VITAMIN D3) 1,000 units tablet Take 2,000 Units by mouth daily Pt unable to verify dose for RN (Patient not taking: Reported on 7/2/2024)      Continuous Blood Gluc Transmit (Guardian 4 Transmitter) MISC To check blood sugars continuously (Patient not taking: Reported on 10/18/2024) 1 each 0    DULoxetine (CYMBALTA) 30 mg delayed release capsule Take 30 mg by mouth daily Pt unable to verify dose for RN (Patient not taking: Reported on 7/2/2024)      ondansetron (ZOFRAN) 4 mg tablet Take 1 tablet (4 mg total) by mouth every 8 (eight) hours as needed for nausea or vomiting (Patient not taking: Reported on 10/18/2024) 20 tablet 0    tirzepatide (Zepbound) 2.5 mg/0.5 mL auto-injector Inject 0.5 mL (2.5 mg total) under the skin once a week for 28 days (Patient not taking: Reported on 10/18/2024) 2 mL 0     No facility-administered medications prior to visit.         This note was completed in part utilizing Dragon [employer] One voice recognition software. Grammatical errors, random word insertion, spelling mistakes, occasional wrong word or \"sound-alike\" substitutions and incomplete sentences may be an occasional consequence of the system secondary to software limitations, ambient noise and hardware issues. At the time of dictation, efforts were made to edit, clarify and /or correct errors.  Please read the chart carefully and recognize, using context, where substitutions have occurred.  If you have any questions or concerns about the context, text or information " contained within the body of this dictation, please contact myself, the provider, for further clarification.

## 2024-10-21 LAB
CREAT ?TM UR-SCNC: 124 UMOL/L
EXT ALBUMIN URINE RANDOM: 0.3
MICROALBUMIN/CREAT UR: 2 MG/G{CREAT}

## 2024-10-22 ENCOUNTER — TELEPHONE (OUTPATIENT)
Age: 49
End: 2024-10-22

## 2024-10-22 NOTE — TELEPHONE ENCOUNTER
PA for tirzepatide (Zepbound) 2.5 mg/0.5 mL auto-injector SUBMITTED     via      [x][employer]-Case ID # [id number]      Office notes sent, clinical questions answered. Awaiting determination    Turnaround time for your insurance to make a decision on your Prior Authorization can take 7-21 business days.

## 2024-10-24 NOTE — TELEPHONE ENCOUNTER
PA for tirzepatide (Zepbound) 2.5 mg/0.5 mL auto-injector  EXCLUDED from plan       Reason:        Message sent to office clinical pool Yes

## 2024-10-28 ENCOUNTER — TELEPHONE (OUTPATIENT)
Dept: ENDOCRINOLOGY | Facility: CLINIC | Age: 49
End: 2024-10-28

## 2024-12-23 ENCOUNTER — TELEPHONE (OUTPATIENT)
Age: 49
End: 2024-12-23

## 2024-12-23 DIAGNOSIS — E10.9 TYPE 1 DIABETES MELLITUS WITHOUT COMPLICATION (HCC): ICD-10-CM

## 2024-12-23 RX ORDER — INSULIN ASPART 100 [IU]/ML
INJECTION, SOLUTION INTRAVENOUS; SUBCUTANEOUS
Qty: 130 ML | Refills: 3 | Status: SHIPPED | OUTPATIENT
Start: 2024-12-23 | End: 2024-12-26

## 2024-12-23 NOTE — TELEPHONE ENCOUNTER
Patient  called she stated that  since  her pump  is auto correcting  she  is using  more  of her  insulin novolog  100  she needs a new script   for the  pharmacy because it want last until dec 26,24.

## 2024-12-24 ENCOUNTER — TELEPHONE (OUTPATIENT)
Age: 49
End: 2024-12-24

## 2024-12-24 NOTE — TELEPHONE ENCOUNTER
PA for  (NovoLOG) 100 units/mL injection[facility]    via    []CMM-KEY:   [x]Surescripts-Case ID # [id number]  []Availity-Auth ID # NDC #   []Faxed to plan   []Other website   []Phone call Case ID #     [x]PA sent as URGENT    All office notes, labs and other pertaining documents and studies sent. Clinical questions answered. Awaiting determination from insurance company.     Turnaround time for your insurance to make a decision on your Prior Authorization can take 7-21 business days.

## 2024-12-26 DIAGNOSIS — E10.59 TYPE 1 DIABETES MELLITUS WITH OTHER CIRCULATORY COMPLICATION (HCC): Primary | ICD-10-CM

## 2024-12-26 RX ORDER — INSULIN LISPRO 100 [IU]/ML
INJECTION, SOLUTION INTRAVENOUS; SUBCUTANEOUS
Qty: 130 ML | Refills: 1 | Status: SHIPPED | OUTPATIENT
Start: 2024-12-26

## 2024-12-26 NOTE — TELEPHONE ENCOUNTER
PA for  (NovoLOG) 100 units/mL injection DENIED    Reason:(Screenshot if applicable)        Message sent to office clinical pool Yes    Denial letter scanned into Media Yes    Appeal started No (Provider will need to decide if appeal is warranted and send clinical documentation to Prior Authorization Team for initiation.)    **Please follow up with your patient regarding denial and next steps**

## 2025-01-29 ENCOUNTER — NURSE TRIAGE (OUTPATIENT)
Age: 50
End: 2025-01-29

## 2025-01-29 NOTE — PROGRESS NOTES
Heart Failure Outpatient Progress Note - Naina Jamison 49 y.o. 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   -very difficult exam, but with c/o abdominal bloating, chest heaviness, SOB  -will trial on BID Torsemide with 20 meq of Kdur, continue Spironolactone 25 mg daily  -check a BMP in 5-7 days.     CAD.   -S/P NSTEMI with PCI/KSENIA to the LAD.   -She is c/o worsening chest heaviness/pressure radiating to neck and jaw,  and SOB on exertion  Will order stress echo to assess for reproducibility of symptoms, ischemic changes, cardiac structure/function particularly valvular function/wall motion  -Rx: ASA, statin.   -consider addition of beta blocker       [facility] 6/17/24  Stable CAD with patent prior stent and without new progressive CAD findings    LVEDP is normal without gradient on LV-AO pullback    TTE 4/25/24:    Left Ventricle: Left ventricular cavity size is normal. Wall thickness is normal. The left ventricular ejection fraction is 60%. Systolic function is normal. Although no diagnostic regional wall motion abnormality was identified, this possibility cannot be completely excluded on the basis of this study. Diastolic function is  normal.    Aortic Valve: There is moderate stenosis. The aortic valve peak velocity is 2.72 m/s. The aortic valve peak gradient is 30 mmHg. The aortic valve mean gradient is 16 mmHg. The dimensionless velocity index is 0.38. The aortic valve area is 1.51 cm2.    Technically difficult study.  Definity could not be given due to inability to obtain IV access.  --[facility] 7/5/23: Mid LAD lesion is 95% stenosed. Culprit of NSTEMI. S/P successful IVUS-guided PCI to the mid LAD with KSENIA x 1. Mild reisdual CAD amenable to GDMT.LVEDP normal without gradient on LV-AO pullback  --TTE 7/5/23: LVEF 65%, normal diastolic function, RV normal, atrial normal, mild to mod AS, normal MV, trace TR, IVC normal,     Type I DM.   Lab Results   Component Value Date    HGBA1C 8.2 (H) 10/21/2024     HTN.   -well controlled  -Lisinopril 10 mg daily, Spironolactone.     HLD.   Lab Results   Component Value Date    LDLCALC 59 06/17/2024     Bicuspid aortic valve  -She has moderate stenosis by echo April 2024  Morbid obesity. BMI 41. Discussed weight loss  H/o tick bite. Lyme Ab negative on 7/5. Will need to screen regularly given frequent exposures  Anxiety  Fibromyalgia  ANNE-MARIE. Adherent with CPAP at HS.    HPI:   Naina Jamison is a 48 y.o. year old female with a history of T1DM, HTN, hyperlipidemia, and morbid obesity who initially presented to the ED for evaluation of chest/neck pain, headache, and RA paresthesias. She was subsequently found to have ST depressions on EKG; serial troponin ranged from roughly 2,000-4,000. She was aspirin-loaded and, given her symptoms and presentation, was taken for left heart cath yesterday (7/5/23). This revealed a 95% stenosed mid-LAD lesion that was treated with KSENIA placement.     7/11/23. Presents for hospital follow up with her family. Doing well post discharge with exception of fatigue. She is taking all medications faithfully. Made some changes in her diet. Looking forward to starting in cardiac rehab. Has  "had no recurrence of chest pain or SOB. Has been taking walks with her dogs several times per week and tolerating well.     07/11/2024: Patient presents for follow-up. Primary complaint today of shortness of breath primarily at rest and when lying down. Denies SCHAFFER and PND. Also has been experiencing worsening dizziness that is primarily occurring when lying down. Reports having history of cerumen impaction that resulted in severe dizziness and she is plan to follow-up with her PCP regarding this. Denies near-syncope and syncope. Since her telemedicine visit with cardiology last month, patient has continued on torsemide 40 mg BID and reports that weights on her home scale have been holding steady at this current dose. Does feel that she is \"puffy\" in her upper extremities which is where she has retained fluid in the past.  Recently started on Ozempic.     10/18/24: Dr. RIVERA: Naina Jamison is a 49 y.o. year old female with morbid obesity, T1DM, HLD, and premature CAD with NSTEMI and PCI to LAD in July 2023 presenting for follow-up.      She presented with atypical symptoms SLB and found to have elevated troponin > 4000. She underwent cardiac cath 7/5/23 which showed a mid-LAD 95% culprit lesion which was stented (Xience Skypoint 3 x 33 mm KSENIA). Echo showed normal LV function and mild-moderate AS. We followed up with DARRICK on 9/7/23 which confirmed congenitally bicuspid AV.      Since last seen by me she was admitted to [facility] and required IV diuretics. Her oral diuretics were torsemide 40 mg BID on discharge but she reduced to 40 mg daily since then. She also takes spironolactone 25 mg daily. Her weight has been stable on this regimen.       She has persistent fatigue, some left sided abdominal/chest cramping, and nightly leg cramping  Denies orthopnea or PND    ACUTE VISIT:  1/30/25. Presents for acute visit. Comes in with complaints of worsening SOB, and chest pressure that travels to her head, neck and " jaw. Comes on with exertion and has been going on for several weeks.  She is also noting rapid heart rate when she tries to walk her dogs.  BP well controlled. Taking medications as prescribed. She is also complaining of weight gain least 20 pounds over the past 6 months feels she is retaining fluid in her abdomen.  Has a difficult time laying down flat at night.  She had some abnormal labs recently was discussed with her PCP.  However she is still confused about next steps.  Values included an elevated white count, with absolute neutrophils around 9K, and absolute monocytes around 1K, the absence of any acute or remote infection or inflammatory disease.      Past Medical History:   Diagnosis Date    Diabetes mellitus (HCC)     Graves disease     High cholesterol     Hypertension        12 point ROS negative other than that stated in HPI    Allergies   Allergen Reactions    Ticagrelor Anaphylaxis     Made throat feel like it was closing    Milnacipran GI Intolerance    Liraglutide GI Intolerance     Heartburn, nausea.    Oxycodone-Acetaminophen Vomiting and GI Intolerance    Pregabalin Other (See Comments)     Didn't help     .    Current Outpatient Medications:     ALPRAZolam (XANAX) 1 mg tablet, Take 1 mg by mouth daily at bedtime as needed for anxiety Pt unable to verify dose for RN, Disp: , Rfl:     aspirin (ECOTRIN LOW STRENGTH) 81 mg EC tablet, Take 1 tablet (81 mg total) by mouth daily Do not start before July 7, 2023., Disp: , Rfl: 0    atorvastatin (LIPITOR) 40 mg tablet, , Disp: , Rfl:     cholecalciferol (VITAMIN D3) 1,000 units tablet, Take 2,000 Units by mouth daily Pt unable to verify dose for RN (Patient not taking: Reported on 7/2/2024), Disp: , Rfl:     Continuous Blood Gluc Transmit (Guardian 4 Transmitter) MISC, To check blood sugars continuously (Patient not taking: Reported on 10/18/2024), Disp: 1 each, Rfl: 0    DULoxetine (CYMBALTA) 30 mg delayed release capsule, Take 30 mg by mouth daily Pt  unable to verify dose for RN (Patient not taking: Reported on 7/2/2024), Disp: , Rfl:     HYDROcodone-acetaminophen (NORCO) 5-325 mg per tablet, Take 1 tablet by mouth every 6 (six) hours as needed for pain Pt unable to verify dose for RN, Disp: , Rfl:     insulin lispro (HumaLOG) 100 units/mL injection, For insulin pump use, daily requirement of 140 units daily, Disp: 130 mL, Rfl: 1    lisinopril (ZESTRIL) 10 mg tablet, Take 1 tablet (10 mg total) by mouth daily, Disp: 90 tablet, Rfl: 3    nitroglycerin (NITROSTAT) 0.4 mg SL tablet, Place 1 tablet (0.4 mg total) under the tongue every 5 (five) minutes as needed for chest pain, Disp: 30 tablet, Rfl: 2    ondansetron (ZOFRAN) 4 mg tablet, Take 1 tablet (4 mg total) by mouth every 8 (eight) hours as needed for nausea or vomiting (Patient not taking: Reported on 10/18/2024), Disp: 20 tablet, Rfl: 0    PATIENT MAINTAINED INSULIN PUMP, Inject 1 each under the skin every 8 (eight) hours, Disp: , Rfl:     spironolactone (ALDACTONE) 25 mg tablet, Take 1 tablet (25 mg total) by mouth daily, Disp: 30 tablet, Rfl: 11    torsemide (DEMADEX) 20 mg tablet, Take 2 tablets (40 mg total) by mouth daily, Disp: 180 tablet, Rfl: 3    Social History     Socioeconomic History    Marital status: /Civil Union     Spouse name: Not on file    Number of children: Not on file    Years of education: Not on file    Highest education level: Not on file   Occupational History    Not on file   Tobacco Use    Smoking status: Never    Smokeless tobacco: Never   Vaping Use    Vaping status: Never Used   Substance and Sexual Activity    Alcohol use: Never    Drug use: Never    Sexual activity: Not Currently   Other Topics Concern    Not on file   Social History Narrative    Not on file     Social Drivers of Health     Financial Resource Strain: Not At Risk (1/28/2025)    Received from [facility]    Financial Insecurity     In the last 12 months did you skip medications to save  "money?: No     In the last 12 months was there a time when you needed to see a doctor but could not because of cost?: No   Food Insecurity: No Food Insecurity (1/28/2025)    Received from [facility]    Food Insecurity     In the last 12 months did you ever eat less than you felt you should because there wasn't enough money for food?: No   Transportation Needs: No Transportation Needs (1/28/2025)    Received from [facility]    Transportation Needs     In the last 12 months have you ever had to go without healthcare because you didn't have a way to get there?: No   Physical Activity: Not on file   Stress: Not on file   Social Connections: Socially Integrated (1/28/2025)    Received from [facility]    Social Connection     Do you often feel lonely?: No   Intimate Partner Violence: Not on file   Housing Stability: Not At Risk (1/28/2025)    Received from [facility]    Housing Stability     Are you worried that in the next 2 months you may not have stable housing?: No       No family history on file.    Physical Exam:    Vitals: /60 (BP Location: Left arm, Patient Position: Sitting, Cuff Size: Large)   Pulse 99   Ht 5' 4\" (1.626 m)   Wt 120 kg (265 lb)   SpO2 97%   BMI 45.49 kg/m²     Wt Readings from Last 3 Encounters:   10/18/24 112 kg (246 lb)   10/17/24 111 kg (244 lb)   07/11/24 113 kg (249 lb 14.4 oz)         GEN: Naina Jamison appears well, alert and oriented x 3, pleasant and cooperative   HEENT: pupils equal, round, and reactive to light; extraocular muscles intact  NECK: supple, no carotid bruits   HEART: regular rhythm, normal S1 and S2, no murmurs, clicks, gallops or rubs, JVP is flat    LUNGS: clear to auscultation bilaterally; no wheezes, rales, or rhonchi   ABDOMEN: normal bowel sounds, firm, no tenderness, + distention  EXTREMITIES: peripheral pulses normal; no clubbing, cyanosis, or edema  NEURO: no focal findings " "  SKIN: normal without suspicious lesions on exposed skin    Labs & Results:    Chemistry        Component Value Date/Time    K 4 01/28/2025 1312     01/28/2025 1312    CO2 29 01/28/2025 1312    BUN 22 01/28/2025 1312    BUN 12 07/02/2023 1910    CREATININE 1.06 (H) 01/28/2025 1312        Component Value Date/Time    CALCIUM 9.4 01/28/2025 1312    ALKPHOS 103 01/28/2025 1312    AST 25 01/28/2025 1312    ALT 37 (H) 01/28/2025 1312        Lab Results   Component Value Date    WBC 11.20 (H) 06/15/2024    HGB 13.4 06/15/2024    HCT 40.0 06/15/2024    MCV 88 06/15/2024     06/15/2024     No results found for: \"NTBNP\"   Lab Results   Component Value Date    BNP 13 06/27/2024      Lab Results   Component Value Date    LDLCALC 59 06/17/2024     Lab Results   Component Value Date    BYF0ZPTUACHZ 1.600 06/14/2024    TSH 1.40 10/04/2021       EKG personally reviewed by PENG Hudson.   No results found for this visit on 01/30/25.     Total face to face time spent with patient 25 minutes.  An additional 10 minutes was spent for chart/data review and visit preparation.       PENG Hudson   "

## 2025-01-29 NOTE — TELEPHONE ENCOUNTER
"Reason for Conversation: Received mychart message from pt stating,  \"Good afternoon,     I wanted to reach out and let you know for the past month my symptoms seem to be worsening. I am retaining fluid again up to about 20 pounds even with the diuretic, I am having the headaches if I get my heart rate up even a little, I feel shortness of breath/ hard to breath. While laying down I am having wheezing and tightness.   I saw my PCP this morning through  and he recommended that I get my echogram done now instead of waiting until April.   I also see Dr. Aguilera, through [facility] this week.      Please advice on how to proceed. I am not sure if you need to have someone call me to reschedule the test.      Thank you so much!     Naina\"    S/w pt to gather more info. She stated in the past 6 weeks, she has been feeling sob at rest and with exertion, chest tightness when taking a deep breath, swelling in her arms, legs, neck, and R hand, and exhaustion. She stated she does not record her weight daily but stated has gained 15lbs in the past month and a half. She also notes she gets headaches when she notices her HR to increase while walking. Pt however does not take her BP nor HR at home. She is currently taking torsemide 40mg daily, spironolactone 25mg daily, and lisinopril 10mg daily. She saw her pcp yesterday (ANDREW) who ordered her to get blood work (resulted in chart) and to follow up with Cardiology / get an echo scan sooner rather than later.    Advised pt will send a message to the provider to review and advise.     VS/Weight: (Note: Please include date/time vitals/weight were measured)  does not take    Pain: Yes     -Pain Score: mild    -Location: b/l upper collarbones     -Radiation: top of spine into head     -Duration: mildly always present     -Type (tightness, crushing etc.): tightness     Risk Factors: Heart Failure    Recent relevant testing and date of testing: echo currently scheduled for " "4/18/2025    Medication:  torsemide 40mg daily, spironolactone 25mg daily, and lisinopril 10mg daily    Upcoming Office Visit: 4/28/2025    Last Office Visit: 10/18/2024            Answer Assessment - Initial Assessment Questions  1. MAIN CONCERN OR SYMPTOM: \"What is your main concern right now?\" \"What's the main symptom you're worried about?\" (e.g., breathing difficulty, ankle swelling, weight gain).      Weight gain, sob with exertion, headaches when HR goes up, chest tightness  2. ONSET: \"When did the  symptoms  start?\"      Over the past 6 weeks   3. BREATHING DIFFICULTY: \"Are you having any difficulty breathing?\" If Yes, ask: \"How bad is it?\"  (e.g., none, mild, moderate, severe)  \"Is this worse than usual for you?\"      Sob at rest and with exertion. States feels hard to take a deep breath   4. EDEMA - FOOT-LEG SWELLING: \"Do you have swelling of your ankles, feet or legs?\" If Yes, ask: \"How bad is the swelling?\" (e.g., localized; mild, moderate, severe)      Has swelling in b/l legs, arms, neck, and R hand   5. WEIGHT - CURRENT: \"What is your weight today?\"      260lbs  6. WEIGHT - TARGET RANGE: \"Do you try to keep your weight in a target (goal) range?\" If Yes, ask: \"What is that range?\"      240lbs  7. WEIGHT - CHANGE: \"Have you gained (or lost) weight in the past 24 hours? Past week (7 days)?\" If Yes, ask:  \"How much weight?\"      15lbs over the past month and a half   8. OTHER SYMPTOMS: \"Do you have any other symptoms?\" (e.g., depression, weakness or fatigue, abdomen bloating, hacky cough)      Exhaustion, chest tightness  9. DIURETICS: \"Are you currently taking water pills?\" (e.g., furosemide [Lasix], hydrochlorothiazide [HCTZ], bumetanide [Bumex], metolazone [Zaroxolyn]) If Yes, ask: \"What medicine are you taking, and how often?\"  \"Any recent change in dose?\"       Torsemide 40mg daily, spironolactone 25mg daily   10. O2 SATURATION MONITOR: \"Do you use an oxygen saturation monitor (pulse oximeter) at " "home?\" If Yes, ask: \"What is your reading (oxygen level) today?\" \"What is your usual oxygen saturation reading?\" (e.g., 95%)        Does not take  11. HEART FAILURE HCP: \"Who treats your heart failure?\"  (e.g., cardiologist or heart specialist, heart failure clinic or center, primary care doctor)        Cardiology   12. FLUID and SODIUM RESTRICTIONS: \"Have you been instructed to restrict your daily fluid intake or sodium (salt) intake?\" If Yes, ask: \"What are your daily limits?\"        Not on a restriction    Answer Assessment - Initial Assessment Questions  1. LOCATION: \"Where does it hurt?\"        B/l upper collar bones   2. RADIATION: \"Does the pain go anywhere else?\" (e.g., into neck, jaw, arms, back)      Top of spine into back  3. ONSET: \"When did the chest pain begin?\" (Minutes, hours or days)       Getting worse over the past 6 weeks   4. PATTERN: \"Does the pain come and go, or has it been constant since it started?\"  \"Does it get worse with exertion?\"       Mildly always present   5. DURATION: \"How long does it last\" (e.g., seconds, minutes, hours)      Mildly always there   6. SEVERITY: \"How bad is the pain?\"  (e.g., Scale 1-10; mild, moderate, or severe)      Prevents from doing normal activities   7. CARDIAC RISK FACTORS: \"Do you have any history of heart problems or risk factors for heart disease?\" (e.g., angina, prior heart attack; diabetes, high blood pressure, high cholesterol, smoker, or strong family history of heart disease)      HF  8. PULMONARY RISK FACTORS: \"Do you have any history of lung disease?\"  (e.g., blood clots in lung, asthma, emphysema, birth control pills)      Denies   9. CAUSE: \"What do you think is causing the chest pain?\"      HF flare up   10. OTHER SYMPTOMS: \"Do you have any other symptoms?\" (e.g., dizziness, nausea, vomiting, sweating, fever, difficulty breathing, cough)        Has been getting dizziness as well that comes and goes and goes away after a few " sivakumar.    Protocols used: Heart Failure on Treatment Follow-up Call-Adult-[place], Chest Pain-Adult-[place]

## 2025-01-30 ENCOUNTER — OFFICE VISIT (OUTPATIENT)
Dept: CARDIOLOGY CLINIC | Facility: CLINIC | Age: 50
End: 2025-01-30
Payer: COMMERCIAL

## 2025-01-30 VITALS
DIASTOLIC BLOOD PRESSURE: 60 MMHG | SYSTOLIC BLOOD PRESSURE: 110 MMHG | HEIGHT: 64 IN | WEIGHT: 265 LBS | BODY MASS INDEX: 45.24 KG/M2 | OXYGEN SATURATION: 97 % | HEART RATE: 99 BPM

## 2025-01-30 DIAGNOSIS — I50.32 CHRONIC HEART FAILURE WITH PRESERVED EJECTION FRACTION (HCC): ICD-10-CM

## 2025-01-30 DIAGNOSIS — E10.59 TYPE 1 DIABETES MELLITUS WITH OTHER CIRCULATORY COMPLICATION (HCC): ICD-10-CM

## 2025-01-30 DIAGNOSIS — Q23.1 CONGENITAL INSUFFICIENCY OF AORTIC VALVE: ICD-10-CM
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
## 2025-01-30 DIAGNOSIS — I50.30 DIASTOLIC CONGESTIVE HEART FAILURE, UNSPECIFIED HF CHRONICITY (HCC): Primary | ICD-10-CM

## 2025-01-30 DIAGNOSIS — I25.119 CORONARY ARTERY DISEASE INVOLVING NATIVE HEART WITH ANGINA PECTORIS, UNSPECIFIED VESSEL OR LESION TYPE (HCC): ICD-10-CM

## 2025-01-30 DIAGNOSIS — S20.469A TICK BITE OF BACK WALL OF THORAX, UNSPECIFIED LOCATION, INITIAL ENCOUNTER: ICD-10-CM

## 2025-01-30 PROCEDURE — 99215 OFFICE O/P EST HI 40 MIN: CPT | Performed by: NURSE PRACTITIONER

## 2025-01-30 PROCEDURE — 93000 ELECTROCARDIOGRAM COMPLETE: CPT | Performed by: NURSE PRACTITIONER

## 2025-01-30 RX ORDER — TORSEMIDE 20 MG/1
40 TABLET ORAL 2 TIMES DAILY
Qty: 180 TABLET | Refills: 3 | Status: SHIPPED | OUTPATIENT
Start: 2025-01-30

## 2025-01-30 RX ORDER — POTASSIUM CHLORIDE 1500 MG/1
20 TABLET, EXTENDED RELEASE ORAL DAILY
Qty: 30 TABLET | Refills: 2 | Status: SHIPPED | OUTPATIENT
Start: 2025-01-30

## 2025-01-30 NOTE — PATIENT INSTRUCTIONS
Weigh yourself daily  If you gain 3 lbs in one day or 5 lbs in one week, please call the office at [phone] and ask for a nurse or the heart failure nurse  Keep your sodium intake to <2 grams, (2000 mg) per day, and fluids <2 Liters (2000 ml) per day. This is around 6-7, 8 oz glasses of fluid per day    Increase Torsemide to 40 mg twice daily  Start taking Potassium 20 meq daily  Get lab work 5-7 days.   Schedule stress echo

## 2025-01-31 ENCOUNTER — TELEPHONE (OUTPATIENT)
Dept: ADMINISTRATIVE | Facility: OTHER | Age: 50
End: 2025-01-31

## 2025-01-31 ENCOUNTER — OFFICE VISIT (OUTPATIENT)
Dept: ENDOCRINOLOGY | Facility: CLINIC | Age: 50
End: 2025-01-31
Payer: COMMERCIAL

## 2025-01-31 VITALS
TEMPERATURE: 98.1 F | BODY MASS INDEX: 44.49 KG/M2 | WEIGHT: 260.6 LBS | RESPIRATION RATE: 18 BRPM | DIASTOLIC BLOOD PRESSURE: 70 MMHG | HEART RATE: 96 BPM | SYSTOLIC BLOOD PRESSURE: 128 MMHG | HEIGHT: 64 IN | OXYGEN SATURATION: 98 %

## 2025-01-31 DIAGNOSIS — E78.2 MIXED HYPERLIPIDEMIA: ICD-10-CM

## 2025-01-31 DIAGNOSIS — E10.59 TYPE 1 DIABETES MELLITUS WITH OTHER CIRCULATORY COMPLICATION (HCC): Primary | ICD-10-CM

## 2025-01-31 DIAGNOSIS — E66.01 MORBID OBESITY (HCC): ICD-10-CM

## 2025-01-31 DIAGNOSIS — I10 ESSENTIAL HYPERTENSION: ICD-10-CM

## 2025-01-31 LAB — SL AMB POCT HEMOGLOBIN AIC: 8.3 (ref ?–6.5)

## 2025-01-31 PROCEDURE — 99214 OFFICE O/P EST MOD 30 MIN: CPT | Performed by: STUDENT IN AN ORGANIZED HEALTH CARE EDUCATION/TRAINING PROGRAM

## 2025-01-31 PROCEDURE — 83036 HEMOGLOBIN GLYCOSYLATED A1C: CPT | Performed by: STUDENT IN AN ORGANIZED HEALTH CARE EDUCATION/TRAINING PROGRAM

## 2025-01-31 PROCEDURE — 95251 CONT GLUC MNTR ANALYSIS I&R: CPT | Performed by: STUDENT IN AN ORGANIZED HEALTH CARE EDUCATION/TRAINING PROGRAM

## 2025-01-31 RX ORDER — TIRZEPATIDE 2.5 MG/.5ML
2.5 INJECTION, SOLUTION SUBCUTANEOUS WEEKLY
Qty: 2 ML | Refills: 0 | Status: SHIPPED | OUTPATIENT
Start: 2025-01-31 | End: 2025-03-02

## 2025-01-31 RX ORDER — INSULIN LISPRO 100 [IU]/ML
INJECTION, SOLUTION INTRAVENOUS; SUBCUTANEOUS
Qty: 180 ML | Refills: 1 | Status: SHIPPED | OUTPATIENT
Start: 2025-01-31

## 2025-01-31 NOTE — ASSESSMENT & PLAN NOTE
Lab Results   Component Value Date    HGBA1C 8.3 (A) 01/31/2025     Glycemic control remains suboptimally controlled with A1c of 8.3% and the goal less than 7-6.5% with minimal hypoglycemia.  We reviewed her pump report.  She continues to underestimates her carb amount and not inserting at the right time causing postprandial hyperglycemia for which she was instructed to insert right amount of carb at the right time.  Mounjaro 2.5 mg resumed.  Healthier dietary choices advised.  Ophthalmology and podiatry follow-up.  Return back in 3 months for  Labs prior to next visit.    Orders:    POCT hemoglobin A1c    Tirzepatide (Mounjaro) 2.5 MG/0.5ML SOAJ; Inject 2.5 mg under the skin once a week

## 2025-01-31 NOTE — PROGRESS NOTES
Name: Naina Jamison      : 1975      MRN: [id number]  Encounter Provider: Adrienne Aguilera MD  Encounter Date: 2025   Encounter department: [facility] [place]  :  Assessment & Plan  Type 1 diabetes mellitus with other circulatory complication (HCC)    Lab Results   Component Value Date    HGBA1C 8.3 (A) 2025     Glycemic control remains suboptimally controlled with A1c of 8.3% and the goal less than 7-6.5% with minimal hypoglycemia.  We reviewed her pump report.  She continues to underestimates her carb amount and not inserting at the right time causing postprandial hyperglycemia for which she was instructed to insert right amount of carb at the right time.  Mounjaro 2.5 mg resumed.  Healthier dietary choices advised.  Ophthalmology and podiatry follow-up.  Return back in 3 months for  Labs prior to next visit.    Orders:    POCT hemoglobin A1c    Tirzepatide (Mounjaro) 2.5 MG/0.5ML SOAJ; Inject 2.5 mg under the skin once a week    Morbid obesity    Orders:    Tirzepatide (Mounjaro) 2.5 MG/0.5ML SOAJ; Inject 2.5 mg under the skin once a week    Hemoglobin A1C; Future    Comprehensive metabolic panel; Future    Essential hypertension  Blood pressure is at goal 128/70, the goal less than 130/80.  Continue current regimen including lisinopril 10 g daily.         Mixed hyperlipidemia  Continue Lipitor 40 mg daily.  LDL goal less than 70.             History of Present Illness   HPI  Naina Jamison is a 49 y.o. female who presents for follow-up for type 1 diabetes.        Patient is on a Medtronic 7 80G pump prescribed by endocrinology.   She denies any malfunctioning of the pump.      Current Insulin pump settings:  Basal rate:  12 AM: 2.2 units/h  3 AM: 1.8 units/h  7 AM: 3.3 units/h  3 PM: 3.5 units/h  10 PM: 2.8 units/h  Insulin to carb ratio:  12 AM 5  6 AM 2.5  12 PM: 2.5  10 PM: 5  Insulin sensitivity factor:  12 AM: 60  6 AM: 10  11 PM: 16  BG target:  "100 to 120 mg/dL  Type of insulin: Humalog  Active Insulin Time: 2 hours    Management of glucose reported by patient: Postprandial hyperglycemia  Episodes of hypoglycemia. Use of Glucagon    Diet: Carb consistent diet  Discussed with patient in case of malfunctioning of the pump to use basal and bolus therapy as backup which is prescribed to the patient. Also notified patient to call clinic if any issues.       Naina Jamison   Device used, guardian sensor for  Home use       Indication   Type 1 diabetes      More than 72 hours of data was reviewed. Report to be scanned to chart.     Date Range: January 17 -31    Analysis of data:   Average Glucose: 163 mg/dL  Coefficient of Variation: 32.3%  SD : 53 mg/dL  Time in Target Range: 68%  Time Above Range: 32%  Time Below Range: 0       Eye exam: UTD  Foot exam:  offered, declined    ACE/ARB's: Lisinopril 10 mg daily  Statin: Lipitor 40 mg daily        Review of Systems   Constitutional:  Negative for unexpected weight change.   Gastrointestinal:  Negative for constipation and diarrhea.   Endocrine: Negative for polydipsia and polyuria.   Musculoskeletal:  Positive for joint swelling.          Objective   /70 (BP Location: Right arm, Patient Position: Sitting, Cuff Size: Adult)   Pulse 96   Temp 98.1 °F (36.7 °C) (Temporal)   Resp 18   Ht 5' 4\" (1.626 m)   Wt 118 kg (260 lb 9.6 oz)   SpO2 98%   BMI 44.73 kg/m²      Physical Exam  Vitals and nursing note reviewed.   Constitutional:       General: She is not in acute distress.     Appearance: She is well-developed.   HENT:      Head: Normocephalic and atraumatic.   Cardiovascular:      Rate and Rhythm: Normal rate and regular rhythm.      Heart sounds: Murmur heard.   Pulmonary:      Effort: Pulmonary effort is normal. No respiratory distress.   Abdominal:      Palpations: Abdomen is soft.   Musculoskeletal:         General: No swelling.      Cervical back: Neck supple.   Skin:     General: Skin is warm " and dry.   Neurological:      Mental Status: She is alert.           Component      Latest Ref Rng 1/31/2025   Hemoglobin A1C      <=6.5  8.3 !       Legend:  ! Abnormal

## 2025-01-31 NOTE — ASSESSMENT & PLAN NOTE
Orders:    Tirzepatide (Mounjaro) 2.5 MG/0.5ML SOAJ; Inject 2.5 mg under the skin once a week    Hemoglobin A1C; Future    Comprehensive metabolic panel; Future

## 2025-01-31 NOTE — TELEPHONE ENCOUNTER
----- Message from Juanita SHAY sent at 1/31/2025 11:20 AM EST -----  ..01/31/25 11:20 AM    Hello, our patient Naina Jamison has had Diabetic Eye Exam completed/performed. Please assist in updating the patient chart by making an External outreach to Dr Chavez at [facility] located in [place]. The date of service is past three months  maybe sept or oct.    Thank you,  Juanita Lopez MA  [facility] [place]

## 2025-01-31 NOTE — TELEPHONE ENCOUNTER
Upon review of the In Basket request and the patient's chart, initial outreach has been made via fax to facility. Please see Contacts section for details.     X1 eye    Thank you  Juanita Cardenas

## 2025-01-31 NOTE — LETTER
Diabetic Eye Exam Form    Date Requested: 25  Patient: Naina Jamison  Patient : 1975   Referring Provider: Antione Pratt MD      DIABETIC Eye Exam Date _______________________________      Type of Exam MUST be documented for Diabetic Eye Exams. Please CHECK ONE.     Retinal Exam       Dilated Retinal Exam       OCT       Optomap-Iris Exam      Fundus Photography       Left Eye - Please check Retinopathy or No Retinopathy        Exam did show retinopathy    Exam did not show retinopathy       Right Eye - Please check Retinopathy or No Retinopathy       Exam did show retinopathy    Exam did not show retinopathy       Comments ___please fill out our form _______________________________________________________    Practice Providing Exam ______________________________________________    Exam Performed By (print name) _______________________________________      Provider Signature ___________________________________________________      These reports are needed for  compliance.  Please fax this completed form and a copy of the Diabetic Eye Exam report to our office located at [address] [place] [address] as soon as possible via Fax [phone] attention Juanita: Phone [phone]  We thank you for your assistance in treating our mutual patient.

## 2025-01-31 NOTE — LETTER
Diabetic Eye Exam Form    Date Requested: 25  Patient: Naina Jamison  Patient : 1975   Referring Provider: Antione Pratt MD      DIABETIC Eye Exam Date _______________________________      Type of Exam MUST be documented for Diabetic Eye Exams. Please CHECK ONE.     Retinal Exam       Dilated Retinal Exam       OCT       Optomap-Iris Exam      Fundus Photography       Left Eye - Please check Retinopathy or No Retinopathy        Exam did show retinopathy    Exam did not show retinopathy       Right Eye - Please check Retinopathy or No Retinopathy       Exam did show retinopathy    Exam did not show retinopathy       Comments __________________________________________________________    Practice Providing Exam ______________________________________________    Exam Performed By (print name) _______________________________________      Provider Signature ___________________________________________________      These reports are needed for  compliance.  Please fax this completed form and a copy of the Diabetic Eye Exam report to our office located at [address] [place] [address] as soon as possible via Fax [phone] attention Juanita: Phone [phone]  We thank you for your assistance in treating our mutual patient.

## 2025-01-31 NOTE — ASSESSMENT & PLAN NOTE
Blood pressure is at goal 128/70, the goal less than 130/80.  Continue current regimen including lisinopril 10 g daily.

## 2025-02-05 ENCOUNTER — HOSPITAL ENCOUNTER (OUTPATIENT)
Dept: NON INVASIVE DIAGNOSTICS | Facility: HOSPITAL | Age: 50
Discharge: HOME | End: 2025-02-05
Payer: COMMERCIAL

## 2025-02-05 VITALS
SYSTOLIC BLOOD PRESSURE: 130 MMHG | HEIGHT: 64 IN | WEIGHT: 258 LBS | HEART RATE: 82 BPM | BODY MASS INDEX: 44.05 KG/M2 | DIASTOLIC BLOOD PRESSURE: 80 MMHG | OXYGEN SATURATION: 98 % | RESPIRATION RATE: 20 BRPM

## 2025-02-05 DIAGNOSIS — I25.119 CORONARY ARTERY DISEASE INVOLVING NATIVE HEART WITH ANGINA PECTORIS, UNSPECIFIED VESSEL OR LESION TYPE (HCC): ICD-10-CM

## 2025-02-05 DIAGNOSIS — I50.30 DIASTOLIC CONGESTIVE HEART FAILURE, UNSPECIFIED HF CHRONICITY (HCC): ICD-10-CM

## 2025-02-05 LAB
AORTIC VALVE MEAN VELOCITY: 19.8 M/S
AV AREA BY CONTINUOUS VTI: 1.1 CM2
AV AREA PEAK VELOCITY: 1.1 CM2
AV LVOT MEAN GRADIENT: 2 MMHG
AV LVOT PEAK GRADIENT: 4 MMHG
AV MEAN PRESS GRAD SYS DOP V1V2: 18 MMHG
AV ORIFICE AREA US: 1.07 CM2
AV PEAK GRADIENT: 31 MMHG
AV VELOCITY RATIO: 0.34
AV VMAX SYS DOP: 2.79 M/S
DOP CALC AO VTI: 59.85 CM
DOP CALC LVOT AREA: 3.14 CM2
DOP CALC LVOT CARDIAC INDEX: 2.49 L/MIN/M2
DOP CALC LVOT CARDIAC OUTPUT: 5.46 L/MIN
DOP CALC LVOT DIAMETER: 2 CM
DOP CALC LVOT PEAK VEL VTI: 20.4 CM
DOP CALC LVOT PEAK VEL: 0.97 M/S
DOP CALC LVOT STROKE INDEX: 30.6 ML/M2
DOP CALC LVOT STROKE VOLUME: 64.06
MAX HR PERCENT: 94 %
MAX HR: 160 BPM
RATE PRESSURE PRODUCT: NORMAL
SL CV LV EF: 60
SL CV STRESS RECOVERY BP: NORMAL MMHG
SL CV STRESS RECOVERY HR: 97 BPM
SL CV STRESS RECOVERY O2 SAT: 98 %
SL CV STRESS STAGE REACHED: 2
STRESS ANGINA INDEX: 0
STRESS BASELINE BP: NORMAL MMHG
STRESS BASELINE HR: 82 BPM
STRESS O2 SAT REST: 98 %
STRESS PEAK HR: 160 BPM
STRESS POST ESTIMATED WORKLOAD: 7 METS
STRESS POST EXERCISE DUR MIN: 5 MIN
STRESS POST EXERCISE DUR SEC: 30 SEC
STRESS POST O2 SAT PEAK: 96 %
STRESS POST PEAK BP: 164 MMHG

## 2025-02-05 PROCEDURE — 93350 STRESS TTE ONLY: CPT | Performed by: INTERNAL MEDICINE

## 2025-02-05 PROCEDURE — 93350 STRESS TTE ONLY: CPT

## 2025-02-06 ENCOUNTER — RESULTS FOLLOW-UP (OUTPATIENT)
Dept: CARDIOLOGY CLINIC | Facility: CLINIC | Age: 50
End: 2025-02-06

## 2025-02-06 LAB
MAX DIASTOLIC BP: 90 MMHG
MAX PREDICTED HEART RATE: 173 BPM
PROTOCOL NAME: NORMAL
STRESS POST EXERCISE DUR MIN: 5 MIN
STRESS POST EXERCISE DUR SEC: 30 SEC
STRESS POST PEAK HR: 169 BPM
STRESS POST PEAK SYSTOLIC BP: 164 MMHG
TARGET HR FORMULA: NORMAL
TEST INDICATION: NORMAL

## 2025-02-06 NOTE — TELEPHONE ENCOUNTER
Called patient and left message.     ----- Message from Mar FRITZ sent at 2/6/2025 12:25 PM EST -----    ----- Message -----  From: PENG Hudson  Sent: 2/5/2025   5:35 PM EST  To: [facility]    Please call patient and let her know stress echo results were normal. We can discuss in more detail at next OV

## 2025-02-07 NOTE — TELEPHONE ENCOUNTER
As a follow-up, a second attempt has been made for outreach via telephone call to facility. Please see Contacts section for details.    X2 eye call    Thank you  Juanita Cardenas

## 2025-02-08 LAB
ALBUMIN SERPL-MCNC: 4.1 G/DL (ref 3.6–5.1)
ALBUMIN/GLOB SERPL: 1.8 (CALC) (ref 1–2.5)
ALP SERPL-CCNC: 114 U/L (ref 31–125)
ALT SERPL-CCNC: 59 U/L (ref 6–29)
AST SERPL-CCNC: 36 U/L (ref 10–35)
B BURGDOR AB SER IA-ACNC: <0.9 INDEX
BILIRUB SERPL-MCNC: 0.4 MG/DL (ref 0.2–1.2)
BNP SERPL-MCNC: 12 PG/ML
BUN SERPL-MCNC: 21 MG/DL (ref 7–25)
BUN/CREAT SERPL: 21 (CALC) (ref 6–22)
CALCIUM SERPL-MCNC: 8.7 MG/DL (ref 8.6–10.2)
CHLORIDE SERPL-SCNC: 97 MMOL/L (ref 98–110)
CO2 SERPL-SCNC: 32 MMOL/L (ref 20–32)
CREAT SERPL-MCNC: 1.02 MG/DL (ref 0.5–0.99)
CRP SERPL-MCNC: 4.4 MG/L
ERYTHROCYTE [SEDIMENTATION RATE] IN BLOOD BY WESTERGREN METHOD: 2 MM/H
GFR/BSA.PRED SERPLBLD CYS-BASED-ARV: 68 ML/MIN/1.73M2
GLOBULIN SER CALC-MCNC: 2.3 G/DL (CALC) (ref 1.9–3.7)
GLUCOSE SERPL-MCNC: 191 MG/DL (ref 65–99)
POTASSIUM SERPL-SCNC: 4.4 MMOL/L (ref 3.5–5.3)
PROT SERPL-MCNC: 6.4 G/DL (ref 6.1–8.1)
SODIUM SERPL-SCNC: 137 MMOL/L (ref 135–146)

## 2025-02-11 ENCOUNTER — RESULTS FOLLOW-UP (OUTPATIENT)
Dept: CARDIOLOGY CLINIC | Facility: CLINIC | Age: 50
End: 2025-02-11

## 2025-02-11 NOTE — TELEPHONE ENCOUNTER
Called patient and gave message.       ----- Message from Mar FRITZ sent at 2/10/2025  8:36 AM EST -----    ----- Message -----  From: PENG Hudson  Sent: 2/10/2025   8:33 AM EST  To: [facility]    Please let patient know overall her lab work looks ok. Has mild elevation in her liver function tests which can be discussed further at next OV.

## 2025-02-13 NOTE — TELEPHONE ENCOUNTER
As a final attempt, a third outreach has been made via fax to facility. Please see Contacts section for details. This encounter will be closed and completed by end of day. Should we receive the requested information because of previous outreach attempts, the requested patient's chart will be updated appropriately.     Thank you  Juanita Cardenas

## 2025-02-18 ENCOUNTER — TELEPHONE (OUTPATIENT)
Dept: ENDOCRINOLOGY | Facility: CLINIC | Age: 50
End: 2025-02-18

## 2025-02-18 NOTE — TELEPHONE ENCOUNTER
I called patient to inquire about the denial. She said she got something from the pharmacy. It looks like a prior auth was done for the zepbound and that was denied.     She needs a prior done for mounjaro 2.5mg. Provider ordered it 1/31/2025

## 2025-02-20 NOTE — TELEPHONE ENCOUNTER
PA for Mounjaro 2.5mg SUBMITTED to [facility]    via    []CMM-KEY:   [x]Surescripts-Case ID #   []Availity-Auth ID # NDC #   []Faxed to plan   []Other website   []Phone call Case ID #     [x]PA sent as URGENT    All office notes, labs and other pertaining documents and studies sent. Clinical questions answered. Awaiting determination from insurance company.     Turnaround time for your insurance to make a decision on your Prior Authorization can take 7-21 business days.

## 2025-02-21 NOTE — TELEPHONE ENCOUNTER
Document scanned into media.    Prior auth denied due to not meeting both criteria.    A1C needs to be 6.5 or higher and have a diagnosis of Type 2    Patients A1c is 8.3 but has diagnosis of type 1

## 2025-02-25 NOTE — TELEPHONE ENCOUNTER
PA for Mounjaro 2.5mg DENIED    Reason:        Message sent to office clinical pool Yes    Denial letter scanned into Media Yes    Appeal started No (Provider will need to decide if appeal is warranted and send clinical documentation to Prior Authorization Team for initiation.)    **Please follow up with your patient regarding denial and next steps**

## 2025-02-26 NOTE — TELEPHONE ENCOUNTER
Called patient to inform. She doesn't want the referral to weight management at this time.  She will discuss more with provider at the next appt

## 2025-03-06 ENCOUNTER — TELEPHONE (OUTPATIENT)
Age: 50
End: 2025-03-06

## 2025-03-06 DIAGNOSIS — E10.59 TYPE 1 DIABETES MELLITUS WITH OTHER CIRCULATORY COMPLICATION (HCC): ICD-10-CM

## 2025-03-06 RX ORDER — INSULIN LISPRO 100 [IU]/ML
INJECTION, SOLUTION INTRAVENOUS; SUBCUTANEOUS
Qty: 180 ML | Refills: 1 | Status: SHIPPED | OUTPATIENT
Start: 2025-03-06

## 2025-03-06 NOTE — TELEPHONE ENCOUNTER
PA for (HumaLOG) 100 units/mLSUBMITTED to [employer]    via      [x]Surescripts-Case ID #       [x]PA sent as URGENT    All office notes, labs and other pertaining documents and studies sent. Clinical questions answered. Awaiting determination from insurance company.     Turnaround time for your insurance to make a decision on your Prior Authorization can take 7-21 business days.

## 2025-03-06 NOTE — TELEPHONE ENCOUNTER
Phone call from patient needs additional insulin was supposed to be increased. Transferred to CTS for further discussion.

## 2025-03-06 NOTE — TELEPHONE ENCOUNTER
Patient states she is running out of insulin lispro and they will not refill it until Saturday unless a new rx is sent. States she needs the rx sent to [facility] that states 200 units daily. Thank you.

## 2025-03-14 NOTE — TELEPHONE ENCOUNTER
PA for (HumaLOG) 100 units/mL APPROVED     Date(s) approved March 13, 2025 to March 13, 2026       Patient advised by          []MyChart Message  [x]Phone call   []LMOM  []L/M to call office as no active Communication consent on file  []Unable to leave detailed message as VM not approved on Communication consent       Pharmacy advised by    [x]Fax  []Phone call  []Secure Chat    Specialty Pharmacy    []     Approval letter scanned into Media Yes

## 2025-04-18 ENCOUNTER — HOSPITAL ENCOUNTER (OUTPATIENT)
Dept: NON INVASIVE DIAGNOSTICS | Facility: CLINIC | Age: 50
Discharge: HOME | End: 2025-04-18
Payer: COMMERCIAL

## 2025-04-18 VITALS
BODY MASS INDEX: 44.05 KG/M2 | WEIGHT: 258 LBS | HEIGHT: 64 IN | SYSTOLIC BLOOD PRESSURE: 130 MMHG | HEART RATE: 82 BPM | DIASTOLIC BLOOD PRESSURE: 80 MMHG

## 2025-04-18 DIAGNOSIS — Q23.81 BICUSPID AORTIC VALVE: ICD-10-CM

## 2025-04-18 LAB
AORTIC ROOT: 2.2 CM
AORTIC VALVE MEAN VELOCITY: 20.2 M/S
AV AREA BY CONTINUOUS VTI: 1.3 CM2
AV AREA PEAK VELOCITY: 1.3 CM2
AV LVOT MEAN GRADIENT: 3 MMHG
AV LVOT PEAK GRADIENT: 4 MMHG
AV MEAN PRESS GRAD SYS DOP V1V2: 19 MMHG
AV ORIFICE AREA US: 1.34 CM2
AV PEAK GRADIENT: 33 MMHG
AV VELOCITY RATIO: 0.39
AV VMAX SYS DOP: 2.88 M/S
BSA FOR ECHO PROCEDURE: 2.18 M2
DOP CALC AO VTI: 60.04 CM
DOP CALC LVOT AREA: 3.46 CM2
DOP CALC LVOT CARDIAC INDEX: 3.12 L/MIN/M2
DOP CALC LVOT CARDIAC OUTPUT: 6.79 L/MIN
DOP CALC LVOT DIAMETER: 2.1 CM
DOP CALC LVOT PEAK VEL VTI: 23.3 CM
DOP CALC LVOT PEAK VEL: 1.05 M/S
DOP CALC LVOT STROKE INDEX: 36.7 ML/M2
DOP CALC LVOT STROKE VOLUME: 80.66
E WAVE DECELERATION TIME: 171 MS
E/A RATIO: 1.18
FRACTIONAL SHORTENING: 40 (ref 28–44)
INTERVENTRICULAR SEPTUM IN DIASTOLE (PARASTERNAL SHORT AXIS VIEW): 1.2 CM
INTERVENTRICULAR SEPTUM: 1.2 CM (ref 0.6–1.1)
LAAS-AP2: 18.3 CM2
LAAS-AP4: 18.4 CM2
LEFT ATRIUM SIZE: 4.1 CM
LEFT ATRIUM VOLUME (MOD BIPLANE): 49 ML
LEFT ATRIUM VOLUME INDEX (MOD BIPLANE): 22.5 ML/M2
LEFT INTERNAL DIMENSION IN SYSTOLE: 2.7 CM (ref 2.1–4)
LEFT VENTRICULAR INTERNAL DIMENSION IN DIASTOLE: 4.5 CM (ref 3.5–6)
LEFT VENTRICULAR POSTERIOR WALL IN END DIASTOLE: 1.1 CM
LEFT VENTRICULAR STROKE VOLUME: 65 ML
LV EF US.2D.A4C+ESTIMATED: 68 %
LVSV (TEICH): 65 ML
MV E'TISSUE VEL-LAT: 13 CM/S
MV E'TISSUE VEL-SEP: 11 CM/S
MV PEAK A VEL: 0.85 M/S
MV PEAK E VEL: 100 CM/S
MV STENOSIS PRESSURE HALF TIME: 50 MS
MV VALVE AREA P 1/2 METHOD: 4.4
RA PRESSURE ESTIMATED: 8 MMHG
RIGHT ATRIUM AREA SYSTOLE A4C: 16.5 CM2
RIGHT VENTRICLE ID DIMENSION: 3.6 CM
RV PSP: 64 MMHG
SL CV LEFT ATRIUM LENGTH A2C: 5.4 CM
SL CV LV EF: 65
SL CV PED ECHO LEFT VENTRICLE DIASTOLIC VOLUME (MOD BIPLANE) 2D: 91 ML
SL CV PED ECHO LEFT VENTRICLE SYSTOLIC VOLUME (MOD BIPLANE) 2D: 26 ML
TR MAX PG: 56 MMHG
TR PEAK VELOCITY: 3.8 M/S
TRICUSPID ANNULAR PLANE SYSTOLIC EXCURSION: 2.7 CM
TRICUSPID VALVE PEAK REGURGITATION VELOCITY: 3.75 M/S

## 2025-04-18 PROCEDURE — 93306 TTE W/DOPPLER COMPLETE: CPT

## 2025-04-18 PROCEDURE — 93306 TTE W/DOPPLER COMPLETE: CPT | Performed by: STUDENT IN AN ORGANIZED HEALTH CARE EDUCATION/TRAINING PROGRAM

## 2025-04-18 RX ADMIN — PERFLUTREN 1.6 ML/MIN: 6.52 INJECTION, SUSPENSION INTRAVENOUS at 12:02

## 2025-04-28 ENCOUNTER — DOCUMENTATION (OUTPATIENT)
Dept: CARDIOLOGY CLINIC | Facility: CLINIC | Age: 50
End: 2025-04-28

## 2025-04-28 ENCOUNTER — OFFICE VISIT (OUTPATIENT)
Dept: CARDIOLOGY CLINIC | Facility: CLINIC | Age: 50
End: 2025-04-28
Payer: COMMERCIAL

## 2025-04-28 VITALS
SYSTOLIC BLOOD PRESSURE: 126 MMHG | OXYGEN SATURATION: 98 % | DIASTOLIC BLOOD PRESSURE: 72 MMHG | HEART RATE: 89 BPM | WEIGHT: 262 LBS | BODY MASS INDEX: 44.97 KG/M2

## 2025-04-28 DIAGNOSIS — I50.32 CHRONIC DIASTOLIC HEART FAILURE (HCC): Primary | ICD-10-CM

## 2025-04-28 DIAGNOSIS — Q23.81 BICUSPID AORTIC VALVE: ICD-10-CM

## 2025-04-28 DIAGNOSIS — I25.10 CORONARY ARTERY DISEASE INVOLVING NATIVE CORONARY ARTERY OF NATIVE HEART WITHOUT ANGINA PECTORIS: ICD-10-CM

## 2025-04-28 PROCEDURE — 99215 OFFICE O/P EST HI 40 MIN: CPT | Performed by: INTERNAL MEDICINE

## 2025-04-28 RX ORDER — FUROSEMIDE INJECTION 80 MG/ 10 ML 8 MG/ML
80 INJECTION SUBCUTANEOUS ONCE
Qty: 1 EACH | Refills: 0 | Status: SHIPPED | COMMUNITY
Start: 2025-04-28 | End: 2025-04-28

## 2025-04-28 RX ORDER — FUROSEMIDE 10 MG/ML
120 INJECTION INTRAMUSCULAR; INTRAVENOUS ONCE
Status: COMPLETED | OUTPATIENT
Start: 2025-04-28 | End: 2025-04-28

## 2025-04-28 RX ORDER — RIZATRIPTAN BENZOATE 10 MG/1
10 TABLET ORAL
COMMUNITY
Start: 2025-03-28 | End: 2026-03-28

## 2025-04-28 RX ADMIN — FUROSEMIDE 120 MG: 10 INJECTION INTRAMUSCULAR; INTRAVENOUS at 12:15

## 2025-04-28 NOTE — PROGRESS NOTES
Cardiology Outpatient Follow-Up Note - Naina Jamison 49 y.o. female MRN: [id number]      Assessment/Plan:    1. Chronic diastolic heart failure (HCC) (Primary)  She is still in the 260s from dry weight in 240s despite doubling of torsemide from 40 mg daily to BID last visit in Jan 2025  This is severe exacerbation of diastolic CHF likely with poor enteric absorption of oral loop diuretics  Can consider hospitalization for IV diuretics vs office administered IV diuretics. Will attempt office administered IV diuretics, beginning with lasix 20 mg IV once and repeat potentially next day  This may facilitate better absorption of her oral torsemide    2. Coronary artery disease involving native coronary artery of native heart without angina pectoris  Stable, with negative stress echo Feb 2025  Continue current regimen    3. Bicuspid aortic valve  Moderate stenosis by echo this month. Repeat in 1 years.         We will see Naina Jamison back in  6 months  for follow-up.    Subjective:     HPI: Naina Jamison is a 49 y.o. year old female with morbid obesity, T1DM, HLD, and premature CAD with NSTEMI and PCI to LAD in July 2023 presenting for follow-up.     She presented with atypical symptoms SLB and found to have elevated troponin > 4000. She underwent cardiac cath 7/5/23 which showed a mid-LAD 95% culprit lesion which was stented (Xience Skypoint 3 x 33 mm KSENIA). Echo showed normal LV function and mild-moderate AS. We followed up with DARRICK on 9/7/23 which confirmed congenitally bicuspid AV.     Seem by CHF NAPOLEON 1/30/25 and noted to have weight gain 240s -> 260s lbs. Her torsemide was increased from 40 mg daily to 40 mg BID (with potassium 20 mEq daily). Additionally, an echo stress test was ordered and done in Feb 2025 which was negative for ischemia.    She states she weighed in at 260 lbs at home today. She confirms in October she was weighing herself in the 240s. There has been no change with doubling her  torsemide from 40 mg daily to 40 mg BID.     She feels distended in her abdomen. She feels diffusely swollen. She gets chest  burning with exertion.         Cardiac Testing:    Echo 4/18/25  Interpretation Summary  Show Result Comparison     Left Ventricle: Left ventricular cavity size is normal. Wall thickness is mildly increased. There is concentric remodeling. The left ventricular ejection fraction is 65%. Systolic function is normal. Wall motion is normal. Diastolic function is normal.    Aortic Valve: The aortic valve is bicuspid. The leaflets are mildly thickened. The leaflets are moderately calcified. There is moderate stenosis. Aortic valve peak velocity is 2.88 m/s. AV peak gradient is 33 mmHg. AV mean gradient is 19 mmHg. DVI is 0.39. AV valve area is 1.34 cm2.    Tricuspid Valve: There is mild regurgitation.    Prior TTE study available for comparison. Prior study date: 4/25/2024. No significant changes noted compared to the prior study.             Echo stress 2/5/25    Interpretation Summary  Show Result Comparison     Left Ventricle: Left ventricular cavity size is normal. Wall thickness is mildly increased. There is mild concentric hypertrophy. The left ventricular ejection fraction is 60%. Systolic function is normal. Wall motion is normal.    Aortic Valve: The aortic valve is bicuspid. There is no evidence of regurgitation. There is mild stenosis. The aortic valve peak velocity is 2.79 m/s. The aortic valve peak gradient is 31 mmHg. The aortic valve mean gradient is 18 mmHg.    Stress ECG: The stress ECG is negative for ischemia after maximal exercise, without reproduction of symptoms.    Peak Stress Echo: Left ventricle cavity has normal reduction in size at peak stress. The left ventricle systolic function is normal at peak stress. The peak stress echo showed normal wall motion.    Echo Post Impression: The study is normal.     Results reviewed with patient.          Cardiac Cath  6/17/24  Impression         Stable CAD with patent prior stent and without new progressive CAD findings    LVEDP is normal without gradient on LV-AO pullback     Recommendation    Recommendation   Cont GDMT optimization for CAD  Cont risk factor reduction   on diet/lifestyle modification, referral to  Weight Mgmt program if amenable  Cardiac rehab upon discharge (complete all 36 sessions)        Holter 4/25/24     IMPRESSION:     Predominantly normal sinus during the study period with an average HR of 78 bpm ( bpm).  Rare supraventricular and ventricular ectopy burden, mostly in the form of single PACs.   Patient diary was attached. None of the reported symptoms correlated with any significant arrhythmias.             Echo 4/25/24    Interpretation Summary  Show Result Comparison     Left Ventricle: Left ventricular cavity size is normal. Wall thickness is normal. The left ventricular ejection fraction is 60%. Systolic function is normal. Although no diagnostic regional wall motion abnormality was identified, this possibility cannot be completely excluded on the basis of this study. Diastolic function is normal.    Aortic Valve: There is moderate stenosis. The aortic valve peak velocity is 2.72 m/s. The aortic valve peak gradient is 30 mmHg. The aortic valve mean gradient is 16 mmHg. The dimensionless velocity index is 0.38. The aortic valve area is 1.51 cm2.    Technically difficult study.  Definity could not be given due to inability to obtain IV access.          DARRICK 3D 9/7/23    Interpretation Summary         Left Ventricle: Left ventricular cavity size is normal. Wall thickness is normal. The left ventricular ejection fraction is 60%. Systolic function is normal. Wall motion is normal.    Left Atrium: The atrium is dilated. There is no thrombus.    Atrial Septum: There is no atrial septal defect. No patent foramen ovale detected, confirmed at rest using color doppler.    Aortic Valve: The aortic  valve is congenitally bicuspid. The leaflets are moderately calcified. There is mildly reduced mobility. There is systolic doming. There is moderate stenosis. The aortic valve peak velocity is 2.5 m/s. The aortic valve peak gradient is 24.0 mmHg. The aortic valve mean gradient is 15.0 mmHg. The dimensionless velocity index is 0.32. The aortic valve area is 1.08 cm2 by continuity VTI, and approximately 1.1 to 1.3 cm2 by planimetry. The stroke volume index is 30.40 ml/m2, suggestive of mildly decreased flow.    Aorta: The aortic root is normal in size. The ascending aorta is normal in size. The ascending aorta is 2.8 cm.     Bicuspid aortic valve anatomy demonstrated with leaflet calcification, a moderate degree of valvular stenosis, and minimal regurgitation. Aortic root and ascending aorta are normal in size.             Cardiac Cath 7/5/23   Impression         Mid LAD lesion is 95% stenosed. Culprit of NSTEMI. S/P successful IVUS-guided PCI to the mid LAD with KSENIA x 1. Mild reisdual CAD amenable to GDMT.    LVEDP normal without gradient on LV-AO pullback     Recommendation    Cont GDMT optimization for CAD  DAPT for at least 12 months  Aggressive risk factor reduction   on diet/lifestyle modification  Cardiac rehab upon discharge       Echo 7/5/23 Interpretation Summary         Left Ventricle: Left ventricular cavity size is normal. Wall thickness is normal. The left ventricular ejection fraction is 65%. Systolic function is normal. Diastolic function is normal.    The following segments are hypokinetic: apical septal, apical inferior and apex.    All other segments are normal.    Aortic Valve: The aortic valve is possibly bicuspid. The leaflets are not thickened. The leaflets are not calcified. The leaflets exhibit normal mobility. There is mild to moderate stenosis. The aortic valve mean gradient is 17 mmHg. The dimensionless velocity index is 0.41. The aortic valve area is 1.60 cm2.          EKGs,  personally reviewed:  6/10/24 - NSR, 75 bpm, normal study     Relevant Labs & Results:  CMP 7/26/23 -- reviewed, K 4.2, Cr 1.02  CBC 7/26/23 -- WNL  Lipid profile 7/05/23 -- LDL 33 mg/dL  HbA1c 7/5/23 -- 9.5%    CMP 5/8/24 - Cr 0.89, K 4.4  BNP 5/8/24 - 22  HbA1c 5/8/24 - 11%  Lipid panel 5/8/24 - LDL 49 mg/dL   Troponin 6/10/24 negative x3  BMP 6/10/24 - K 3.8, Cr 0.96  CBC 6/10/24 - Hgb 14.9 g/dL    CMP 2/7/25 - Na 137, K 4.4, Cr 1.02    ROS:  Review of Systems:  Review of Systems    Objective:     Vitals:   Vitals:    04/28/25 1140   BP: 126/72   BP Location: Right arm   Patient Position: Sitting   Cuff Size: Large   Pulse: 89   SpO2: 98%   Weight: 119 kg (262 lb)      Body surface area is 2.2 meters squared.  Wt Readings from Last 3 Encounters:   04/28/25 119 kg (262 lb)   04/18/25 117 kg (258 lb)   02/05/25 117 kg (258 lb)       Physical Exam:    General: Naina Jamison is a morbidly obese female, in no acute distress, sitting comfortably  HEENT: moist mucous membranes, EOMI  Neck:  No JVD, supple, trachea midline  Cardiovascular: unremarkable S1/S2, regular rate and rhythm, no murmurs, rubs or gallops  Pulmonary: normal respiratory effort, CTAB  Abdomen: soft and nondistended  Extremities: trace lower extremity edema. Warm and well perfused extremities.  Neuro: no focal motor deficits, AAOx3 (person, place, time)  Psych: Normal mood and affect, cooperative        Medications (at the START of this encounter):  Outpatient Medications Prior to Visit   Medication Sig Dispense Refill    ALPRAZolam (XANAX) 1 mg tablet Take 1 mg by mouth daily at bedtime as needed for anxiety Pt unable to verify dose for RN      aspirin (ECOTRIN LOW STRENGTH) 81 mg EC tablet Take 1 tablet (81 mg total) by mouth daily Do not start before July 7, 2023.  0    atorvastatin (LIPITOR) 40 mg tablet       cholecalciferol (VITAMIN D3) 1,000 units tablet Take 2,000 Units by mouth daily Pt unable to verify dose for RN      Continuous Blood  "Gluc Transmit (Guardian 4 Transmitter) MISC To check blood sugars continuously 1 each 0    HYDROcodone-acetaminophen (NORCO) 5-325 mg per tablet Take 1 tablet by mouth every 6 (six) hours as needed for pain Pt unable to verify dose for RN      insulin lispro (HumaLOG) 100 units/mL injection For insulin pump use, daily requirement of 200 units daily 180 mL 1    lisinopril (ZESTRIL) 10 mg tablet Take 1 tablet (10 mg total) by mouth daily 90 tablet 3    nitroglycerin (NITROSTAT) 0.4 mg SL tablet Place 1 tablet (0.4 mg total) under the tongue every 5 (five) minutes as needed for chest pain 30 tablet 2    ondansetron (ZOFRAN) 4 mg tablet Take 1 tablet (4 mg total) by mouth every 8 (eight) hours as needed for nausea or vomiting 20 tablet 0    PATIENT MAINTAINED INSULIN PUMP Inject 1 each under the skin every 8 (eight) hours      potassium chloride (Klor-Con M20) 20 mEq tablet Take 1 tablet (20 mEq total) by mouth daily 30 tablet 2    rizatriptan (MAXALT) 10 mg tablet Take 10 mg by mouth      torsemide (DEMADEX) 20 mg tablet Take 2 tablets (40 mg total) by mouth 2 (two) times a day 180 tablet 3    DULoxetine (CYMBALTA) 30 mg delayed release capsule Take 30 mg by mouth daily Pt unable to verify dose for RN (Patient not taking: Reported on 1/31/2025)      spironolactone (ALDACTONE) 25 mg tablet Take 1 tablet (25 mg total) by mouth daily 30 tablet 11     No facility-administered medications prior to visit.         This note was completed in part utilizing Dragon Medical One voice recognition software. Grammatical errors, random word insertion, spelling mistakes, occasional wrong word or \"sound-alike\" substitutions and incomplete sentences may be an occasional consequence of the system secondary to software limitations, ambient noise and hardware issues. At the time of dictation, efforts were made to edit, clarify and /or correct errors.  Please read the chart carefully and recognize, using context, where substitutions have " occurred.  If you have any questions or concerns about the context, text or information contained within the body of this dictation, please contact myself, the provider, for further clarification.

## 2025-04-28 NOTE — TELEPHONE ENCOUNTER
So she should hold torsemide tonight. Take furoscix 80 mg tomorrow AM and take her normal PM torsemide.   Per Dr. Newell's Secure chat message.

## 2025-04-28 NOTE — PROGRESS NOTES
Gave pt 120 mg of lasix without difficulty. IV d/c'd needle intact.    UO- 600 ml    Post BP- 122/54    Went over low sodium diet and fluid restriction.  She follows both. She is not sure what is causing the edema. Pt does have facial edema, upper extremities and abd is distended and firm.

## 2025-04-29 ENCOUNTER — TELEPHONE (OUTPATIENT)
Age: 50
End: 2025-04-29

## 2025-04-29 ENCOUNTER — TELEPHONE (OUTPATIENT)
Dept: CARDIOLOGY CLINIC | Facility: CLINIC | Age: 50
End: 2025-04-29

## 2025-04-29 NOTE — TELEPHONE ENCOUNTER
PA for furocix 80 mg/10 ml SUBMITTED to [employer]     via    []CMM-KEY:   [x]Surescripts-Case ID #   []Availity-Auth ID # NDC #   []Faxed to plan   []Other website   []Phone call Case ID #     []PA sent as URGENT    All office notes, labs and other pertaining documents and studies sent. Clinical questions answered. Awaiting determination from insurance company.     Turnaround time for your insurance to make a decision on your Prior Authorization can take 7-21 business days.

## 2025-04-29 NOTE — TELEPHONE ENCOUNTER
Spoke with pt. She lost 5 lbs. And did the furoscix this am.  She said half way through the infusion today she had urinated more then she has before. She said she felt a little nauseous after infusion.  Had a little to eat and feels better now.    She will take the torsemide this afternoon with potassium.      II will call her tomorrow to see if her wt has dropped more.

## 2025-04-29 NOTE — TELEPHONE ENCOUNTER
Received a prior auth for furoscix and need clarification if patient if patient has creatinine clearance of greater than 30 ? If the answer is no then I need clarification on Does the patient have an estimated glomerular filtration rate of less than 20 mL/min/1.73m^2? Please advise

## 2025-04-30 ENCOUNTER — TELEPHONE (OUTPATIENT)
Dept: CARDIOLOGY CLINIC | Facility: CLINIC | Age: 50
End: 2025-04-30

## 2025-04-30 ENCOUNTER — CLINICAL SUPPORT (OUTPATIENT)
Dept: CARDIOLOGY CLINIC | Facility: CLINIC | Age: 50
End: 2025-04-30

## 2025-04-30 VITALS
OXYGEN SATURATION: 96 % | SYSTOLIC BLOOD PRESSURE: 120 MMHG | HEART RATE: 85 BPM | DIASTOLIC BLOOD PRESSURE: 58 MMHG | WEIGHT: 260.1 LBS | BODY MASS INDEX: 44.41 KG/M2 | HEIGHT: 64 IN

## 2025-04-30 DIAGNOSIS — I50.33 ACUTE ON CHRONIC DIASTOLIC (CONGESTIVE) HEART FAILURE (HCC): Primary | ICD-10-CM

## 2025-04-30 DIAGNOSIS — I50.32 CHRONIC DIASTOLIC HEART FAILURE (HCC): Primary | ICD-10-CM

## 2025-04-30 RX ORDER — BLOOD-GLUCOSE SENSOR
EACH MISCELLANEOUS
COMMUNITY
Start: 2025-02-24

## 2025-04-30 NOTE — PROGRESS NOTES
Gave pt 120 mg IV lasix in Right hand without difficulty. IV d/c'd, needle intact.    UO-800 ml     Post /50    Dr. Santos saw pt before infusion of lasix. He directed her to take torsemide 80 mg bid.  Pt aware I will call her for f/u tomorrow.

## 2025-04-30 NOTE — TELEPHONE ENCOUNTER
Spoke with pt. Wt today is 259 and she was 262 on Monday.  Per Dr. Newell's instructions she need to come in for IV lasix again today.

## 2025-04-30 NOTE — TELEPHONE ENCOUNTER
PA for furoscix 80 mg/10 ml  DENIED ( I see it looks like patient did get the med however plan is stating it is denied)     Reason:(Screenshot if applicable)        Message sent to office clinical pool Yes    Denial letter scanned into Media Yes    We can gladly do an appeal but the process can take about 30-60 days to provide determination. Please Schedule a Peer to Peer at phone [phone]   . If an appeal is truly warranted please have Provider send clinical documentation to the PA department to support the appeal.     **Please follow up with your patient regarding denial and next steps**

## 2025-05-01 ENCOUNTER — TELEPHONE (OUTPATIENT)
Dept: CARDIOLOGY CLINIC | Facility: CLINIC | Age: 50
End: 2025-05-01

## 2025-05-01 NOTE — TELEPHONE ENCOUNTER
Per her plan patient would need to fill out the [employer] Consent form before an appeal can be submitted, it is attached to the denial letter under the media tab (page 6) once filled out scan back in and route back to the pod for processing

## 2025-05-01 NOTE — TELEPHONE ENCOUNTER
When I placed the Furocisx order today.  They asked me to faxed additional clinical information for the PA which I did send.  Does this mean that they will try for the PA as well?  Should we wait to see what they say before completing those forms attached to the denial?

## 2025-05-01 NOTE — TELEPHONE ENCOUNTER
Please send appeal.  Advise pt only takes this medication prn if overloaded and then would not take torsemide that day.    Thank you

## 2025-05-01 NOTE — TELEPHONE ENCOUNTER
Furoscix ordered placed today via website.  Additional clinical records faxed as well as per their request.

## 2025-05-02 ENCOUNTER — TELEPHONE (OUTPATIENT)
Dept: CARDIOLOGY CLINIC | Facility: CLINIC | Age: 50
End: 2025-05-02

## 2025-05-02 DIAGNOSIS — I50.33 ACUTE ON CHRONIC DIASTOLIC (CONGESTIVE) HEART FAILURE (HCC): Primary | ICD-10-CM

## 2025-05-02 LAB
BUN SERPL-MCNC: 20 MG/DL (ref 7–25)
BUN/CREAT SERPL: ABNORMAL (CALC) (ref 6–22)
CALCIUM SERPL-MCNC: 9.6 MG/DL (ref 8.6–10.2)
CHLORIDE SERPL-SCNC: 95 MMOL/L (ref 98–110)
CO2 SERPL-SCNC: 31 MMOL/L (ref 20–32)
CREAT SERPL-MCNC: 0.89 MG/DL (ref 0.5–0.99)
GFR/BSA.PRED SERPLBLD CYS-BASED-ARV: 80 ML/MIN/1.73M2
GLUCOSE SERPL-MCNC: 192 MG/DL (ref 65–99)
POTASSIUM SERPL-SCNC: 4.5 MMOL/L (ref 3.5–5.3)
SODIUM SERPL-SCNC: 135 MMOL/L (ref 135–146)

## 2025-05-02 NOTE — TELEPHONE ENCOUNTER
Spoke with pt. Wt 259 lbs down about 3 lbs. BMP- WNL except BS.  Pt taking the torsemide 80 mg bid since 5/1/25 per Dr. Santos via secure chat.

## 2025-05-05 NOTE — TELEPHONE ENCOUNTER
F/U call to patient who stated there is no difference in how she is feeling today. Feeling very uncomfortable, & swollen all over her body.   Stated her stomach is distended, & it feels tight & uncomfortable.  Experiencing b/l LE edema extending all the way up to her thighs. Has exertional dyspnea.  Today's Wt - 257 lb  5/2 Wt - 259 lb    Has been taking Torsemide 80 mg BID since 5/1. Received Furoscix in the mail today as ordered on 5/1 but has not taken.    Please advise.

## 2025-05-06 NOTE — TELEPHONE ENCOUNTER
F/U call to patient who identified herself on message. Left detailed message reading Dr Newell's instructions. Left c/b number & request to return call to office with any questions or concerns.  Also, advised to call office tomorrow with update of weight & symptoms.

## 2025-05-06 NOTE — TELEPHONE ENCOUNTER
Do you want patient to take PM dose of Torsemide 80 mg in addition to 5 hour  infusion of Furoscix ?    Returned call to patient & read her Dr Newell's orders for Furoscix.  Patient verbalized understanding.    Patient is asking if you want her to take her PM dose of Torsemide 80 mg in addition to Furoscix.    Stated she will not be able to take the 5 hour infusion of Furoscix until after her work today, around 5 PM.    Please advise

## 2025-05-07 NOTE — TELEPHONE ENCOUNTER
Spoke with pt. Her wt only came down 0.5 lbs. She is 256.5 lbs now.  Wt is down 6.5 lbs since we started all the lasix and increased torsemide.   She said her abd feels slightly less distended but still has edema and decrease appetite.    She does not want to go to the ER.   She does not feel the torsemide is doing anything.  Do you want to try torsemide 100 mg bid?  Do you want to try Bumex?    Please advise

## 2025-05-07 NOTE — TELEPHONE ENCOUNTER
Did received a message from [employer] that we did get coverage with additional clinical information with a copay of $15.00.  They were trying to reach out to patient to arrange for shipping.

## 2025-05-07 NOTE — TELEPHONE ENCOUNTER
F/U call to patient. Left a detailed message & c/b number with request for patient to return call to office.

## 2025-05-08 ENCOUNTER — CLINICAL SUPPORT (OUTPATIENT)
Dept: CARDIOLOGY CLINIC | Facility: CLINIC | Age: 50
End: 2025-05-08
Payer: COMMERCIAL

## 2025-05-08 VITALS
HEIGHT: 64 IN | SYSTOLIC BLOOD PRESSURE: 116 MMHG | HEART RATE: 85 BPM | WEIGHT: 260 LBS | DIASTOLIC BLOOD PRESSURE: 56 MMHG | OXYGEN SATURATION: 98 % | BODY MASS INDEX: 44.39 KG/M2

## 2025-05-08 DIAGNOSIS — I50.32 CHRONIC DIASTOLIC HEART FAILURE (HCC): Primary | ICD-10-CM

## 2025-05-08 PROCEDURE — 99211 OFF/OP EST MAY X REQ PHY/QHP: CPT

## 2025-05-08 PROCEDURE — 96374 THER/PROPH/DIAG INJ IV PUSH: CPT | Performed by: INTERNAL MEDICINE

## 2025-05-08 RX ORDER — FUROSEMIDE 10 MG/ML
120 INJECTION INTRAMUSCULAR; INTRAVENOUS ONCE
Status: COMPLETED | OUTPATIENT
Start: 2025-05-08 | End: 2025-05-08

## 2025-05-08 RX ADMIN — FUROSEMIDE 120 MG: 10 INJECTION INTRAMUSCULAR; INTRAVENOUS at 14:45

## 2025-05-08 NOTE — TELEPHONE ENCOUNTER
3 unsuccessful attempts to start IV. Tried twice in patient's right hand, once in patient's left wrist. Also, PENG Bill attempted to start IV x 2 unsuccessfully.     Private chatted Dr Newell who gave orders for patient to take Torsemide 100 mg this PM & take Furoscix tomorrow in the AM.     Called patient & gave her Dr Newell's orders & instructions. Patient verbalized understanding. Patient stated her Furoscix will be delivered tomorrow, hopefully in the AM.  Patient will call office tomorrow after her staff meeting around 11:00 AM to give update of her weight & symptoms..

## 2025-05-08 NOTE — TELEPHONE ENCOUNTER
Patient is scheduled for today @ 3 PM for IV Lasix 120 mg.    Will you please put the order for IV Lasix in patient's chart MAR.    Thank you.

## 2025-05-09 NOTE — TELEPHONE ENCOUNTER
F/U call to patient who stated her AM weight remains the same, although she feels a little improvement, less bloated, & when lying down, can breath better, not feeling like she's choking as she had felt.   Stated she took the AM Torsemide 100 mg & has voided large amounts of urine since.  Stated she did not receive the Furoscix in the mail but is supposed to be delivered today @ 1:30 PM. She will then give herself the Furoscix dose, infusing for 5 hours. She will also take the PM dose of Torsemide 100 mg.    Will f/u with patient on Monday for weights & symptoms. Patient will go for a repeat BMP on Monday or Tuesday.    Advised patient to call office if her symptoms do not improve or worsen or go to the ED to be evaluated.  Patient verbalized understanding.    Reviewed with patient her consult appointment with Dr Santos on 5/19.

## 2025-05-12 ENCOUNTER — TELEPHONE (OUTPATIENT)
Dept: CARDIOLOGY CLINIC | Facility: CLINIC | Age: 50
End: 2025-05-12

## 2025-05-12 DIAGNOSIS — I50.32 CHRONIC DIASTOLIC HEART FAILURE (HCC): Primary | ICD-10-CM

## 2025-05-12 NOTE — TELEPHONE ENCOUNTER
F/U call to patient who stated her weight has remained about the same.  Today's Wt- 256 lb  5/9 Wt - 257  5/7 Wt - 256.5 lb  Stated she feels a little less SOB & maybe a little less puffy but continues to feel she is retaining much fluid.  Has been taking Torsemide 100 mg BID since Thursday, 5/8.  Stated she got the Furoscix late Friday evening & took it Saturday, along with Torsemide 100 mg BID doses. Did void a lot during & after dose.  Will go to get her blood work tomorrow morning.    Please advise.

## 2025-05-13 NOTE — TELEPHONE ENCOUNTER
Returned call to patient who stated her weight today remains the same,   Wt - 256 lb.   Stated her symptoms are also the same.  Read to patient Dr Newell's instructions  Patient verbalized understanding.    Patient stated she went for blood work this morning. Blood work not resulted, still processing.  Will f/u with patient tomorrow with blood work results.

## 2025-05-14 ENCOUNTER — RESULTS FOLLOW-UP (OUTPATIENT)
Dept: CARDIOLOGY CLINIC | Facility: CLINIC | Age: 50
End: 2025-05-14

## 2025-05-14 DIAGNOSIS — I50.30 DIASTOLIC CONGESTIVE HEART FAILURE, UNSPECIFIED HF CHRONICITY (HCC): ICD-10-CM

## 2025-05-14 DIAGNOSIS — I50.32 CHRONIC HEART FAILURE WITH PRESERVED EJECTION FRACTION (HCC): ICD-10-CM

## 2025-05-14 LAB
BUN SERPL-MCNC: 24 MG/DL (ref 7–25)
BUN/CREAT SERPL: 15 (CALC) (ref 6–22)
CALCIUM SERPL-MCNC: 8.7 MG/DL (ref 8.6–10.2)
CHLORIDE SERPL-SCNC: 93 MMOL/L (ref 98–110)
CO2 SERPL-SCNC: 31 MMOL/L (ref 20–32)
CREAT SERPL-MCNC: 1.57 MG/DL (ref 0.5–0.99)
GFR/BSA.PRED SERPLBLD CYS-BASED-ARV: 41 ML/MIN/1.73M2
GLUCOSE SERPL-MCNC: 451 MG/DL (ref 65–99)
POTASSIUM SERPL-SCNC: 4.8 MMOL/L (ref 3.5–5.3)
SODIUM SERPL-SCNC: 134 MMOL/L (ref 135–146)

## 2025-05-14 RX ORDER — TORSEMIDE 20 MG/1
100 TABLET ORAL 2 TIMES DAILY
Qty: 300 TABLET | Refills: 3 | Status: SHIPPED | OUTPATIENT
Start: 2025-05-14

## 2025-05-14 NOTE — TELEPHONE ENCOUNTER
F/U call to patient. Read to her Dr Newell's orders & instructions.    Patient verbalized understanding.  Patient stated she needs Torsemide to be reordered..  Advised patient to call office or go to ED for any increase in CHF symptoms. Patient verbalized understanding.

## 2025-05-14 NOTE — TELEPHONE ENCOUNTER
F/U call to patient. Read to her Dr Newell's message & instructions.  Patient verbalized understanding, but stated she will hold off going to the ED.     Stated her symptoms are not as bad as they have been last year when she went to the hospital, 6/14/24 & was admitted. Stated she is not choking @ night when she is lying down. Stated if her symptoms worsen, she will go to the ED.  Educated patient on effects on her body & organs of fluid overload & encouraged her to go to the ED.   Patient stated she wants to hold off going to ED until after a consultation appointment with Dr Santos on Monday, 5/19. Stated she will go to ED before then if symptoms increase.    Is asking if she should remain on Torsemide 100 mg BID until that time.    Please advise.    Thank you.

## 2025-05-19 ENCOUNTER — TELEPHONE (OUTPATIENT)
Age: 50
End: 2025-05-19

## 2025-05-19 ENCOUNTER — CONSULT (OUTPATIENT)
Dept: CARDIOLOGY CLINIC | Facility: CLINIC | Age: 50
End: 2025-05-19
Payer: COMMERCIAL

## 2025-05-19 VITALS
HEART RATE: 93 BPM | SYSTOLIC BLOOD PRESSURE: 110 MMHG | OXYGEN SATURATION: 97 % | BODY MASS INDEX: 44.25 KG/M2 | DIASTOLIC BLOOD PRESSURE: 50 MMHG | HEIGHT: 64 IN | WEIGHT: 259.2 LBS

## 2025-05-19 DIAGNOSIS — I10 ESSENTIAL HYPERTENSION: ICD-10-CM

## 2025-05-19 DIAGNOSIS — Q23.81 BICUSPID AORTIC VALVE: ICD-10-CM

## 2025-05-19 DIAGNOSIS — I25.10 CORONARY ARTERY DISEASE INVOLVING NATIVE CORONARY ARTERY OF NATIVE HEART WITHOUT ANGINA PECTORIS: ICD-10-CM

## 2025-05-19 DIAGNOSIS — I50.32 CHRONIC DIASTOLIC HEART FAILURE (HCC): Primary | ICD-10-CM

## 2025-05-19 DIAGNOSIS — Q23.1 CONGENITAL INSUFFICIENCY OF AORTIC VALVE: ICD-10-CM

## 2025-05-19 PROCEDURE — 99204 OFFICE O/P NEW MOD 45 MIN: CPT | Performed by: INTERNAL MEDICINE

## 2025-05-19 RX ORDER — LISINOPRIL 5 MG/1
5 TABLET ORAL DAILY
Qty: 30 TABLET | Refills: 5 | Status: SHIPPED | OUTPATIENT
Start: 2025-05-19

## 2025-05-19 RX ORDER — METOPROLOL SUCCINATE 25 MG/1
25 TABLET, EXTENDED RELEASE ORAL DAILY
Qty: 30 TABLET | Refills: 3 | Status: SHIPPED | OUTPATIENT
Start: 2025-05-19

## 2025-05-19 NOTE — TELEPHONE ENCOUNTER
PA for  WEGOVY SUBMITTED to [employer]    via    []CMM-KEY:    [x]Surescripts-Case ID #    []Availity-Auth ID #  NDC #    []Faxed to plan   []Other website    []Phone call Case ID #      [x]PA sent as URGENT    All office notes, labs and other pertaining documents and studies sent. Clinical questions answered. Awaiting determination from insurance company.     Turnaround time for your insurance to make a decision on your Prior Authorization can take 7-21 business days.

## 2025-05-19 NOTE — PROGRESS NOTES
Outpatient Cardiology Consult Note - Naina Jamison 49 y.o. 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) 07/04/2023    Essential hypertension 07/04/2023    Fibromyalgia 07/04/2023    Morbid obesity 07/04/2023    Hyperlipidemia 07/04/2023    Anxiety 07/04/2023    Tick bites/exposure 07/04/2023    Hyperthyroidism 04/19/2018    Sleep apnea 10/29/2010    Esophageal reflux 06/28/2010       Assessment:  # Chronic HFpEF, Stage C w/ group 2 PH  Diuretic: torsemide 40 mg BID, spironolactone 25 mg daily  SGLT2i:  Weight: 259 lbs ( she thinks her dry weight is less)  BNP: 12    Studies- personally reviewed by me  Echo 4/18/25:  LVEF: 65%  RV: normal  AV: bicuspid, moderate AS, mean 19 mmHg  PASP: 64 mmHg    Stress Echo 2/5/25:  LVEF: 60%  AV mean 18 mmhg  Negative for ischemia    # bicuspid aortic valve, moderate AS  # premature CAD with NSTEMI and PCI to 95% mid LAD 7/5/23  Rx:  [facility] 6/17/24: stable CAD w/ patent LAD stent  LVEDP normal  # CKD, Cr 1.5- this is up on 5/13 from baseline around 1  # DM1, HGA1C 8.2% on 10/21/24  # morbid obesity  # fibromyalgia  ## ANNE-MARIE - uses CPAP  # anxiety    Today's Plan:  HFpEF with group 2 PH  BNP unreliable due to size  Can't use SGLT2i as DM1  Getting angina  with exertion, start Toprol XL 25 mg daily  Decrease lisinopril to 5 mg   Discussed diuretic     Recommend Wegovy for CAD, DM1, and obesity- all the indications for GLP1 agonist      HPI:     50 yo female with hx of obesity, T1DM, HLD, premature CAD with NSTEMI and  PCI to LAD July 2023, dealing with HFpEF. Last echo showed PASP in 60's mmHg. She is DM1, so cannot use SGLT2i.   She says she has cut back on her salt intake, but does say she drinks a lot of water.     Past Medical History:   Diagnosis Date    Bicuspid aortic valve     Diabetes mellitus (HCC)     Diastolic CHF (HCC)     Graves disease     High cholesterol     Hypertension        Review of Systems   Constitutional:  Negative for activity change, appetite change, fatigue and unexpected weight change.   HENT:  Negative for congestion and nosebleeds.    Eyes: Negative.    Respiratory:  Negative for cough, chest tightness and shortness of breath.    Cardiovascular:  Negative for chest pain, palpitations and leg swelling.   Gastrointestinal:  Negative for abdominal distention.   Endocrine: Negative.    Genitourinary: Negative.    Musculoskeletal: Negative.    Skin: Negative.    Neurological:  Negative for dizziness, syncope and weakness.   Hematological: Negative.    Psychiatric/Behavioral: Negative.         Allergies[1]  .  Current Medications[2]    Social History     Socioeconomic History    Marital status: /Civil Union     Spouse name: Not on file    Number of children: Not on file    Years of education: Not on file    Highest education level: Not on file   Occupational History    Not on file   Tobacco Use    Smoking status: Never    Smokeless tobacco: Never   Vaping Use    Vaping status: Never Used   Substance and Sexual Activity    Alcohol use: Never    Drug use: Never    Sexual activity: Not Currently   Other Topics Concern    Not on file   Social History Narrative    Not on file     Social Drivers of Health     Financial Resource Strain: Not At Risk (1/28/2025)    Received from [facility]    Financial Insecurity     In the last 12 months did you skip medications to save money?: No     In the last 12 months was there a time when you needed to see a doctor but could not because of cost?: No    Food Insecurity: No Food Insecurity (1/28/2025)    Received from [facility]    Food Insecurity     In the last 12 months did you ever eat less than you felt you should because there wasn't enough money for food?: No   Transportation Needs: No Transportation Needs (1/28/2025)    Received from [facility]    Transportation Needs     In the last 12 months have you ever had to go without healthcare because you didn't have a way to get there?: No   Physical Activity: Not on file   Stress: Not on file   Social Connections: Socially Integrated (1/28/2025)    Received from [facility]    Social Connection     Do you often feel lonely?: No   Intimate Partner Violence: Not on file   Housing Stability: Not At Risk (1/28/2025)    Received from [facility]    Housing Stability     Are you worried that in the next 2 months you may not have stable housing?: No       No family history on file.    Physical Exam:    Vitals: There were no vitals taken for this visit., There is no height or weight on file to calculate BMI.,   Wt Readings from Last 3 Encounters:   05/08/25 118 kg (260 lb)   04/30/25 118 kg (260 lb 1.6 oz)   04/28/25 119 kg (262 lb)       Physical Exam:  There were no vitals filed for this visit.    Physical Exam  Constitutional:       Appearance: She is well-developed.   HENT:      Head: Normocephalic and atraumatic.     Eyes:      Pupils: Pupils are equal, round, and reactive to light.     Neck:      Vascular: No JVD.     Cardiovascular:      Rate and Rhythm: Normal rate and regular rhythm.      Heart sounds: No murmur heard.  Pulmonary:      Effort: Pulmonary effort is normal. No respiratory distress.      Breath sounds: Normal breath sounds.   Abdominal:      General: There is no distension.      Palpations: Abdomen is soft.      Tenderness: There is no abdominal tenderness.     Musculoskeletal:         General: Normal range of motion.       Cervical back: Normal range of motion.     Skin:     General: Skin is warm and dry.      Findings: No rash.     Neurological:      Mental Status: She is alert and oriented to person, place, and time.         Labs & Results:    Lab Results   Component Value Date    WBC 11.20 (H) 06/15/2024    HGB 13.4 06/15/2024    HCT 40.0 06/15/2024    MCV 88 06/15/2024     06/15/2024     Lab Results   Component Value Date    SODIUM 134 (L) 05/13/2025    K 4.8 05/13/2025    CL 93 (L) 05/13/2025    CO2 31 05/13/2025    BUN 24 05/13/2025    CREATININE 1.57 (H) 05/13/2025    GLUC 451 (H) 05/13/2025    CALCIUM 8.7 05/13/2025     Lab Results   Component Value Date    BNP 12 02/07/2025      Lab Results   Component Value Date    CHOLESTEROL 140 06/17/2024    CHOLESTEROL 105 07/05/2023     Lab Results   Component Value Date    HDL 51 06/17/2024    HDL 57 07/05/2023     Lab Results   Component Value Date    TRIG 149 06/17/2024    TRIG 73 07/05/2023     Lab Results   Component Value Date    NONHDLC 48 07/05/2023             EKG personally reviewed by Bert Mcneill DO.     Counseling / Coordination of Care  Time spent today 40 minutes.  Greater than 50% of total time was spent with the patient and / or family counseling and / or coordination of care. We discussed diagnoses, most recent studies and any changes in treatment  Thank you for the opportunity to participate in the care of this patient.    BERT MCNEILL D.O.  DIRECTOR OF HEART FAILURE/ PULMONARY HYPERTENSION  MEDICAL DIRECTOR OF LVAD PROGRAM  [facility]         [1]   Allergies  Allergen Reactions    Ticagrelor Anaphylaxis     Made throat feel like it was closing    Milnacipran GI Intolerance    Liraglutide GI Intolerance     Heartburn, nausea.    Oxycodone-Acetaminophen Vomiting and GI Intolerance    Pregabalin Other (See Comments)     Didn't help   [2]   Current Outpatient Medications:     ALPRAZolam (XANAX) 1 mg tablet, Take 1 mg by mouth daily at  bedtime as needed for anxiety Pt unable to verify dose for RN, Disp: , Rfl:     aspirin (ECOTRIN LOW STRENGTH) 81 mg EC tablet, Take 1 tablet (81 mg total) by mouth daily Do not start before July 7, 2023., Disp: , Rfl: 0    atorvastatin (LIPITOR) 40 mg tablet, , Disp: , Rfl:     cholecalciferol (VITAMIN D3) 1,000 units tablet, Take 2,000 Units by mouth daily Pt unable to verify dose for RN, Disp: , Rfl:     Continuous Blood Gluc Transmit (Guardian 4 Transmitter) MISC, To check blood sugars continuously, Disp: 1 each, Rfl: 0    Continuous Glucose Sensor (Guardian 4 Glucose Sensor) MISC, , Disp: , Rfl:     DULoxetine (CYMBALTA) 30 mg delayed release capsule, Take 30 mg by mouth daily Pt unable to verify dose for RN (Patient not taking: Reported on 1/31/2025), Disp: , Rfl:     HYDROcodone-acetaminophen (NORCO) 5-325 mg per tablet, Take 1 tablet by mouth every 6 (six) hours as needed for pain Pt unable to verify dose for RN, Disp: , Rfl:     insulin lispro (HumaLOG) 100 units/mL injection, For insulin pump use, daily requirement of 200 units daily, Disp: 180 mL, Rfl: 1    lisinopril (ZESTRIL) 10 mg tablet, Take 1 tablet (10 mg total) by mouth daily, Disp: 90 tablet, Rfl: 3    nitroglycerin (NITROSTAT) 0.4 mg SL tablet, Place 1 tablet (0.4 mg total) under the tongue every 5 (five) minutes as needed for chest pain, Disp: 30 tablet, Rfl: 2    ondansetron (ZOFRAN) 4 mg tablet, Take 1 tablet (4 mg total) by mouth every 8 (eight) hours as needed for nausea or vomiting, Disp: 20 tablet, Rfl: 0    PATIENT MAINTAINED INSULIN PUMP, Inject 1 each under the skin every 8 (eight) hours, Disp: , Rfl:     potassium chloride (Klor-Con M20) 20 mEq tablet, Take 1 tablet (20 mEq total) by mouth daily, Disp: 30 tablet, Rfl: 2    rizatriptan (MAXALT) 10 mg tablet, Take 10 mg by mouth, Disp: , Rfl:     spironolactone (ALDACTONE) 25 mg tablet, Take 1 tablet (25 mg total) by mouth daily, Disp: 30 tablet, Rfl: 11    torsemide (DEMADEX) 20 mg  tablet, Take 5 tablets (100 mg total) by mouth 2 (two) times a day, Disp: 300 tablet, Rfl: 3

## 2025-05-19 NOTE — PATIENT INSTRUCTIONS
Decrease lisinopril to 5 mg     Start Toprol  to 25 mg daily    Discussed diuretic     Will try for Wegovy

## 2025-05-20 LAB
BUN SERPL-MCNC: 19 MG/DL (ref 7–25)
BUN/CREAT SERPL: 18 (CALC) (ref 6–22)
CALCIUM SERPL-MCNC: 9.3 MG/DL (ref 8.6–10.2)
CHLORIDE SERPL-SCNC: 97 MMOL/L (ref 98–110)
CO2 SERPL-SCNC: 28 MMOL/L (ref 20–32)
CREAT SERPL-MCNC: 1.08 MG/DL (ref 0.5–0.99)
GFR/BSA.PRED SERPLBLD CYS-BASED-ARV: 64 ML/MIN/1.73M2
GLUCOSE SERPL-MCNC: 246 MG/DL (ref 65–99)
POTASSIUM SERPL-SCNC: 4.1 MMOL/L (ref 3.5–5.3)
SODIUM SERPL-SCNC: 135 MMOL/L (ref 135–146)

## 2025-05-20 NOTE — TELEPHONE ENCOUNTER
PA for WEGOVY 0.25 MG/0.5 ML  EXCLUDED from plan       Reason:(Screenshot if applicable)        Message sent to office clinical pool Yes

## 2025-06-22 ENCOUNTER — HOSPITAL ENCOUNTER (EMERGENCY)
Facility: HOSPITAL | Age: 50
Discharge: HOME | End: 2025-06-22
Attending: EMERGENCY MEDICINE | Admitting: EMERGENCY MEDICINE
Payer: COMMERCIAL

## 2025-06-22 ENCOUNTER — OFFICE VISIT (OUTPATIENT)
Dept: URGENT CARE | Age: 50
End: 2025-06-22
Payer: COMMERCIAL

## 2025-06-22 VITALS
HEART RATE: 71 BPM | DIASTOLIC BLOOD PRESSURE: 67 MMHG | BODY MASS INDEX: 44.73 KG/M2 | SYSTOLIC BLOOD PRESSURE: 147 MMHG | RESPIRATION RATE: 20 BRPM | TEMPERATURE: 98.4 F | WEIGHT: 260.58 LBS | OXYGEN SATURATION: 99 %

## 2025-06-22 VITALS
RESPIRATION RATE: 20 BRPM | DIASTOLIC BLOOD PRESSURE: 82 MMHG | OXYGEN SATURATION: 97 % | SYSTOLIC BLOOD PRESSURE: 158 MMHG | TEMPERATURE: 98.1 F | HEART RATE: 77 BPM

## 2025-06-22 DIAGNOSIS — R51.9 ACUTE HEADACHE: Primary | ICD-10-CM

## 2025-06-22 DIAGNOSIS — M62.838 TRAPEZIUS MUSCLE SPASM: ICD-10-CM

## 2025-06-22 DIAGNOSIS — I10 HYPERTENSION: ICD-10-CM

## 2025-06-22 DIAGNOSIS — M54.2 NECK PAIN: ICD-10-CM

## 2025-06-22 DIAGNOSIS — G44.89 OTHER HEADACHE SYNDROME: Primary | ICD-10-CM

## 2025-06-22 PROCEDURE — 99283 EMERGENCY DEPT VISIT LOW MDM: CPT

## 2025-06-22 PROCEDURE — 99284 EMERGENCY DEPT VISIT MOD MDM: CPT | Performed by: EMERGENCY MEDICINE

## 2025-06-22 PROCEDURE — 96365 THER/PROPH/DIAG IV INF INIT: CPT

## 2025-06-22 PROCEDURE — 96375 TX/PRO/DX INJ NEW DRUG ADDON: CPT

## 2025-06-22 PROCEDURE — 96361 HYDRATE IV INFUSION ADD-ON: CPT

## 2025-06-22 PROCEDURE — S9083 URGENT CARE CENTER GLOBAL: HCPCS | Performed by: PHYSICIAN ASSISTANT

## 2025-06-22 PROCEDURE — G0382 LEV 3 HOSP TYPE B ED VISIT: HCPCS | Performed by: PHYSICIAN ASSISTANT

## 2025-06-22 RX ORDER — DIPHENHYDRAMINE HYDROCHLORIDE 50 MG/ML
25 INJECTION, SOLUTION INTRAMUSCULAR; INTRAVENOUS ONCE
Status: COMPLETED | OUTPATIENT
Start: 2025-06-22 | End: 2025-06-22

## 2025-06-22 RX ORDER — MAGNESIUM SULFATE HEPTAHYDRATE 40 MG/ML
2 INJECTION, SOLUTION INTRAVENOUS ONCE
Status: COMPLETED | OUTPATIENT
Start: 2025-06-22 | End: 2025-06-22

## 2025-06-22 RX ORDER — METHOCARBAMOL 500 MG/1
500 TABLET, FILM COATED ORAL 2 TIMES DAILY
Qty: 20 TABLET | Refills: 0 | Status: SHIPPED | OUTPATIENT
Start: 2025-06-22

## 2025-06-22 RX ORDER — METOCLOPRAMIDE 10 MG/1
10 TABLET ORAL EVERY 6 HOURS PRN
Qty: 8 TABLET | Refills: 0 | Status: SHIPPED | OUTPATIENT
Start: 2025-06-22

## 2025-06-22 RX ORDER — LIDOCAINE 50 MG/G
1 PATCH TOPICAL ONCE
Status: DISCONTINUED | OUTPATIENT
Start: 2025-06-22 | End: 2025-06-22 | Stop reason: HOSPADM

## 2025-06-22 RX ORDER — NAPROXEN 250 MG/1
250 TABLET ORAL
Qty: 21 TABLET | Refills: 0 | Status: SHIPPED | OUTPATIENT
Start: 2025-06-22 | End: 2025-06-29

## 2025-06-22 RX ORDER — KETOROLAC TROMETHAMINE 30 MG/ML
30 INJECTION, SOLUTION INTRAMUSCULAR; INTRAVENOUS ONCE
Status: COMPLETED | OUTPATIENT
Start: 2025-06-22 | End: 2025-06-22

## 2025-06-22 RX ORDER — LIDOCAINE 40 MG/G
CREAM TOPICAL AS NEEDED
Qty: 30 G | Refills: 0 | Status: SHIPPED | OUTPATIENT
Start: 2025-06-22

## 2025-06-22 RX ORDER — METOCLOPRAMIDE HYDROCHLORIDE 5 MG/ML
10 INJECTION INTRAMUSCULAR; INTRAVENOUS ONCE
Status: COMPLETED | OUTPATIENT
Start: 2025-06-22 | End: 2025-06-22

## 2025-06-22 RX ADMIN — LIDOCAINE 5% 1 PATCH: 700 PATCH TOPICAL at 11:46

## 2025-06-22 RX ADMIN — DIPHENHYDRAMINE HYDROCHLORIDE 25 MG: 50 INJECTION, SOLUTION INTRAMUSCULAR; INTRAVENOUS at 11:44

## 2025-06-22 RX ADMIN — KETOROLAC TROMETHAMINE 30 MG: 30 INJECTION, SOLUTION INTRAMUSCULAR; INTRAVENOUS at 11:44

## 2025-06-22 RX ADMIN — MAGNESIUM SULFATE HEPTAHYDRATE 2 G: 40 INJECTION, SOLUTION INTRAVENOUS at 11:46

## 2025-06-22 RX ADMIN — SODIUM CHLORIDE 1000 ML: 0.9 INJECTION, SOLUTION INTRAVENOUS at 11:42

## 2025-06-22 RX ADMIN — METOCLOPRAMIDE 10 MG: 5 INJECTION, SOLUTION INTRAMUSCULAR; INTRAVENOUS at 11:46

## 2025-06-22 NOTE — PROGRESS NOTES
[facility]        NAME: Naina Jamison is a 50 y.o. female  : 1975    MRN: [id number]  DATE: 2025  TIME: 10:04 AM    There were no vitals taken for this visit.    Assessment and Plan   No primary diagnosis found.  No diagnosis found.      Patient Instructions       Follow up with PCP in 3-5 days.  Proceed to  ER if symptoms worsen.    Chief Complaint   No chief complaint on file.        History of Present Illness       Pt with occipital headache x 4-5 days constant  now with pain down into neck , can't turn head, now with nausea,  has taken migraine medication several times without any type or relief     Headache  Neck Pain   Associated symptoms include headaches.       Review of Systems   Review of Systems   Constitutional: Negative.    Musculoskeletal:  Positive for neck pain.   Neurological:  Positive for headaches.   All other systems reviewed and are negative.        Current Medications     Current Medications[1]    Current Allergies     Allergies as of 2025 - Reviewed 2025   Allergen Reaction Noted    Ticagrelor Anaphylaxis 2024    Milnacipran GI Intolerance 03/10/2017    Liraglutide GI Intolerance 02/15/2016    Oxycodone Other (See Comments) 2025    Oxycodone-acetaminophen Vomiting and GI Intolerance 2015    Pregabalin Other (See Comments) 03/10/2017            The following portions of the patient's history were reviewed and updated as appropriate: allergies, current medications, past family history, past medical history, past social history, past surgical history and problem list.     Past Medical History[2]    Past Surgical History[3]    Family History[4]      Medications have been verified.        Objective   There were no vitals taken for this visit.       Physical Exam     Physical Exam  Vitals and nursing note reviewed.   Constitutional:       Appearance: Normal appearance. She is normal weight.      Comments: Pt stating this is her worst  "headache by far,  has only had \"migraine headache \" 2-3 times  but this is far worse, no relief with migraine headaches meds, and now with nausea  pt will go to er for further eval possible iv meds possible ct scan    HENT:      Head: Normocephalic and atraumatic.      Right Ear: Tympanic membrane, ear canal and external ear normal.      Left Ear: Tympanic membrane, ear canal and external ear normal.      Nose: Nose normal.      Mouth/Throat:      Mouth: Mucous membranes are moist.     Eyes:      Extraocular Movements: Extraocular movements intact.      Conjunctiva/sclera: Conjunctivae normal.      Pupils: Pupils are equal, round, and reactive to light.     Neck:      Comments: Too painful to turn head    Cardiovascular:      Rate and Rhythm: Normal rate and regular rhythm.      Pulses: Normal pulses.      Heart sounds: Normal heart sounds.   Pulmonary:      Effort: Pulmonary effort is normal.      Breath sounds: Normal breath sounds.   Abdominal:      Palpations: Abdomen is soft.     Musculoskeletal:         General: Normal range of motion.      Cervical back: Normal range of motion and neck supple.     Skin:     General: Skin is warm.      Capillary Refill: Capillary refill takes less than 2 seconds.     Neurological:      General: No focal deficit present.      Mental Status: She is alert and oriented to person, place, and time.                          [1]   Current Outpatient Medications:     ALPRAZolam (XANAX) 1 mg tablet, Take 1 mg by mouth daily at bedtime as needed for anxiety Pt unable to verify dose for RN, Disp: , Rfl:     aspirin (ECOTRIN LOW STRENGTH) 81 mg EC tablet, Take 1 tablet (81 mg total) by mouth daily Do not start before July 7, 2023., Disp: , Rfl: 0    atorvastatin (LIPITOR) 40 mg tablet, , Disp: , Rfl:     cholecalciferol (VITAMIN D3) 1,000 units tablet, Take 2,000 Units by mouth in the morning. Pt unable to verify dose for RN., Disp: , Rfl:     Continuous Blood Gluc Transmit (Guardian 4 " Transmitter) MISC, To check blood sugars continuously, Disp: 1 each, Rfl: 0    Continuous Glucose Sensor (Guardian 4 Glucose Sensor) MISC, , Disp: , Rfl:     DULoxetine (CYMBALTA) 30 mg delayed release capsule, Take 30 mg by mouth daily Pt unable to verify dose for RN (Patient not taking: Reported on 1/31/2025), Disp: , Rfl:     HYDROcodone-acetaminophen (NORCO) 5-325 mg per tablet, Take 1 tablet by mouth every 6 (six) hours as needed for pain Pt unable to verify dose for RN, Disp: , Rfl:     insulin lispro (HumaLOG) 100 units/mL injection, For insulin pump use, daily requirement of 200 units daily, Disp: 180 mL, Rfl: 1    lisinopril (ZESTRIL) 5 mg tablet, Take 1 tablet (5 mg total) by mouth daily, Disp: 30 tablet, Rfl: 5    metoprolol succinate (TOPROL-XL) 25 mg 24 hr tablet, Take 1 tablet (25 mg total) by mouth daily, Disp: 30 tablet, Rfl: 3    nitroglycerin (NITROSTAT) 0.4 mg SL tablet, Place 1 tablet (0.4 mg total) under the tongue every 5 (five) minutes as needed for chest pain, Disp: 30 tablet, Rfl: 2    ondansetron (ZOFRAN) 4 mg tablet, Take 1 tablet (4 mg total) by mouth every 8 (eight) hours as needed for nausea or vomiting, Disp: 20 tablet, Rfl: 0    PATIENT MAINTAINED INSULIN PUMP, Inject 1 each under the skin every 8 (eight) hours, Disp: , Rfl:     potassium chloride (Klor-Con M20) 20 mEq tablet, Take 1 tablet (20 mEq total) by mouth daily, Disp: 30 tablet, Rfl: 2    rizatriptan (MAXALT) 10 mg tablet, Take 10 mg by mouth as needed, Disp: , Rfl:     Semaglutide-Weight Management (WEGOVY) 0.5 MG/0.5ML, Inject 0.5 mL (0.5 mg total) under the skin once a week, Disp: 2 mL, Rfl: 3    spironolactone (ALDACTONE) 25 mg tablet, Take 1 tablet (25 mg total) by mouth daily, Disp: 30 tablet, Rfl: 11    torsemide (DEMADEX) 20 mg tablet, Take 5 tablets (100 mg total) by mouth 2 (two) times a day (Patient taking differently: Take 20 mg by mouth 2 (two) times a day Taking two tablets BID.), Disp: 300 tablet, Rfl: 3  [2]    Past Medical History:  Diagnosis Date    Bicuspid aortic valve     Diabetes mellitus (HCC)     Diastolic CHF (HCC)     Graves disease     High cholesterol     Hypertension    [3]   Past Surgical History:  Procedure Laterality Date    ABDOMINAL SURGERY      Gallbladder removal    CARDIAC CATHETERIZATION Left 7/5/2023    Procedure: Cardiac Left Heart Cath;  Surgeon: Elba Nevarez DO;  Location: [facility];  Service: Cardiology    CARDIAC CATHETERIZATION N/A 7/5/2023    Procedure: Cardiac Coronary Angiogram;  Surgeon: Elba Nevarez DO;  Location: [facility];  Service: Cardiology    CARDIAC CATHETERIZATION Left 6/17/2024    Procedure: Cardiac Left Heart Cath;  Surgeon: Elba Nevarez DO;  Location: [facility];  Service: Cardiology    CARDIAC CATHETERIZATION N/A 6/17/2024    Procedure: Cardiac Coronary Angiogram;  Surgeon: Elba Nevarez DO;  Location: [facility];  Service: Cardiology    HYSTERECTOMY      US GUIDED THYROID BIOPSY  10/4/2018   [4] No family history on file.     CATH LAB;  Service: Cardiology    CARDIAC CATHETERIZATION N/A 7/5/2023    Procedure: Cardiac Coronary Angiogram;  Surgeon: Elba Nevarez DO;  [facility]: [facility];  Service: Cardiology    CARDIAC CATHETERIZATION Left 6/17/2024    Procedure: Cardiac Left Heart Cath;  Surgeon: Elba Nevarez DO;  [facility]: [facility];  Service: Cardiology    CARDIAC CATHETERIZATION N/A 6/17/2024    Procedure: Cardiac Coronary Angiogram;  Surgeon: Elba Nevarez DO;  Location: [facility];  Service: Cardiology    HYSTERECTOMY      US GUIDED THYROID BIOPSY  10/4/2018   [4] No family history on file.

## 2025-06-22 NOTE — Clinical Note
Naina Jamison was seen and treated in our emergency department on 6/22/2025.    No restrictions            Diagnosis:     Naina  may return to work on return date.    She may return on this date: 06/24/2025         If you have any questions or concerns, please don't hesitate to call.      Kennedy Baird MD    ______________________________           _______________          _______________  Hospital Representative                              Date                                Time

## 2025-06-22 NOTE — ED PROVIDER NOTES
Time reflects when diagnosis was documented in both MDM as applicable and the Disposition within this note       Time User Action Codes Description Comment    6/22/2025  1:20 PM Kennedy Baird Add [R51.9] Acute headache     6/22/2025  1:21 PM Kennedy Baird Add [M62.838] Trapezius muscle spasm     6/22/2025  1:21 PM Kennedy Baird Add [I10] Hypertension           ED Disposition       ED Disposition   Discharge    Condition   Stable    Date/Time   Sun Jun 22, 2025  1:20 PM    Comment   Naina Jamison discharge to home/self care.                   Assessment & Plan       Medical Decision Making  Acute headache with absence of red flag signs or symptoms to suggest acute CNS bleeding, CNS mass, arterial dissection, and imaging not indicated.  Will treat headache, reassess.    Neck pain consistent with trapezius muscle spasm-will reassure, counts, treat symptoms.  CT is not indicated.    Amount and/or Complexity of Data Reviewed  Labs: ordered.    Risk  OTC drugs.  Prescription drug management.        ED Course as of 06/22/25 1419   Sun Jun 22, 2025   1318 Patient reports resolution of headache will DC to home.       Medications   lidocaine (LIDODERM) 5 % patch 1 patch (1 patch Topical Medication Applied 6/22/25 1146)   ketorolac (TORADOL) injection 30 mg (30 mg Intravenous Given 6/22/25 1144)   metoclopramide (REGLAN) injection 10 mg (10 mg Intravenous Given 6/22/25 1146)   diphenhydrAMINE (BENADRYL) injection 25 mg (25 mg Intravenous Given 6/22/25 1144)   magnesium sulfate 2 g/50 mL IVPB (premix) 2 g (0 g Intravenous Stopped 6/22/25 1244)   sodium chloride 0.9 % bolus 1,000 mL (0 mL Intravenous Stopped 6/22/25 1327)       ED Risk Strat Scores                    No data recorded                            History of Present Illness       Chief Complaint   Patient presents with    Neck Pain     Neck pain since Wednesday. Pt states she is unable to move her neck in the full ROM. Associated s/x of headache. Pt went to  urgent care and recommend to come to ED        Past Medical History[1]   Past Surgical History[2]   Family History[3]   Social History[4]   E-Cigarette/Vaping    E-Cigarette Use Never User       E-Cigarette/Vaping Substances    Nicotine No     THC No     CBD No     Flavoring No     Other No     Unknown No       I have reviewed and agree with the history as documented.     42-year-old female presents for relation of neck pain and headache.  Patient states roughly 4 to 5 days ago she woke up feeling her neck was stiff like she had slept funny.  Today as a day progressed neck became more stiff, painful,.  The pain is constant, nondraining, worse with moving her head.  She states that as this progressed she began to have a pressure-like headache which wraparound her head of like a squeezing from the outside in.  Headache is been constant and unchanged.  Worse with moving her neck.  No focal neurodeficits or weakness, cough neurosymptoms recent falls or head trauma, vertigo.      History provided by:  Patient      Review of Systems   Constitutional:  Negative for activity change, appetite change, fatigue and fever.   HENT:  Negative for congestion, dental problem, ear pain, rhinorrhea and sore throat.    Eyes:  Negative for pain and redness.   Respiratory:  Negative for chest tightness, shortness of breath and wheezing.    Cardiovascular:  Negative for chest pain and palpitations.   Gastrointestinal:  Positive for nausea. Negative for abdominal pain, blood in stool, constipation, diarrhea and vomiting.   Endocrine: Negative for cold intolerance and heat intolerance.   Genitourinary:  Negative for dysuria, frequency and hematuria.   Musculoskeletal:  Positive for neck pain and neck stiffness. Negative for arthralgias and myalgias.   Skin:  Negative for color change, pallor and rash.   Neurological:  Positive for headaches. Negative for weakness and numbness.   Hematological:  Does not bruise/bleed easily.    Psychiatric/Behavioral:  Negative for agitation, hallucinations and suicidal ideas.            Objective       ED Triage Vitals   Temperature Pulse Blood Pressure Respirations SpO2 Patient Position - Orthostatic VS   06/22/25 1043 06/22/25 1043 06/22/25 1043 06/22/25 1043 06/22/25 1043 06/22/25 1043   98.4 °F (36.9 °C) 71 147/67 20 99 % Sitting      Temp Source Heart Rate Source BP Location FiO2 (%) Pain Score    06/22/25 1043 06/22/25 1043 06/22/25 1043 -- 06/22/25 1108    Oral Monitor Right arm  10 - Worst Possible Pain      Vitals      Date and Time Temp Pulse SpO2 Resp BP Pain Score FACES Pain Rating User   06/22/25 1108 -- -- -- -- -- 10 - Worst Possible Pain -- DIC   06/22/25 1043 98.4 °F (36.9 °C) 71 99 % 20 147/67 -- -- ML            Physical Exam  Constitutional:       Appearance: She is well-developed.   HENT:      Head: Normocephalic and atraumatic.     Eyes:      Pupils: Pupils are equal, round, and reactive to light.      Comments: Normal funduscopic exam   Neck:      Vascular: No JVD.      Trachea: No tracheal deviation.      Comments: Nontender palpation of cervical thoracic spines.  Patient is tender palpation with hypertonicity of bilateral trapezius muscles.  Cardiovascular:      Rate and Rhythm: Normal rate and regular rhythm.   Pulmonary:      Effort: No tachypnea, accessory muscle usage or respiratory distress.   Abdominal:      General: There is no distension.     Musculoskeletal:      Right lower leg: Normal.      Left lower leg: Normal.     Skin:     General: Skin is warm.      Capillary Refill: Capillary refill takes less than 2 seconds.     Neurological:      General: No focal deficit present.      Mental Status: She is alert and oriented to person, place, and time.      Cranial Nerves: No cranial nerve deficit.      Sensory: No sensory deficit.      Motor: No weakness.      Coordination: Coordination normal.      Gait: Gait normal.     Psychiatric:         Behavior: Behavior normal.          Results Reviewed       Procedure Component Value Units Date/Time    POCT pregnancy, urine [[id number]]     Lab Status: No result             No orders to display       Procedures    ED Medication and Procedure Management   Prior to Admission Medications   Prescriptions Last Dose Informant Patient Reported? Taking?   ALPRAZolam (XANAX) 1 mg tablet  Self Yes No   Sig: Take 1 mg by mouth daily at bedtime as needed for anxiety Pt unable to verify dose for RN   Continuous Blood Gluc Transmit (Guardian 4 Transmitter) MISC  Self No No   Sig: To check blood sugars continuously   Continuous Glucose Sensor (Guardian 4 Glucose Sensor) MISC  Self Yes No   DULoxetine (CYMBALTA) 30 mg delayed release capsule  Self Yes No   Sig: Take 30 mg by mouth daily Pt unable to verify dose for RN   HYDROcodone-acetaminophen (NORCO) 5-325 mg per tablet  Self Yes No   Sig: Take 1 tablet by mouth every 6 (six) hours as needed for pain Pt unable to verify dose for RN   PATIENT MAINTAINED INSULIN PUMP  Self Yes No   Sig: Inject 1 each under the skin every 8 (eight) hours   Semaglutide-Weight Management (WEGOVY) 0.5 MG/0.5ML   No No   Sig: Inject 0.5 mL (0.5 mg total) under the skin once a week   aspirin (ECOTRIN LOW STRENGTH) 81 mg EC tablet  Self No No   Sig: Take 1 tablet (81 mg total) by mouth daily Do not start before July 7, 2023.   atorvastatin (LIPITOR) 40 mg tablet  Self Yes No   Patient not taking: Reported on 5/19/2025   cholecalciferol (VITAMIN D3) 1,000 units tablet  Self Yes No   Sig: Take 2,000 Units by mouth in the morning. Pt unable to verify dose for RN.   insulin lispro (HumaLOG) 100 units/mL injection  Self No No   Sig: For insulin pump use, daily requirement of 200 units daily   lisinopril (ZESTRIL) 5 mg tablet   No No   Sig: Take 1 tablet (5 mg total) by mouth daily   metoprolol succinate (TOPROL-XL) 25 mg 24 hr tablet   No No   Sig: Take 1 tablet (25 mg total) by mouth daily   nitroglycerin (NITROSTAT) 0.4 mg SL  tablet  Self No No   Sig: Place 1 tablet (0.4 mg total) under the tongue every 5 (five) minutes as needed for chest pain   ondansetron (ZOFRAN) 4 mg tablet  Self No No   Sig: Take 1 tablet (4 mg total) by mouth every 8 (eight) hours as needed for nausea or vomiting   potassium chloride (Klor-Con M20) 20 mEq tablet  Self No No   Sig: Take 1 tablet (20 mEq total) by mouth daily   rizatriptan (MAXALT) 10 mg tablet  Self Yes No   Sig: Take 10 mg by mouth as needed   spironolactone (ALDACTONE) 25 mg tablet  Self No No   Sig: Take 1 tablet (25 mg total) by mouth daily   torsemide (DEMADEX) 20 mg tablet  Self No No   Sig: Take 5 tablets (100 mg total) by mouth 2 (two) times a day      Facility-Administered Medications: None     Discharge Medication List as of 6/22/2025  1:23 PM        START taking these medications    Details   lidocaine (LMX) 4 % cream Apply topically as needed for moderate pain, Starting Sun 6/22/2025, Normal      methocarbamol (ROBAXIN) 500 mg tablet Take 1 tablet (500 mg total) by mouth 2 (two) times a day, Starting Sun 6/22/2025, Normal      metoclopramide (REGLAN) 10 mg tablet Take 1 tablet (10 mg total) by mouth every 6 (six) hours as needed (headache), Starting Sun 6/22/2025, Normal      naproxen (NAPROSYN) 250 mg tablet Take 1 tablet (250 mg total) by mouth 3 (three) times a day with meals for 7 days, Starting Sun 6/22/2025, Until Sun 6/29/2025, Normal           CONTINUE these medications which have NOT CHANGED    Details   ALPRAZolam (XANAX) 1 mg tablet Take 1 mg by mouth daily at bedtime as needed for anxiety Pt unable to verify dose for RN, Historical Med      aspirin (ECOTRIN LOW STRENGTH) 81 mg EC tablet Take 1 tablet (81 mg total) by mouth daily Do not start before July 7, 2023., Starting Fri 7/7/2023, No Print      atorvastatin (LIPITOR) 40 mg tablet Historical Med      cholecalciferol (VITAMIN D3) 1,000 units tablet Take 2,000 Units by mouth in the morning. Pt unable to verify dose for  RN., Historical Med      Continuous Blood Gluc Transmit (Guardian 4 Transmitter) MISC To check blood sugars continuously, Normal      Continuous Glucose Sensor (Guardian 4 Glucose Sensor) MISC Historical Med      DULoxetine (CYMBALTA) 30 mg delayed release capsule Take 30 mg by mouth daily Pt unable to verify dose for RN, Historical Med      HYDROcodone-acetaminophen (NORCO) 5-325 mg per tablet Take 1 tablet by mouth every 6 (six) hours as needed for pain Pt unable to verify dose for RN, Historical Med      insulin lispro (HumaLOG) 100 units/mL injection For insulin pump use, daily requirement of 200 units daily, Normal      lisinopril (ZESTRIL) 5 mg tablet Take 1 tablet (5 mg total) by mouth daily, Starting Mon 5/19/2025, Normal      metoprolol succinate (TOPROL-XL) 25 mg 24 hr tablet Take 1 tablet (25 mg total) by mouth daily, Starting Mon 5/19/2025, Normal      nitroglycerin (NITROSTAT) 0.4 mg SL tablet Place 1 tablet (0.4 mg total) under the tongue every 5 (five) minutes as needed for chest pain, Starting Wed 7/12/2023, Normal      ondansetron (ZOFRAN) 4 mg tablet Take 1 tablet (4 mg total) by mouth every 8 (eight) hours as needed for nausea or vomiting, Starting Fri 8/16/2024, Normal      PATIENT MAINTAINED INSULIN PUMP Inject 1 each under the skin every 8 (eight) hours, Historical Med      potassium chloride (Klor-Con M20) 20 mEq tablet Take 1 tablet (20 mEq total) by mouth daily, Starting Thu 1/30/2025, Normal      rizatriptan (MAXALT) 10 mg tablet Take 10 mg by mouth as needed, Starting Fri 3/28/2025, Until Sat 3/28/2026 at 2359, Historical Med      Semaglutide-Weight Management (WEGOVY) 0.5 MG/0.5ML Inject 0.5 mL (0.5 mg total) under the skin once a week, Starting Mon 5/19/2025, Normal      spironolactone (ALDACTONE) 25 mg tablet Take 1 tablet (25 mg total) by mouth daily, Starting Thu 6/13/2024, Normal      torsemide (DEMADEX) 20 mg tablet Take 5 tablets (100 mg total) by mouth 2 (two) times a day,  Starting Wed 5/14/2025, Normal           No discharge procedures on file.  ED SEPSIS DOCUMENTATION   Time reflects when diagnosis was documented in both MDM as applicable and the Disposition within this note       Time User Action Codes Description Comment    6/22/2025  1:20 PM Kennedy Baird [R51.9] Acute headache     6/22/2025  1:21 PM Kennedy Baird [M62.838] Trapezius muscle spasm     6/22/2025  1:21 PM Kennedy Baird [I10] Hypertension                    [1]   Past Medical History:  Diagnosis Date    Bicuspid aortic valve     Diabetes mellitus (HCC)     Diastolic CHF (HCC)     Graves disease     High cholesterol     Hypertension    [2]   Past Surgical History:  Procedure Laterality Date    ABDOMINAL SURGERY      Gallbladder removal    CARDIAC CATHETERIZATION Left 7/5/2023    Procedure: Cardiac Left Heart Cath;  Surgeon: Elba Nevarez DO;  Location: [facility];  Service: Cardiology    CARDIAC CATHETERIZATION N/A 7/5/2023    Procedure: Cardiac Coronary Angiogram;  Surgeon: Elba Nevarez DO;  Location: [facility];  Service: Cardiology    CARDIAC CATHETERIZATION Left 6/17/2024    Procedure: Cardiac Left Heart Cath;  Surgeon: Elba Nevarez DO;  Location: [facility];  Service: Cardiology    CARDIAC CATHETERIZATION N/A 6/17/2024    Procedure: Cardiac Coronary Angiogram;  Surgeon: Elba Nevarez DO;  Location: [facility];  Service: Cardiology    HYSTERECTOMY      US GUIDED THYROID BIOPSY  10/4/2018   [3] No family history on file.  [4]   Social History  Tobacco Use    Smoking status: Never    Smokeless tobacco: Never   Vaping Use    Vaping status: Never Used   Substance Use Topics    Alcohol use: Never    Drug use: Never        Kennedy Baird MD  06/22/25 1418

## 2025-06-25 ENCOUNTER — OFFICE VISIT (OUTPATIENT)
Dept: ENDOCRINOLOGY | Facility: CLINIC | Age: 50
End: 2025-06-25
Payer: COMMERCIAL

## 2025-06-25 VITALS
RESPIRATION RATE: 18 BRPM | SYSTOLIC BLOOD PRESSURE: 142 MMHG | DIASTOLIC BLOOD PRESSURE: 74 MMHG | BODY MASS INDEX: 44.63 KG/M2 | HEART RATE: 78 BPM | TEMPERATURE: 98 F | OXYGEN SATURATION: 98 % | HEIGHT: 64 IN | WEIGHT: 261.4 LBS

## 2025-06-25 DIAGNOSIS — E78.2 MIXED HYPERLIPIDEMIA: ICD-10-CM

## 2025-06-25 DIAGNOSIS — I10 ESSENTIAL HYPERTENSION: ICD-10-CM

## 2025-06-25 DIAGNOSIS — E11.69 TYPE 2 DIABETES MELLITUS WITH OTHER SPECIFIED COMPLICATION, WITH LONG-TERM CURRENT USE OF INSULIN (HCC): ICD-10-CM

## 2025-06-25 DIAGNOSIS — E10.59 TYPE 1 DIABETES MELLITUS WITH OTHER CIRCULATORY COMPLICATION (HCC): Primary | ICD-10-CM

## 2025-06-25 DIAGNOSIS — Z79.4 TYPE 2 DIABETES MELLITUS WITH OTHER SPECIFIED COMPLICATION, WITH LONG-TERM CURRENT USE OF INSULIN (HCC): ICD-10-CM

## 2025-06-25 LAB — SL AMB POCT HEMOGLOBIN AIC: 7.7 (ref ?–6.5)

## 2025-06-25 PROCEDURE — 99214 OFFICE O/P EST MOD 30 MIN: CPT | Performed by: STUDENT IN AN ORGANIZED HEALTH CARE EDUCATION/TRAINING PROGRAM

## 2025-06-25 PROCEDURE — 83036 HEMOGLOBIN GLYCOSYLATED A1C: CPT | Performed by: STUDENT IN AN ORGANIZED HEALTH CARE EDUCATION/TRAINING PROGRAM

## 2025-06-25 PROCEDURE — 95251 CONT GLUC MNTR ANALYSIS I&R: CPT | Performed by: STUDENT IN AN ORGANIZED HEALTH CARE EDUCATION/TRAINING PROGRAM

## 2025-06-25 RX ORDER — ATORVASTATIN CALCIUM 40 MG/1
40 TABLET, FILM COATED ORAL DAILY
Qty: 90 TABLET | Refills: 1 | Status: SHIPPED | OUTPATIENT
Start: 2025-06-25

## 2025-06-25 NOTE — ASSESSMENT & PLAN NOTE
Lipitor 40 mg daily resumed.    Orders:  •  atorvastatin (LIPITOR) 40 mg tablet; Take 1 tablet (40 mg total) by mouth daily

## 2025-06-25 NOTE — ASSESSMENT & PLAN NOTE
- Her blood pressure is slightly elevated today at 142/74.  - The target is to maintain it below 130/80.  - She is currently taking Aldactone 25 mg daily and lisinopril 5 mg daily.  - She is advised to monitor her blood pressure at home. If it remains above 130/80, an increase in her lisinopril dosage may be considered.

## 2025-06-25 NOTE — PROGRESS NOTES
Name: Naina Jamison      : 1975      MRN: [id number]  Encounter Provider: Adrienne Aguilera MD  Encounter Date: 2025   Encounter department: [facility] [place]    Chief Complaint   Patient presents with   • Follow-up   :  Assessment & Plan  Type 1 diabetes mellitus with other circulatory complication (HCC)    Lab Results   Component Value Date    HGBA1C 7.7 (A) 2025     - Her A1c level has improved from 8.3 to 7.7%, indicating better glycemic control.  - The goal is to maintain A1c below 6.5%, keep her time in range above 70 percent.   - She also has components of insulin resistant, consistent with type 2 diabetes, with high daily insulin requirements approximately 180 units a day, therefore she will benefit from other form of medications to decrease insulin resistant, and I believe that she will greatly benefit from GLP-1 agonist, as she has history of CAD with stent.  -She previously tried Ozempic, although was discontinued as it was concern over adverse reactions, she is willing to try again and monitor her symptoms.  - She will be started on Ozempic 0.25 mg for 4 weeks, followed by an increase to 0.5 mg. She is advised to consume smaller, more frequent meals.  She will continue using the [employer] 780 pump with current settings for now. Blood work will be obtained prior to her next visit.  Ophthalmology follow-up.  Orders:  •  POCT hemoglobin A1c  •  Hemoglobin A1C; Future  •  Comprehensive metabolic panel; Future    Type 2 diabetes mellitus with other specified complication, with long-term current use of insulin (HCC)    Lab Results   Component Value Date    HGBA1C 7.7 (A) 2025     See plan for type 1 diabetes.  Orders:  •  semaglutide, 0.25 or 0.5 mg/dose, (Ozempic, 0.25 or 0.5 MG/DOSE,) 2 mg/3 mL injection pen; Inject 0.375 mL (0.25 mg total) under the skin every 7 days for 30 days, THEN 0.75 mL (0.5 mg total) every 7 days.    Mixed  hyperlipidemia  Lipitor 40 mg daily resumed.    Orders:  •  atorvastatin (LIPITOR) 40 mg tablet; Take 1 tablet (40 mg total) by mouth daily    Essential hypertension  - Her blood pressure is slightly elevated today at 142/74.  - The target is to maintain it below 130/80.  - She is currently taking Aldactone 25 mg daily and lisinopril 5 mg daily.  - She is advised to monitor her blood pressure at home. If it remains above 130/80, an increase in her lisinopril dosage may be considered.             Pertinent Medical History           History of Present Illness   History of Present Illness    Naina Jamison is a 50 y.o. female with type 1 diabetes seen in follow up. Reports complications of CAD s/p stent. Denies recent severe hypoglycemic or severe hyperglycemic episodes. Denies any issues with her current regimen. Last A1C was 7.7.       She is currently utilizing the [employer] 780 insulin pump for diabetes management. An incident occurred where the pump detached during the night, leading to elevated blood glucose levels. She was able to reattach it promptly, preventing a significant increase. She reports that her blood glucose levels tend to rise if she is unable to reattach the sensor within a few hours, requiring manual intervention to bring them down. She is currently using Humalog insulin and does not require any refills at this time.    She has been unable to obtain Wegovy due to insurance issues. A previous trial of Ozempic was discontinued after 7 weeks due to adverse reactions, including vomiting. She is willing to retry Ozempic at a lower dose.    She is on a daily regimen of Aldactone 25 mg and lisinopril 5 mg. She was previously on Lipitor but has since discontinued it.    She went to the ER on 06/22/2025 for severe neck stiffness and a persistent headache. They diagnosed it as a migraine, but it is not calming down.      CGM Interpretation:  Date Range: June12-25  Device used: G 4  Type: Type 1  "Diabetes  Home use     Analysis of data:   Average Glucose: 174 mg/dL  GMI: 7.5%  Coefficient of Variation: x%  SD: 71 mg/dL  Glucose Variability: x%  Time in Target Range: 64%   Time Above Range: 21% high, 15% very high  Time Below Range: 0% low, 0% very low    More than 72 hours of data was reviewed. Report to be scanned to chart.       Patient is on a Medtronic 7 80G pump prescribed by endocrinology.   She denies any malfunctioning of the pump.       Current Insulin pump settings:  Basal rate:  12 AM: 2.2 units/h  3 AM: 1.8 units/h  7 AM: 3.3 units/h  3 PM: 3.5 units/h  10 PM: 2.8 units/h  Insulin to carb ratio:  12 AM 5  6 AM 2.5  12 PM: 2.5  10 PM: 5  Insulin sensitivity factor:  12 AM: 16  6 AM: 10  11 PM: 16  BG target: 100 to 120 mg/dL  Type of insulin: Humalog  Active Insulin Time: 2 hours  Discussed with patient in case of malfunctioning of the pump to use basal and bolus therapy as backup which is prescribed to the patient. Also notified patient to call clinic if any issues.     Review of Systems as per HPI    Medical History Reviewed by provider this encounter:     .  Medications Ordered Prior to Encounter[1]      Medical History Reviewed by provider this encounter:     .    Objective   /74 (BP Location: Left arm, Patient Position: Sitting, Cuff Size: Standard)   Pulse 78   Temp 98 °F (36.7 °C) (Temporal)   Resp 18   Ht 5' 4\" (1.626 m)   Wt 119 kg (261 lb 6.4 oz)   SpO2 98%   BMI 44.87 kg/m²      Body mass index is 44.87 kg/m².  Wt Readings from Last 3 Encounters:   06/25/25 119 kg (261 lb 6.4 oz)   06/22/25 118 kg (260 lb 9.3 oz)   05/19/25 118 kg (259 lb 3.2 oz)     Physical Exam  Vital Signs: Blood pressure reading is 142/74.  Physical Exam  Constitutional:       General: She is not in acute distress.     Appearance: She is not ill-appearing.   HENT:      Head: Normocephalic and atraumatic.   Pulmonary:      Effort: Pulmonary effort is normal. No respiratory distress.     Neurological:    " "  Mental Status: She is oriented to person, place, and time.       Last Eye Exam: Not on file  Last Foot Exam: 11/10/2023  Health Maintenance   Topic Date Due   • Diabetic Eye Exam  Never done   • Diabetic Foot Exam  11/10/2024       Results  Labs:  A1c: 7.7%      Labs: I have reviewed pertinent labs including:   Lab Results   Component Value Date    HGBA1C 7.7 (A) 06/25/2025    HGBA1C 8.3 (A) 01/31/2025    HGBA1C 8.2 (H) 10/21/2024      Lab Results   Component Value Date    CREATININE 1.08 (H) 05/19/2025    CREATININE 1.57 (H) 05/13/2025    CREATININE 0.89 05/01/2025    BUN 19 05/19/2025    K 4.1 05/19/2025    CL 97 (L) 05/19/2025    CO2 28 05/19/2025      HDL, Direct   Date Value Ref Range Status   06/17/2024 51 >=50 mg/dL Final     Triglycerides   Date Value Ref Range Status   06/17/2024 149 See Comment mg/dL Final     Comment:     Triglyceride:     0-9Y            <75mg/dL     10Y-17Y         <90 mg/dL       >=18Y     Normal          <150 mg/dL     Borderline High 150-199 mg/dL     High            200-499 mg/dL        Very High       >499 mg/dL    Specimen collection should occur prior to Metamizole administration due to the potential for falsely depressed results.      Lab Results   Component Value Date    ZYB2NTLZZMWT 1.600 06/14/2024      No results found for: \"[id number]\"     There are no Patient Instructions on file for this visit.    Discussed with the patient and all questioned fully answered. She will call me if any problems arise.             [1]  Current Outpatient Medications on File Prior to Visit   Medication Sig Dispense Refill   • ALPRAZolam (XANAX) 1 mg tablet Take 1 mg by mouth daily at bedtime as needed for anxiety Pt unable to verify dose for RN     • aspirin (ECOTRIN LOW STRENGTH) 81 mg EC tablet Take 1 tablet (81 mg total) by mouth daily Do not start before July 7, 2023.  0   • cholecalciferol (VITAMIN D3) 1,000 units tablet Take 2,000 Units by mouth in the morning. Pt unable to verify " dose for RN.     • Continuous Glucose Sensor (Guardian 4 Glucose Sensor) MISC      • DULoxetine (CYMBALTA) 30 mg delayed release capsule Take 30 mg by mouth in the morning. Pt unable to verify dose for RN.     • HYDROcodone-acetaminophen (NORCO) 5-325 mg per tablet Take 1 tablet by mouth every 6 (six) hours as needed for pain Pt unable to verify dose for RN     • insulin lispro (HumaLOG) 100 units/mL injection For insulin pump use, daily requirement of 200 units daily 180 mL 1   • lidocaine (LMX) 4 % cream Apply topically as needed for moderate pain 30 g 0   • lisinopril (ZESTRIL) 5 mg tablet Take 1 tablet (5 mg total) by mouth daily 30 tablet 5   • methocarbamol (ROBAXIN) 500 mg tablet Take 1 tablet (500 mg total) by mouth 2 (two) times a day 20 tablet 0   • metoclopramide (REGLAN) 10 mg tablet Take 1 tablet (10 mg total) by mouth every 6 (six) hours as needed (headache) 8 tablet 0   • metoprolol succinate (TOPROL-XL) 25 mg 24 hr tablet Take 1 tablet (25 mg total) by mouth daily 30 tablet 3   • naproxen (NAPROSYN) 250 mg tablet Take 1 tablet (250 mg total) by mouth 3 (three) times a day with meals for 7 days 21 tablet 0   • nitroglycerin (NITROSTAT) 0.4 mg SL tablet Place 1 tablet (0.4 mg total) under the tongue every 5 (five) minutes as needed for chest pain 30 tablet 2   • ondansetron (ZOFRAN) 4 mg tablet Take 1 tablet (4 mg total) by mouth every 8 (eight) hours as needed for nausea or vomiting 20 tablet 0   • PATIENT MAINTAINED INSULIN PUMP Inject 1 each under the skin every 8 (eight) hours     • potassium chloride (Klor-Con M20) 20 mEq tablet Take 1 tablet (20 mEq total) by mouth daily 30 tablet 2   • rizatriptan (MAXALT) 10 mg tablet Take 10 mg by mouth as needed     • spironolactone (ALDACTONE) 25 mg tablet Take 1 tablet (25 mg total) by mouth daily 30 tablet 11   • torsemide (DEMADEX) 20 mg tablet Take 5 tablets (100 mg total) by mouth 2 (two) times a day 300 tablet 3   • [DISCONTINUED] Semaglutide-Weight  Management (WEGOVY) 0.5 MG/0.5ML Inject 0.5 mL (0.5 mg total) under the skin once a week 2 mL 3   • Continuous Blood Gluc Transmit (Guardian 4 Transmitter) MISC To check blood sugars continuously 1 each 0   • [DISCONTINUED] atorvastatin (LIPITOR) 40 mg tablet  (Patient not taking: Reported on 5/19/2025)       No current facility-administered medications on file prior to visit.

## 2025-06-25 NOTE — ASSESSMENT & PLAN NOTE
Lab Results   Component Value Date    HGBA1C 7.7 (A) 06/25/2025     - Her A1c level has improved from 8.3 to 7.7%, indicating better glycemic control.  - The goal is to maintain A1c below 6.5%, keep her time in range above 70 percent.   - She also has components of insulin resistant, consistent with type 2 diabetes, with high daily insulin requirements approximately 180 units a day, therefore she will benefit from other form of medications to decrease insulin resistant, and I believe that she will greatly benefit from GLP-1 agonist, as she has history of CAD with stent.  -She previously tried Ozempic, although was discontinued as it was concern over adverse reactions, she is willing to try again and monitor her symptoms.  - She will be started on Ozempic 0.25 mg for 4 weeks, followed by an increase to 0.5 mg. She is advised to consume smaller, more frequent meals.  She will continue using the Medtronic 780 pump with current settings for now. Blood work will be obtained prior to her next visit.  Ophthalmology follow-up.  Orders:  •  POCT hemoglobin A1c  •  Hemoglobin A1C; Future  •  Comprehensive metabolic panel; Future

## 2025-06-26 ENCOUNTER — APPOINTMENT (EMERGENCY)
Dept: CT IMAGING | Facility: HOSPITAL | Age: 50
End: 2025-06-26
Payer: COMMERCIAL

## 2025-06-26 ENCOUNTER — HOSPITAL ENCOUNTER (EMERGENCY)
Facility: HOSPITAL | Age: 50
Discharge: HOME | End: 2025-06-26
Attending: EMERGENCY MEDICINE | Admitting: INTERNAL MEDICINE
Payer: COMMERCIAL

## 2025-06-26 VITALS
DIASTOLIC BLOOD PRESSURE: 67 MMHG | SYSTOLIC BLOOD PRESSURE: 138 MMHG | TEMPERATURE: 97.9 F | OXYGEN SATURATION: 97 % | WEIGHT: 262.79 LBS | BODY MASS INDEX: 45.11 KG/M2 | RESPIRATION RATE: 16 BRPM | HEART RATE: 69 BPM

## 2025-06-26 DIAGNOSIS — R51.9 HEADACHE: ICD-10-CM

## 2025-06-26 DIAGNOSIS — M62.838 TRAPEZIUS MUSCLE SPASM: Primary | ICD-10-CM

## 2025-06-26 DIAGNOSIS — M62.838 CERVICAL PARASPINAL MUSCLE SPASM: ICD-10-CM

## 2025-06-26 DIAGNOSIS — M54.2 NECK PAIN: ICD-10-CM

## 2025-06-26 DIAGNOSIS — S16.1XXA STRAIN OF NECK MUSCLE, INITIAL ENCOUNTER: ICD-10-CM

## 2025-06-26 LAB
ALBUMIN SERPL BCG-MCNC: 4 G/DL (ref 3.5–5)
ALP SERPL-CCNC: 76 U/L (ref 34–104)
ALT SERPL W P-5'-P-CCNC: 29 U/L (ref 7–52)
ANION GAP SERPL CALCULATED.3IONS-SCNC: 5 MMOL/L (ref 4–13)
AST SERPL W P-5'-P-CCNC: 33 U/L (ref 13–39)
BASOPHILS # BLD AUTO: 0.05 THOUSANDS/ÂΜL (ref 0–0.1)
BASOPHILS NFR BLD AUTO: 1 % (ref 0–1)
BILIRUB SERPL-MCNC: 0.47 MG/DL (ref 0.2–1)
BNP SERPL-MCNC: 51 PG/ML (ref 0–100)
BUN SERPL-MCNC: 14 MG/DL (ref 5–25)
CALCIUM SERPL-MCNC: 8.8 MG/DL (ref 8.4–10.2)
CARDIAC TROPONIN I PNL SERPL HS: 4 NG/L (ref ?–50)
CHLORIDE SERPL-SCNC: 105 MMOL/L (ref 96–108)
CO2 SERPL-SCNC: 27 MMOL/L (ref 21–32)
CREAT SERPL-MCNC: 0.93 MG/DL (ref 0.6–1.3)
EOSINOPHIL # BLD AUTO: 0.43 THOUSAND/ÂΜL (ref 0–0.61)
EOSINOPHIL NFR BLD AUTO: 5 % (ref 0–6)
ERYTHROCYTE [DISTWIDTH] IN BLOOD BY AUTOMATED COUNT: 12.1 % (ref 11.6–15.1)
GFR SERPL CREATININE-BSD FRML MDRD: 72 ML/MIN/1.73SQ M
GLUCOSE SERPL-MCNC: 99 MG/DL (ref 65–140)
HCT VFR BLD AUTO: 40 % (ref 34.8–46.1)
HGB BLD-MCNC: 13.2 G/DL (ref 11.5–15.4)
IMM GRANULOCYTES # BLD AUTO: 0.05 THOUSAND/UL (ref 0–0.2)
IMM GRANULOCYTES NFR BLD AUTO: 1 % (ref 0–2)
LYMPHOCYTES # BLD AUTO: 1.9 THOUSANDS/ÂΜL (ref 0.6–4.47)
LYMPHOCYTES NFR BLD AUTO: 20 % (ref 14–44)
MCH RBC QN AUTO: 29.5 PG (ref 26.8–34.3)
MCHC RBC AUTO-ENTMCNC: 33 G/DL (ref 31.4–37.4)
MCV RBC AUTO: 89 FL (ref 82–98)
MONOCYTES # BLD AUTO: 0.84 THOUSAND/ÂΜL (ref 0.17–1.22)
MONOCYTES NFR BLD AUTO: 9 % (ref 4–12)
NEUTROPHILS # BLD AUTO: 6.38 THOUSANDS/ÂΜL (ref 1.85–7.62)
NEUTS SEG NFR BLD AUTO: 64 % (ref 43–75)
NRBC BLD AUTO-RTO: 0 /100 WBCS
PLATELET # BLD AUTO: 254 THOUSANDS/UL (ref 149–390)
PMV BLD AUTO: 10.3 FL (ref 8.9–12.7)
POTASSIUM SERPL-SCNC: 5.4 MMOL/L (ref 3.5–5.3)
PROT SERPL-MCNC: 6.8 G/DL (ref 6.4–8.4)
RBC # BLD AUTO: 4.48 MILLION/UL (ref 3.81–5.12)
SODIUM SERPL-SCNC: 137 MMOL/L (ref 135–147)
TSH SERPL DL<=0.05 MIU/L-ACNC: 3.27 UIU/ML (ref 0.45–4.5)
WBC # BLD AUTO: 9.65 THOUSAND/UL (ref 4.31–10.16)

## 2025-06-26 PROCEDURE — 70496 CT ANGIOGRAPHY HEAD: CPT

## 2025-06-26 PROCEDURE — 80053 COMPREHEN METABOLIC PANEL: CPT | Performed by: EMERGENCY MEDICINE

## 2025-06-26 PROCEDURE — 70498 CT ANGIOGRAPHY NECK: CPT

## 2025-06-26 PROCEDURE — 85025 COMPLETE CBC W/AUTO DIFF WBC: CPT | Performed by: EMERGENCY MEDICINE

## 2025-06-26 PROCEDURE — 36415 COLL VENOUS BLD VENIPUNCTURE: CPT | Performed by: EMERGENCY MEDICINE

## 2025-06-26 PROCEDURE — 84443 ASSAY THYROID STIM HORMONE: CPT | Performed by: EMERGENCY MEDICINE

## 2025-06-26 PROCEDURE — 99285 EMERGENCY DEPT VISIT HI MDM: CPT | Performed by: EMERGENCY MEDICINE

## 2025-06-26 PROCEDURE — 84484 ASSAY OF TROPONIN QUANT: CPT | Performed by: EMERGENCY MEDICINE

## 2025-06-26 PROCEDURE — 83880 ASSAY OF NATRIURETIC PEPTIDE: CPT | Performed by: EMERGENCY MEDICINE

## 2025-06-26 PROCEDURE — 93005 ELECTROCARDIOGRAM TRACING: CPT

## 2025-06-26 RX ORDER — LIDOCAINE 50 MG/G
1 PATCH TOPICAL ONCE
Status: DISCONTINUED | OUTPATIENT
Start: 2025-06-26 | End: 2025-06-26 | Stop reason: HOSPADM

## 2025-06-26 RX ORDER — DIPHENHYDRAMINE HYDROCHLORIDE 50 MG/ML
25 INJECTION, SOLUTION INTRAMUSCULAR; INTRAVENOUS ONCE
Status: COMPLETED | OUTPATIENT
Start: 2025-06-26 | End: 2025-06-26

## 2025-06-26 RX ORDER — METOCLOPRAMIDE HYDROCHLORIDE 5 MG/ML
10 INJECTION INTRAMUSCULAR; INTRAVENOUS ONCE
Status: COMPLETED | OUTPATIENT
Start: 2025-06-26 | End: 2025-06-26

## 2025-06-26 RX ORDER — SENNOSIDES 8.6 MG
650 CAPSULE ORAL EVERY 8 HOURS PRN
Qty: 30 TABLET | Refills: 0 | Status: SHIPPED | OUTPATIENT
Start: 2025-06-26

## 2025-06-26 RX ORDER — KETOROLAC TROMETHAMINE 30 MG/ML
30 INJECTION, SOLUTION INTRAMUSCULAR; INTRAVENOUS ONCE
Status: COMPLETED | OUTPATIENT
Start: 2025-06-26 | End: 2025-06-26

## 2025-06-26 RX ORDER — ACETAMINOPHEN 325 MG/1
975 TABLET ORAL ONCE
Status: COMPLETED | OUTPATIENT
Start: 2025-06-26 | End: 2025-06-26

## 2025-06-26 RX ORDER — DIAZEPAM 10 MG/2ML
5 INJECTION, SOLUTION INTRAMUSCULAR; INTRAVENOUS ONCE
Status: COMPLETED | OUTPATIENT
Start: 2025-06-26 | End: 2025-06-26

## 2025-06-26 RX ORDER — CYCLOBENZAPRINE HCL 10 MG
10 TABLET ORAL 2 TIMES DAILY PRN
Qty: 20 TABLET | Refills: 0 | Status: SHIPPED | OUTPATIENT
Start: 2025-06-26

## 2025-06-26 RX ORDER — MAGNESIUM SULFATE HEPTAHYDRATE 40 MG/ML
2 INJECTION, SOLUTION INTRAVENOUS ONCE
Status: COMPLETED | OUTPATIENT
Start: 2025-06-26 | End: 2025-06-26

## 2025-06-26 RX ADMIN — ACETAMINOPHEN 975 MG: 325 TABLET ORAL at 05:38

## 2025-06-26 RX ADMIN — DIPHENHYDRAMINE HYDROCHLORIDE 25 MG: 50 INJECTION, SOLUTION INTRAMUSCULAR; INTRAVENOUS at 06:29

## 2025-06-26 RX ADMIN — MAGNESIUM SULFATE HEPTAHYDRATE 2 G: 40 INJECTION, SOLUTION INTRAVENOUS at 06:33

## 2025-06-26 RX ADMIN — METOCLOPRAMIDE 10 MG: 5 INJECTION, SOLUTION INTRAMUSCULAR; INTRAVENOUS at 06:28

## 2025-06-26 RX ADMIN — LIDOCAINE 5% 1 PATCH: 700 PATCH TOPICAL at 05:43

## 2025-06-26 RX ADMIN — DIAZEPAM 5 MG: 10 INJECTION, SOLUTION INTRAMUSCULAR; INTRAVENOUS at 06:32

## 2025-06-26 RX ADMIN — KETOROLAC TROMETHAMINE 30 MG: 30 INJECTION, SOLUTION INTRAMUSCULAR; INTRAVENOUS at 06:28

## 2025-06-26 RX ADMIN — IOHEXOL 85 ML: 350 INJECTION, SOLUTION INTRAVENOUS at 07:36

## 2025-06-26 RX ADMIN — SODIUM CHLORIDE, SODIUM LACTATE, POTASSIUM CHLORIDE, AND CALCIUM CHLORIDE 1000 ML: .6; .31; .03; .02 INJECTION, SOLUTION INTRAVENOUS at 06:32

## 2025-06-26 NOTE — ED PROVIDER NOTES
Time reflects when diagnosis was documented in both MDM as applicable and the Disposition within this note       Time User Action Codes Description Comment    6/26/2025  6:12 AM SmeriglioRafKris Add [M62.838] Trapezius muscle spasm     6/26/2025  6:12 AM Smeriglio, Kris Add [M62.838] Cervical paraspinal muscle spasm     6/26/2025  6:12 AM Smeriglio Kris Add [S16.1XXA] Strain of neck muscle, initial encounter     6/26/2025  6:12 AM Smeriglio Kris Add [M54.2] Neck pain     6/26/2025  6:12 AM Smeriglio Kris Add [R51.9] Headache           ED Disposition       None          Assessment & Plan       Medical Decision Making  Differential diagnosis to include but not limited to tension headache, muscle spasm, cervical and upper thoracic strain, cervical radiculopathy, herniated disc, vertebral or carotid artery dissection, stroke, ACS or MI.    - Given patient's concerns, will do a cardiac workup.   - Will do an EKG for strain; arrhythmia, heart block, ST changes to rule out STEMI, pericarditis.  - Troponin for same as per protocol for evaluation of ACS.   - CBC to evaluate for anemia, evaluate for leukocytosis as sign of infectious source of symptoms.  - CMP to evaluate for electrolyte derangement, assess renal and hepatic function.  - BNP to evaluate for CHF and fluid overload.  - TSH with reflex as patient is concern for potential thyroid nodules.  I have a low suspicion for this but will obtain a level  - Given Her history and concerns I will obtain a CTA of her head and neck to evaluate for potential emergent condition such as dissection, stroke or intracranial hemorrhage.  - Will treat supportively for musculoskeletal pain with a Lidoderm patch, Valium, magnesium, Reglan, Benadryl, Tylenol and IV fluids  - Disposition per workup and response to treatment.    Workup is still pending.  At this time I will sign the patient out to the oncoming physician to follow-up on labs, imaging, reevaluation for  "response to treatment and disposition.    Heart Score:            Amount and/or Complexity of Data Reviewed  Labs: ordered.  Radiology: ordered.    Risk  OTC drugs.  Prescription drug management.             Medications   lactated ringers bolus 1,000 mL (has no administration in time range)   ketorolac (TORADOL) injection 30 mg (has no administration in time range)   metoclopramide (REGLAN) injection 10 mg (has no administration in time range)   diphenhydrAMINE (BENADRYL) injection 25 mg (has no administration in time range)   magnesium sulfate 2 g/50 mL IVPB (premix) 2 g (has no administration in time range)   diazepam (VALIUM) injection 5 mg (has no administration in time range)   lidocaine (LIDODERM) 5 % patch 1 patch (1 patch Topical Medication Applied 6/26/25 0543)   acetaminophen (TYLENOL) tablet 975 mg (975 mg Oral Given 6/26/25 0538)       ED Risk Strat Scores                    No data recorded        SBIRT 20yo+      Flowsheet Row Most Recent Value   Initial Alcohol Screen: US AUDIT-C     1. How often do you have a drink containing alcohol? 0 Filed at: 06/26/2025 0514   2. How many drinks containing alcohol do you have on a typical day you are drinking?  0 Filed at: 06/26/2025 0514   3b. FEMALE Any Age, or MALE 65+: How often do you have 4 or more drinks on one occassion? 0 Filed at: 06/26/2025 0514   Audit-C Score 0 Filed at: 06/26/2025 0514   OTIS: How many times in the past year have you...    Used an illegal drug or used a prescription medication for non-medical reasons? Never Filed at: 06/26/2025 0514                            History of Present Illness       Chief Complaint   Patient presents with    Neck Stiffness     Pt reports \"neck stiffness, swelling in neck, headache, right arm weakness, jaw pain, unable to sleep due to pain, pain behind both ears but pain behind the right ear is worse\"       Past Medical History[1]   Past Surgical History[2]   Family History[3]   Social History[4] "   E-Cigarette/Vaping    E-Cigarette Use Never User       E-Cigarette/Vaping Substances    Nicotine No     THC No     CBD No     Flavoring No     Other No     Unknown No       I have reviewed and agree with the history as documented.     The patient is a 50-year-old female who has a past medical history of Bicuspid aortic valve, Diabetes mellitus (HCC), Diastolic CHF (HCC), Graves disease, High cholesterol, and Hypertension.  She is presenting tonight for persistent and worsening neck pain, right arm pain and weakness, right-sided neck swelling, and headache.  States that it started about 1 week ago.  She feels that may be she was sitting at the computer too long doing work and had her head down.  Started noticing some discomfort that began in her neck that radiated to her right shoulder, right sided neck, ears and head.  Was evaluated here few days ago and received a migraine cocktail and muscle relaxer with some relief.  States that she has been taking the medications prescribed but is now getting more uncomfortable, feeling more weakness in her right arm and is unable to sleep.        History provided by:  Patient   used: No        Review of Systems   Constitutional:  Negative for chills and fever.   HENT:  Negative for congestion, facial swelling, rhinorrhea and sore throat.    Eyes:  Positive for photophobia. Negative for discharge and visual disturbance.   Respiratory:  Negative for cough and shortness of breath.    Cardiovascular:  Negative for chest pain.   Gastrointestinal:  Negative for nausea and vomiting.   Musculoskeletal:  Positive for arthralgias, myalgias, neck pain and neck stiffness.   Skin:  Negative for color change and rash.   Allergic/Immunologic: Negative for immunocompromised state.   Neurological:  Positive for weakness and headaches. Negative for tremors, syncope and speech difficulty.   All other systems reviewed and are negative.          Objective       ED Triage  Vitals [06/26/25 0435]   Temperature Pulse Blood Pressure Respirations SpO2 Patient Position - Orthostatic VS   97.9 °F (36.6 °C) 76 161/76 18 98 % Sitting      Temp Source Heart Rate Source BP Location FiO2 (%) Pain Score    Oral Monitor Right arm -- 10 - Worst Possible Pain      Vitals      Date and Time Temp Pulse SpO2 Resp BP Pain Score FACES Pain Rating User   06/26/25 0538 -- -- -- -- -- 10 - Worst Possible Pain -- AS   06/26/25 0435 97.9 °F (36.6 °C) 76 98 % 18 161/76 10 - Worst Possible Pain -- NM            Physical Exam  Vitals and nursing note reviewed.   Constitutional:       General: She is not in acute distress.     Appearance: She is well-developed. She is not ill-appearing, toxic-appearing or diaphoretic.   HENT:      Head: Normocephalic and atraumatic.      Right Ear: External ear normal.      Left Ear: External ear normal.      Nose: Nose normal. No congestion or rhinorrhea.     Eyes:      General: No scleral icterus.        Right eye: No discharge.         Left eye: No discharge.      Conjunctiva/sclera: Conjunctivae normal.      Pupils: Pupils are equal, round, and reactive to light.     Neck:      Thyroid: No thyroid mass or thyroid tenderness.      Vascular: Normal carotid pulses. No carotid bruit.      Trachea: Trachea and phonation normal. No tracheal tenderness, abnormal tracheal secretions or tracheal deviation.     Cardiovascular:      Rate and Rhythm: Normal rate and regular rhythm.      Heart sounds: Normal heart sounds. No murmur heard.     No friction rub.   Pulmonary:      Effort: Pulmonary effort is normal. No tachypnea, accessory muscle usage or respiratory distress.      Breath sounds: Normal breath sounds. No stridor. No wheezing or rales.   Abdominal:      General: There is no distension.      Palpations: Abdomen is soft.      Tenderness: There is no abdominal tenderness. There is no guarding or rebound.     Musculoskeletal:         General: No deformity.      Cervical back: Neck  supple. Edema, spasms and tenderness present. No signs of trauma, rigidity, torticollis or crepitus. Pain with movement and muscular tenderness present. No spinous process tenderness. Decreased range of motion.      Comments: Fullness along the right sternocleidomastoid and trapezius that does appear consistent with muscle spasm.  Decreased range of motion of the cervical spine.  Preserved range of motion of the right shoulder, elbow and wrist.   Lymphadenopathy:      Cervical: No cervical adenopathy.     Skin:     General: Skin is warm and dry.     Neurological:      General: No focal deficit present.      Mental Status: She is alert and oriented to person, place, and time. She is not disoriented.      Cranial Nerves: Cranial nerves 2-12 are intact. No cranial nerve deficit, dysarthria or facial asymmetry.      Sensory: Sensation is intact. No sensory deficit.      Motor: Weakness present. No tremor, abnormal muscle tone or pronator drift.      Gait: Gait normal.      Comments: Patient reports weakness in her right arm on exam.   strength is normal.  Does have weakness with empty can and full can testing but no pain to suggest a rotator cuff tear.  She states it feels heavy but sensation is intact.   Psychiatric:         Speech: Speech normal.         Behavior: Behavior normal.         Results Reviewed       Procedure Component Value Units Date/Time    CBC and differential [[id number]] Collected: 06/26/25 0556    Lab Status: Final result Specimen: Blood from Arm, Left Updated: 06/26/25 0610     WBC 9.65 Thousand/uL      RBC 4.48 Million/uL      Hemoglobin 13.2 g/dL      Hematocrit 40.0 %      MCV 89 fL      MCH 29.5 pg      MCHC 33.0 g/dL      RDW 12.1 %      MPV 10.3 fL      Platelets 254 Thousands/uL      nRBC 0 /100 WBCs      Segmented % 64 %      Immature Grans % 1 %      Lymphocytes % 20 %      Monocytes % 9 %      Eosinophils Relative 5 %      Basophils Relative 1 %      Absolute Neutrophils 6.38  Thousands/µL      Absolute Immature Grans 0.05 Thousand/uL      Absolute Lymphocytes 1.90 Thousands/µL      Absolute Monocytes 0.84 Thousand/µL      Eosinophils Absolute 0.43 Thousand/µL      Basophils Absolute 0.05 Thousands/µL     Comprehensive metabolic panel [[id number]] Collected: 06/26/25 0556    Lab Status: In process Specimen: Blood from Arm, Left Updated: 06/26/25 0600    TSH, 3rd generation with Free T4 reflex [[id number]] Collected: 06/26/25 0556    Lab Status: In process Specimen: Blood from Arm, Left Updated: 06/26/25 0600    HS Troponin 0hr (reflex protocol) [[id number]] Collected: 06/26/25 0556    Lab Status: In process Specimen: Blood from Arm, Left Updated: 06/26/25 0600    B-Type Natriuretic Peptide(BNP) [[id number]] Collected: 06/26/25 0556    Lab Status: In process Specimen: Blood from Arm, Left Updated: 06/26/25 0600            CTA head and neck with and without contrast    (Results Pending)       ECG 12 Lead Documentation Only    Date/Time: 6/26/2025 5:52 AM    Performed by: Kris Covington Jr., DO  Authorized by: Kris Covington Jr., DO    Indications / Diagnosis:  Neck pain  ECG reviewed by me, the ED Provider: yes    Patient location:  ED  Previous ECG:     Previous ECG:  Compared to current    Similarity:  No change  Interpretation:     Interpretation: normal    Rate:     ECG rate:  77    ECG rate assessment: normal    Rhythm:     Rhythm: sinus rhythm    Ectopy:     Ectopy: none    QRS:     QRS axis:  Normal    QRS intervals:  Normal  Conduction:     Conduction: normal    ST segments:     ST segments:  Normal  T waves:     T waves: normal        ED Medication and Procedure Management   Prior to Admission Medications   Prescriptions Last Dose Informant Patient Reported? Taking?   ALPRAZolam (XANAX) 1 mg tablet  Self Yes No   Sig: Take 1 mg by mouth daily at bedtime as needed for anxiety Pt unable to verify dose for RN   Continuous Blood Gluc Transmit (Guardian 4 Transmitter) MISC  Self No No    Sig: To check blood sugars continuously   Continuous Glucose Sensor (Guardian 4 Glucose Sensor) MISC  Self Yes No   DULoxetine (CYMBALTA) 30 mg delayed release capsule  Self Yes No   Sig: Take 30 mg by mouth in the morning. Pt unable to verify dose for RN.   HYDROcodone-acetaminophen (NORCO) 5-325 mg per tablet  Self Yes No   Sig: Take 1 tablet by mouth every 6 (six) hours as needed for pain Pt unable to verify dose for RN   PATIENT MAINTAINED INSULIN PUMP  Self Yes No   Sig: Inject 1 each under the skin every 8 (eight) hours   aspirin (ECOTRIN LOW STRENGTH) 81 mg EC tablet  Self No No   Sig: Take 1 tablet (81 mg total) by mouth daily Do not start before July 7, 2023.   atorvastatin (LIPITOR) 40 mg tablet   No No   Sig: Take 1 tablet (40 mg total) by mouth daily   cholecalciferol (VITAMIN D3) 1,000 units tablet  Self Yes No   Sig: Take 2,000 Units by mouth in the morning. Pt unable to verify dose for RN.   insulin lispro (HumaLOG) 100 units/mL injection  Self No No   Sig: For insulin pump use, daily requirement of 200 units daily   lidocaine (LMX) 4 % cream   No No   Sig: Apply topically as needed for moderate pain   lisinopril (ZESTRIL) 5 mg tablet   No No   Sig: Take 1 tablet (5 mg total) by mouth daily   methocarbamol (ROBAXIN) 500 mg tablet   No No   Sig: Take 1 tablet (500 mg total) by mouth 2 (two) times a day   metoclopramide (REGLAN) 10 mg tablet   No No   Sig: Take 1 tablet (10 mg total) by mouth every 6 (six) hours as needed (headache)   metoprolol succinate (TOPROL-XL) 25 mg 24 hr tablet   No No   Sig: Take 1 tablet (25 mg total) by mouth daily   naproxen (NAPROSYN) 250 mg tablet   No No   Sig: Take 1 tablet (250 mg total) by mouth 3 (three) times a day with meals for 7 days   nitroglycerin (NITROSTAT) 0.4 mg SL tablet  Self No No   Sig: Place 1 tablet (0.4 mg total) under the tongue every 5 (five) minutes as needed for chest pain   ondansetron (ZOFRAN) 4 mg tablet  Self No No   Sig: Take 1 tablet (4 mg  total) by mouth every 8 (eight) hours as needed for nausea or vomiting   potassium chloride (Klor-Con M20) 20 mEq tablet  Self No No   Sig: Take 1 tablet (20 mEq total) by mouth daily   rizatriptan (MAXALT) 10 mg tablet  Self Yes No   Sig: Take 10 mg by mouth as needed   semaglutide, 0.25 or 0.5 mg/dose, (Ozempic, 0.25 or 0.5 MG/DOSE,) 2 mg/3 mL injection pen   No No   Sig: Inject 0.375 mL (0.25 mg total) under the skin every 7 days for 30 days, THEN 0.75 mL (0.5 mg total) every 7 days.   spironolactone (ALDACTONE) 25 mg tablet  Self No No   Sig: Take 1 tablet (25 mg total) by mouth daily   torsemide (DEMADEX) 20 mg tablet  Self No No   Sig: Take 5 tablets (100 mg total) by mouth 2 (two) times a day      Facility-Administered Medications: None     Patient's Medications   Discharge Prescriptions    No medications on file     No discharge procedures on file.  ED SEPSIS DOCUMENTATION   Time reflects when diagnosis was documented in both MDM as applicable and the Disposition within this note       Time User Action Codes Description Comment    6/26/2025  6:12 AM Smeriglio Kris Add [M62.838] Trapezius muscle spasm     6/26/2025  6:12 AM Smeriglio Kris Add [M62.838] Cervical paraspinal muscle spasm     6/26/2025  6:12 AM Smeriglio Kris Add [S16.1XXA] Strain of neck muscle, initial encounter     6/26/2025  6:12 AM Smeriglio Kris Add [M54.2] Neck pain     6/26/2025  6:12 AM Smeriglio Kris Add [R51.9] Headache                      [1]   Past Medical History:  Diagnosis Date    Bicuspid aortic valve     Diabetes mellitus (HCC)     Diastolic CHF (HCC)     Graves disease     High cholesterol     Hypertension    [2]   Past Surgical History:  Procedure Laterality Date    ABDOMINAL SURGERY      Gallbladder removal    CARDIAC CATHETERIZATION Left 7/5/2023    Procedure: Cardiac Left Heart Cath;  Surgeon: Elba Nevarez DO;  Location: [facility];  Service: Cardiology    CARDIAC CATHETERIZATION N/A  7/5/2023    Procedure: Cardiac Coronary Angiogram;  Surgeon: Elba Nevarez DO;  Location: [facility];  Service: Cardiology    CARDIAC CATHETERIZATION Left 6/17/2024    Procedure: Cardiac Left Heart Cath;  Surgeon: Elba Nevarez DO;  Location: [facility];  Service: Cardiology    CARDIAC CATHETERIZATION N/A 6/17/2024    Procedure: Cardiac Coronary Angiogram;  Surgeon: Elba Nevarez DO;  Location: [facility];  Service: Cardiology    HYSTERECTOMY      US GUIDED THYROID BIOPSY  10/4/2018   [3] No family history on file.  [4]   Social History  Tobacco Use    Smoking status: Never    Smokeless tobacco: Never   Vaping Use    Vaping status: Never Used   Substance Use Topics    Alcohol use: Never    Drug use: Never        Kris Covington Jr., DO  06/26/25 0615

## 2025-06-26 NOTE — Clinical Note
Naina Shaheen was seen and treated in our emergency department on 6/26/2025.                Diagnosis:     Naina  .    She may return on this date:          If you have any questions or concerns, please don't hesitate to call.      Kris Covington Jr., DO    ______________________________           _______________          _______________  Hospital Representative                              Date                                Time

## 2025-06-26 NOTE — DISCHARGE INSTRUCTIONS
CTA head and neck with and without contrast  Result Date: 6/26/2025  Impression: CT Brain: No acute intracranial abnormality. CT Angiography: No intracranial large vessel occlusion or hemodynamically significant stenosis in the neck. Other: Bilateral proptosis. Workstation performed: [id number]

## 2025-06-26 NOTE — ED NOTES
Attempts at IV insertion failed by multiple RNs Seeking ultrasound placement at this time     Yadi Dave  06/26/25 0621

## 2025-06-27 ENCOUNTER — TELEPHONE (OUTPATIENT)
Dept: PHYSICAL THERAPY | Facility: OTHER | Age: 50
End: 2025-06-27

## 2025-06-27 LAB
ATRIAL RATE: 77 BPM
P AXIS: 53 DEGREES
PR INTERVAL: 130 MS
QRS AXIS: 67 DEGREES
QRSD INTERVAL: 60 MS
QT INTERVAL: 366 MS
QTC INTERVAL: 414 MS
T WAVE AXIS: 85 DEGREES
VENTRICULAR RATE: 77 BPM

## 2025-06-27 PROCEDURE — 93010 ELECTROCARDIOGRAM REPORT: CPT | Performed by: INTERNAL MEDICINE

## 2025-07-02 NOTE — TELEPHONE ENCOUNTER
Call placed to the patient per [facility] referral.    Spoke with the Pt. She is following up with chiro outside of network. She will keep [facility] phone number if she has to call back for PT in the future    closed

## 2025-07-30 ENCOUNTER — TELEPHONE (OUTPATIENT)
Age: 50
End: 2025-07-30

## (undated) DEVICE — GUIDEWIRE WHOLEY HI TORQUE INTERM MOD J .035 145CM

## (undated) DEVICE — Device: Brand: ASAHI SION BLUE

## (undated) DEVICE — RADIFOCUS GLIDEWIRE: Brand: GLIDEWIRE

## (undated) DEVICE — CORONARY IMAGING CATHETER: Brand: OPTICROSS™ HD

## (undated) DEVICE — CATH DIAG 5FR IMPULSE 110CM PIG

## (undated) DEVICE — Device

## (undated) DEVICE — RADIFOCUS OPTITORQUE ANGIOGRAPHIC CATHETER: Brand: OPTITORQUE

## (undated) DEVICE — GLIDESHEATH SLENDER STAINLESS STEEL KIT: Brand: GLIDESHEATH SLENDER

## (undated) DEVICE — TR BAND RADIAL ARTERY COMPRESSION DEVICE: Brand: TR BAND

## (undated) DEVICE — CATH F4 INF JR 4 100CM: Brand: INFINITI

## (undated) DEVICE — CORONARY IMAGING CATHETER: Brand: OPTICROSS™ 6 HD

## (undated) DEVICE — GLIDESHEATH BASIC HYDROPHILIC COATED INTRODUCER SHEATH: Brand: GLIDESHEATH

## (undated) DEVICE — DGW .035 FC J3MM 260CM TEF: Brand: EMERALD

## (undated) DEVICE — TREK CORONARY DILATATION CATHETER 2.50 MM X 15 MM / RAPID-EXCHANGE: Brand: TREK

## (undated) DEVICE — CATH GUIDE LAUNCHER 6FR EBU 3.5